# Patient Record
Sex: FEMALE | Race: BLACK OR AFRICAN AMERICAN | NOT HISPANIC OR LATINO | Employment: OTHER | URBAN - METROPOLITAN AREA
[De-identification: names, ages, dates, MRNs, and addresses within clinical notes are randomized per-mention and may not be internally consistent; named-entity substitution may affect disease eponyms.]

---

## 2024-07-21 ENCOUNTER — APPOINTMENT (OUTPATIENT)
Dept: RADIOLOGY | Facility: HOSPITAL | Age: 54
DRG: 208 | End: 2024-07-21
Payer: MEDICARE

## 2024-07-21 ENCOUNTER — HOSPITAL ENCOUNTER (INPATIENT)
Facility: HOSPITAL | Age: 54
DRG: 208 | End: 2024-07-21
Attending: EMERGENCY MEDICINE | Admitting: INTERNAL MEDICINE
Payer: MEDICARE

## 2024-07-21 ENCOUNTER — CLINICAL SUPPORT (OUTPATIENT)
Dept: EMERGENCY MEDICINE | Facility: HOSPITAL | Age: 54
DRG: 208 | End: 2024-07-21
Payer: MEDICARE

## 2024-07-21 DIAGNOSIS — I25.9 ISCHEMIC HEART DISEASE: ICD-10-CM

## 2024-07-21 DIAGNOSIS — J96.21 ACUTE ON CHRONIC RESPIRATORY FAILURE WITH HYPOXIA AND HYPERCAPNIA (MULTI): ICD-10-CM

## 2024-07-21 DIAGNOSIS — I49.01 CARDIAC ARREST WITH VENTRICULAR FIBRILLATION (MULTI): ICD-10-CM

## 2024-07-21 DIAGNOSIS — R41.82 ALTERED MENTAL STATUS, UNSPECIFIED ALTERED MENTAL STATUS TYPE: ICD-10-CM

## 2024-07-21 DIAGNOSIS — J96.22 ACUTE ON CHRONIC RESPIRATORY FAILURE WITH HYPERCAPNIA (MULTI): ICD-10-CM

## 2024-07-21 DIAGNOSIS — I82.4Y9 DEEP VEIN THROMBOSIS (DVT) OF PROXIMAL LOWER EXTREMITY, UNSPECIFIED CHRONICITY, UNSPECIFIED LATERALITY (MULTI): ICD-10-CM

## 2024-07-21 DIAGNOSIS — E11.65 TYPE 2 DIABETES MELLITUS WITH HYPERGLYCEMIA, UNSPECIFIED WHETHER LONG TERM INSULIN USE (MULTI): ICD-10-CM

## 2024-07-21 DIAGNOSIS — A41.9 SEPTIC SHOCK (MULTI): ICD-10-CM

## 2024-07-21 DIAGNOSIS — J96.22 ACUTE ON CHRONIC RESPIRATORY FAILURE WITH HYPOXIA AND HYPERCAPNIA (MULTI): ICD-10-CM

## 2024-07-21 DIAGNOSIS — E11.01: ICD-10-CM

## 2024-07-21 DIAGNOSIS — N17.9 AKI (ACUTE KIDNEY INJURY) (CMS-HCC): ICD-10-CM

## 2024-07-21 DIAGNOSIS — I46.9 CARDIAC ARREST WITH VENTRICULAR FIBRILLATION (MULTI): ICD-10-CM

## 2024-07-21 DIAGNOSIS — J18.9 PNEUMONIA OF LEFT LOWER LOBE DUE TO INFECTIOUS ORGANISM: ICD-10-CM

## 2024-07-21 DIAGNOSIS — R65.21 SEPTIC SHOCK (MULTI): ICD-10-CM

## 2024-07-21 DIAGNOSIS — R13.12 OROPHARYNGEAL DYSPHAGIA: ICD-10-CM

## 2024-07-21 DIAGNOSIS — R21 RASH AND NONSPECIFIC SKIN ERUPTION: Primary | ICD-10-CM

## 2024-07-21 DIAGNOSIS — R57.9 SHOCK, UNSPECIFIED (MULTI): ICD-10-CM

## 2024-07-21 DIAGNOSIS — K59.00 CONSTIPATION, UNSPECIFIED CONSTIPATION TYPE: ICD-10-CM

## 2024-07-21 LAB
ABO GROUP (TYPE) IN BLOOD: NORMAL
ALBUMIN SERPL BCP-MCNC: 3.5 G/DL (ref 3.4–5)
ALBUMIN SERPL BCP-MCNC: 3.5 G/DL (ref 3.4–5)
ALBUMIN SERPL BCP-MCNC: 3.9 G/DL (ref 3.4–5)
ALP SERPL-CCNC: 128 U/L (ref 33–110)
ALT SERPL W P-5'-P-CCNC: 9 U/L (ref 7–45)
AMPHETAMINES UR QL SCN: NORMAL
ANION GAP BLDV CALCULATED.4IONS-SCNC: 12 MMOL/L (ref 10–25)
ANION GAP BLDV CALCULATED.4IONS-SCNC: 13 MMOL/L (ref 10–25)
ANION GAP BLDV CALCULATED.4IONS-SCNC: 9 MMOL/L (ref 10–25)
ANION GAP SERPL CALC-SCNC: 17 MMOL/L (ref 10–20)
ANION GAP SERPL CALC-SCNC: 19 MMOL/L (ref 10–20)
ANION GAP SERPL CALC-SCNC: 21 MMOL/L (ref 10–20)
ANION GAP SERPL CALC-SCNC: 22 MMOL/L (ref 10–20)
ANTIBODY SCREEN: NORMAL
APPEARANCE UR: CLEAR
AST SERPL W P-5'-P-CCNC: 13 U/L (ref 9–39)
ATRIAL RATE: 95 BPM
B-OH-BUTYR SERPL-SCNC: 0.24 MMOL/L (ref 0.02–0.27)
BACTERIA #/AREA URNS AUTO: ABNORMAL /HPF
BARBITURATES UR QL SCN: NORMAL
BASE EXCESS BLDV CALC-SCNC: 10 MMOL/L (ref -2–3)
BASE EXCESS BLDV CALC-SCNC: 4.9 MMOL/L (ref -2–3)
BASE EXCESS BLDV CALC-SCNC: 8.7 MMOL/L (ref -2–3)
BASOPHILS # BLD AUTO: 0.03 X10*3/UL (ref 0–0.1)
BASOPHILS NFR BLD AUTO: 0.4 %
BENZODIAZ UR QL SCN: NORMAL
BILIRUB SERPL-MCNC: 0.7 MG/DL (ref 0–1.2)
BILIRUB UR STRIP.AUTO-MCNC: NEGATIVE MG/DL
BNP SERPL-MCNC: 6 PG/ML (ref 0–99)
BODY TEMPERATURE: 37 DEGREES CELSIUS
BUN SERPL-MCNC: 69 MG/DL (ref 6–23)
BUN SERPL-MCNC: 74 MG/DL (ref 6–23)
BUN SERPL-MCNC: 78 MG/DL (ref 6–23)
BUN SERPL-MCNC: 80 MG/DL (ref 6–23)
BZE UR QL SCN: NORMAL
CA-I BLDV-SCNC: 1.27 MMOL/L (ref 1.1–1.33)
CA-I BLDV-SCNC: 1.29 MMOL/L (ref 1.1–1.33)
CA-I BLDV-SCNC: 1.49 MMOL/L (ref 1.1–1.33)
CALCIUM SERPL-MCNC: 10.2 MG/DL (ref 8.6–10.6)
CALCIUM SERPL-MCNC: 10.7 MG/DL (ref 8.6–10.6)
CALCIUM SERPL-MCNC: 10.8 MG/DL (ref 8.6–10.6)
CALCIUM SERPL-MCNC: 10.8 MG/DL (ref 8.6–10.6)
CANNABINOIDS UR QL SCN: NORMAL
CARDIAC TROPONIN I PNL SERPL HS: 28 NG/L (ref 0–34)
CHLORIDE BLDV-SCNC: 105 MMOL/L (ref 98–107)
CHLORIDE BLDV-SCNC: 107 MMOL/L (ref 98–107)
CHLORIDE BLDV-SCNC: 110 MMOL/L (ref 98–107)
CHLORIDE SERPL-SCNC: 102 MMOL/L (ref 98–107)
CHLORIDE SERPL-SCNC: 103 MMOL/L (ref 98–107)
CHLORIDE SERPL-SCNC: 108 MMOL/L (ref 98–107)
CHLORIDE SERPL-SCNC: 112 MMOL/L (ref 98–107)
CHLORIDE UR-SCNC: 108 MMOL/L
CHLORIDE/CREATININE (MMOL/G) IN URINE: 452 MMOL/G CREAT (ref 38–318)
CO2 SERPL-SCNC: 29 MMOL/L (ref 21–32)
CO2 SERPL-SCNC: 29 MMOL/L (ref 21–32)
CO2 SERPL-SCNC: 35 MMOL/L (ref 21–32)
CO2 SERPL-SCNC: 35 MMOL/L (ref 21–32)
COLOR UR: ABNORMAL
CREAT SERPL-MCNC: 2.9 MG/DL (ref 0.5–1.05)
CREAT SERPL-MCNC: 3.24 MG/DL (ref 0.5–1.05)
CREAT SERPL-MCNC: 3.57 MG/DL (ref 0.5–1.05)
CREAT SERPL-MCNC: 3.57 MG/DL (ref 0.5–1.05)
CREAT UR-MCNC: 23.9 MG/DL (ref 20–320)
EGFRCR SERPLBLD CKD-EPI 2021: 14 ML/MIN/1.73M*2
EGFRCR SERPLBLD CKD-EPI 2021: 14 ML/MIN/1.73M*2
EGFRCR SERPLBLD CKD-EPI 2021: 16 ML/MIN/1.73M*2
EGFRCR SERPLBLD CKD-EPI 2021: 19 ML/MIN/1.73M*2
EOSINOPHIL # BLD AUTO: 0.05 X10*3/UL (ref 0–0.7)
EOSINOPHIL NFR BLD AUTO: 0.7 %
ERYTHROCYTE [DISTWIDTH] IN BLOOD BY AUTOMATED COUNT: 19.6 % (ref 11.5–14.5)
FENTANYL+NORFENTANYL UR QL SCN: NORMAL
GLUCOSE BLD MANUAL STRIP-MCNC: 241 MG/DL (ref 74–99)
GLUCOSE BLD MANUAL STRIP-MCNC: 357 MG/DL (ref 74–99)
GLUCOSE BLD MANUAL STRIP-MCNC: 366 MG/DL (ref 74–99)
GLUCOSE BLD MANUAL STRIP-MCNC: 485 MG/DL (ref 74–99)
GLUCOSE BLD MANUAL STRIP-MCNC: 524 MG/DL (ref 74–99)
GLUCOSE BLD MANUAL STRIP-MCNC: >600 MG/DL (ref 74–99)
GLUCOSE BLDV-MCNC: >685 MG/DL (ref 74–99)
GLUCOSE SERPL-MCNC: 1444 MG/DL (ref 74–99)
GLUCOSE SERPL-MCNC: 1498 MG/DL (ref 74–99)
GLUCOSE SERPL-MCNC: 362 MG/DL (ref 74–99)
GLUCOSE SERPL-MCNC: 732 MG/DL (ref 74–99)
GLUCOSE UR STRIP.AUTO-MCNC: ABNORMAL MG/DL
GRAN CASTS #/AREA UR COMP ASSIST: ABNORMAL /LPF
HCO3 BLDV-SCNC: 35.6 MMOL/L (ref 22–26)
HCO3 BLDV-SCNC: 37 MMOL/L (ref 22–26)
HCO3 BLDV-SCNC: 39.9 MMOL/L (ref 22–26)
HCT VFR BLD AUTO: 50.3 % (ref 36–46)
HCT VFR BLD EST: 38 % (ref 36–46)
HCT VFR BLD EST: 39 % (ref 36–46)
HCT VFR BLD EST: 42 % (ref 36–46)
HGB BLD-MCNC: 13.8 G/DL (ref 12–16)
HGB BLDV-MCNC: 12.5 G/DL (ref 12–16)
HGB BLDV-MCNC: 13 G/DL (ref 12–16)
HGB BLDV-MCNC: 13.9 G/DL (ref 12–16)
HYALINE CASTS #/AREA URNS AUTO: ABNORMAL /LPF
IMM GRANULOCYTES # BLD AUTO: 0.04 X10*3/UL (ref 0–0.7)
IMM GRANULOCYTES NFR BLD AUTO: 0.5 % (ref 0–0.9)
INHALED O2 CONCENTRATION: 21 %
INHALED O2 CONCENTRATION: 21 %
INR PPP: 1.7 (ref 0.9–1.1)
KETONES UR STRIP.AUTO-MCNC: NEGATIVE MG/DL
LACTATE BLDV-SCNC: 4.6 MMOL/L (ref 0.4–2)
LACTATE BLDV-SCNC: 5.2 MMOL/L (ref 0.4–2)
LACTATE SERPL-SCNC: 3.5 MMOL/L (ref 0.4–2)
LEUKOCYTE ESTERASE UR QL STRIP.AUTO: NEGATIVE
LYMPHOCYTES # BLD AUTO: 1.12 X10*3/UL (ref 1.2–4.8)
LYMPHOCYTES NFR BLD AUTO: 14.6 %
MAGNESIUM SERPL-MCNC: 2.21 MG/DL (ref 1.6–2.4)
MAGNESIUM SERPL-MCNC: 2.93 MG/DL (ref 1.6–2.4)
MCH RBC QN AUTO: 26.6 PG (ref 26–34)
MCHC RBC AUTO-ENTMCNC: 27.4 G/DL (ref 32–36)
MCV RBC AUTO: 97 FL (ref 80–100)
METHADONE UR QL SCN: NORMAL
MONOCYTES # BLD AUTO: 1.27 X10*3/UL (ref 0.1–1)
MONOCYTES NFR BLD AUTO: 16.6 %
MUCOUS THREADS #/AREA URNS AUTO: ABNORMAL /LPF
NEUTROPHILS # BLD AUTO: 5.14 X10*3/UL (ref 1.2–7.7)
NEUTROPHILS NFR BLD AUTO: 67.2 %
NITRITE UR QL STRIP.AUTO: NEGATIVE
NRBC BLD-RTO: 0 /100 WBCS (ref 0–0)
OPIATES UR QL SCN: NORMAL
OSMOLALITY SERPL: 416 MOSM/KG (ref 280–300)
OSMOLALITY UR: 455 MOSM/KG (ref 200–1200)
OXYCODONE+OXYMORPHONE UR QL SCN: NORMAL
OXYHGB MFR BLDV: 48.2 % (ref 45–75)
OXYHGB MFR BLDV: 54.6 % (ref 45–75)
OXYHGB MFR BLDV: 58.6 % (ref 45–75)
P AXIS: 55 DEGREES
P OFFSET: 194 MS
P ONSET: 139 MS
PCO2 BLDV: 67 MM HG (ref 41–51)
PCO2 BLDV: 83 MM HG (ref 41–51)
PCO2 BLDV: 89 MM HG (ref 41–51)
PCP UR QL SCN: NORMAL
PH BLDV: 7.21 PH (ref 7.33–7.43)
PH BLDV: 7.29 PH (ref 7.33–7.43)
PH BLDV: 7.35 PH (ref 7.33–7.43)
PH UR STRIP.AUTO: 5.5 [PH]
PHOSPHATE SERPL-MCNC: 1.2 MG/DL (ref 2.5–4.9)
PHOSPHATE SERPL-MCNC: 2.7 MG/DL (ref 2.5–4.9)
PLATELET # BLD AUTO: 165 X10*3/UL (ref 150–450)
PO2 BLDV: 35 MM HG (ref 35–45)
PO2 BLDV: 40 MM HG (ref 35–45)
PO2 BLDV: 40 MM HG (ref 35–45)
POTASSIUM BLDV-SCNC: 5.3 MMOL/L (ref 3.5–5.3)
POTASSIUM BLDV-SCNC: 5.6 MMOL/L (ref 3.5–5.3)
POTASSIUM BLDV-SCNC: 6.1 MMOL/L (ref 3.5–5.3)
POTASSIUM SERPL-SCNC: 3.4 MMOL/L (ref 3.5–5.3)
POTASSIUM SERPL-SCNC: 4.8 MMOL/L (ref 3.5–5.3)
POTASSIUM SERPL-SCNC: 5.6 MMOL/L (ref 3.5–5.3)
POTASSIUM SERPL-SCNC: 6.1 MMOL/L (ref 3.5–5.3)
POTASSIUM UR-SCNC: 37 MMOL/L
POTASSIUM/CREAT UR-RTO: 155 MMOL/G CREAT
PR INTERVAL: 156 MS
PROT SERPL-MCNC: 8 G/DL (ref 6.4–8.2)
PROT SERPL-MCNC: 8.7 G/DL (ref 6.4–8.2)
PROT UR STRIP.AUTO-MCNC: ABNORMAL MG/DL
PROT UR-ACNC: 31 MG/DL (ref 5–25)
PROTHROMBIN TIME: 19.2 SECONDS (ref 9.8–12.8)
Q ONSET: 217 MS
QRS COUNT: 16 BEATS
QRS DURATION: 96 MS
QT INTERVAL: 336 MS
QTC CALCULATION(BAZETT): 422 MS
QTC FREDERICIA: 391 MS
R AXIS: -10 DEGREES
RBC # BLD AUTO: 5.19 X10*6/UL (ref 4–5.2)
RBC # UR STRIP.AUTO: ABNORMAL /UL
RBC #/AREA URNS AUTO: ABNORMAL /HPF
RH FACTOR (ANTIGEN D): NORMAL
SAO2 % BLDV: 49 % (ref 45–75)
SAO2 % BLDV: 56 % (ref 45–75)
SAO2 % BLDV: 60 % (ref 45–75)
SARS-COV-2 RNA RESP QL NAA+PROBE: DETECTED
SODIUM BLDV-SCNC: 148 MMOL/L (ref 136–145)
SODIUM BLDV-SCNC: 150 MMOL/L (ref 136–145)
SODIUM BLDV-SCNC: 154 MMOL/L (ref 136–145)
SODIUM SERPL-SCNC: 149 MMOL/L (ref 136–145)
SODIUM SERPL-SCNC: 150 MMOL/L (ref 136–145)
SODIUM SERPL-SCNC: 154 MMOL/L (ref 136–145)
SODIUM SERPL-SCNC: 159 MMOL/L (ref 136–145)
SODIUM UR-SCNC: 91 MMOL/L
SODIUM/CREAT UR-RTO: 381 MMOL/G CREAT
SP GR UR STRIP.AUTO: 1.02
SQUAMOUS #/AREA URNS AUTO: ABNORMAL /HPF
T AXIS: 8 DEGREES
T OFFSET: 385 MS
TSH SERPL-ACNC: 3.43 MIU/L (ref 0.44–3.98)
UROBILINOGEN UR STRIP.AUTO-MCNC: NORMAL MG/DL
VENTRICULAR RATE: 95 BPM
WBC # BLD AUTO: 7.7 X10*3/UL (ref 4.4–11.3)
WBC #/AREA URNS AUTO: ABNORMAL /HPF

## 2024-07-21 PROCEDURE — 2500000005 HC RX 250 GENERAL PHARMACY W/O HCPCS: Mod: SE

## 2024-07-21 PROCEDURE — 96367 TX/PROPH/DG ADDL SEQ IV INF: CPT | Mod: 59

## 2024-07-21 PROCEDURE — 83036 HEMOGLOBIN GLYCOSYLATED A1C: CPT

## 2024-07-21 PROCEDURE — 86334 IMMUNOFIX E-PHORESIS SERUM: CPT

## 2024-07-21 PROCEDURE — 84166 PROTEIN E-PHORESIS/URINE/CSF: CPT

## 2024-07-21 PROCEDURE — 83880 ASSAY OF NATRIURETIC PEPTIDE: CPT | Performed by: NURSE PRACTITIONER

## 2024-07-21 PROCEDURE — 2500000004 HC RX 250 GENERAL PHARMACY W/ HCPCS (ALT 636 FOR OP/ED)

## 2024-07-21 PROCEDURE — 96366 THER/PROPH/DIAG IV INF ADDON: CPT | Mod: 59

## 2024-07-21 PROCEDURE — 31500 INSERT EMERGENCY AIRWAY: CPT | Performed by: NURSE PRACTITIONER

## 2024-07-21 PROCEDURE — 82010 KETONE BODYS QUAN: CPT

## 2024-07-21 PROCEDURE — 3E033XZ INTRODUCTION OF VASOPRESSOR INTO PERIPHERAL VEIN, PERCUTANEOUS APPROACH: ICD-10-PCS | Performed by: NURSE PRACTITIONER

## 2024-07-21 PROCEDURE — 99291 CRITICAL CARE FIRST HOUR: CPT

## 2024-07-21 PROCEDURE — 93010 ELECTROCARDIOGRAM REPORT: CPT | Performed by: NURSE PRACTITIONER

## 2024-07-21 PROCEDURE — 92950 HEART/LUNG RESUSCITATION CPR: CPT | Performed by: EMERGENCY MEDICINE

## 2024-07-21 PROCEDURE — 82947 ASSAY GLUCOSE BLOOD QUANT: CPT

## 2024-07-21 PROCEDURE — 84132 ASSAY OF SERUM POTASSIUM: CPT

## 2024-07-21 PROCEDURE — 2500000001 HC RX 250 WO HCPCS SELF ADMINISTERED DRUGS (ALT 637 FOR MEDICARE OP)

## 2024-07-21 PROCEDURE — 70450 CT HEAD/BRAIN W/O DYE: CPT

## 2024-07-21 PROCEDURE — 83605 ASSAY OF LACTIC ACID: CPT | Performed by: NURSE PRACTITIONER

## 2024-07-21 PROCEDURE — 84075 ASSAY ALKALINE PHOSPHATASE: CPT | Performed by: NURSE PRACTITIONER

## 2024-07-21 PROCEDURE — 99292 CRITICAL CARE ADDL 30 MIN: CPT | Performed by: NURSE PRACTITIONER

## 2024-07-21 PROCEDURE — 71045 X-RAY EXAM CHEST 1 VIEW: CPT | Performed by: RADIOLOGY

## 2024-07-21 PROCEDURE — 36415 COLL VENOUS BLD VENIPUNCTURE: CPT | Performed by: NURSE PRACTITIONER

## 2024-07-21 PROCEDURE — 84155 ASSAY OF PROTEIN SERUM: CPT

## 2024-07-21 PROCEDURE — 83935 ASSAY OF URINE OSMOLALITY: CPT

## 2024-07-21 PROCEDURE — 84156 ASSAY OF PROTEIN URINE: CPT

## 2024-07-21 PROCEDURE — 80307 DRUG TEST PRSMV CHEM ANLYZR: CPT

## 2024-07-21 PROCEDURE — 85025 COMPLETE CBC W/AUTO DIFF WBC: CPT | Performed by: NURSE PRACTITIONER

## 2024-07-21 PROCEDURE — 99285 EMERGENCY DEPT VISIT HI MDM: CPT | Mod: 25

## 2024-07-21 PROCEDURE — 5A1945Z RESPIRATORY VENTILATION, 24-96 CONSECUTIVE HOURS: ICD-10-PCS | Performed by: NURSE PRACTITIONER

## 2024-07-21 PROCEDURE — 84484 ASSAY OF TROPONIN QUANT: CPT | Performed by: NURSE PRACTITIONER

## 2024-07-21 PROCEDURE — 86325 OTHER IMMUNOELECTROPHORESIS: CPT

## 2024-07-21 PROCEDURE — 0BH17EZ INSERTION OF ENDOTRACHEAL AIRWAY INTO TRACHEA, VIA NATURAL OR ARTIFICIAL OPENING: ICD-10-PCS | Performed by: NURSE PRACTITIONER

## 2024-07-21 PROCEDURE — 85610 PROTHROMBIN TIME: CPT | Performed by: NURSE PRACTITIONER

## 2024-07-21 PROCEDURE — 83930 ASSAY OF BLOOD OSMOLALITY: CPT

## 2024-07-21 PROCEDURE — 93005 ELECTROCARDIOGRAM TRACING: CPT

## 2024-07-21 PROCEDURE — 2500000002 HC RX 250 W HCPCS SELF ADMINISTERED DRUGS (ALT 637 FOR MEDICARE OP, ALT 636 FOR OP/ED): Mod: SE | Performed by: NURSE PRACTITIONER

## 2024-07-21 PROCEDURE — 2500000005 HC RX 250 GENERAL PHARMACY W/O HCPCS

## 2024-07-21 PROCEDURE — 2020000001 HC ICU ROOM DAILY

## 2024-07-21 PROCEDURE — 71045 X-RAY EXAM CHEST 1 VIEW: CPT

## 2024-07-21 PROCEDURE — 71250 CT THORAX DX C-: CPT | Performed by: RADIOLOGY

## 2024-07-21 PROCEDURE — 86320 SERUM IMMUNOELECTROPHORESIS: CPT

## 2024-07-21 PROCEDURE — 84165 PROTEIN E-PHORESIS SERUM: CPT

## 2024-07-21 PROCEDURE — 82436 ASSAY OF URINE CHLORIDE: CPT

## 2024-07-21 PROCEDURE — 74176 CT ABD & PELVIS W/O CONTRAST: CPT

## 2024-07-21 PROCEDURE — 83735 ASSAY OF MAGNESIUM: CPT | Performed by: NURSE PRACTITIONER

## 2024-07-21 PROCEDURE — 94002 VENT MGMT INPAT INIT DAY: CPT | Mod: CCI

## 2024-07-21 PROCEDURE — 74018 RADEX ABDOMEN 1 VIEW: CPT

## 2024-07-21 PROCEDURE — 74176 CT ABD & PELVIS W/O CONTRAST: CPT | Performed by: RADIOLOGY

## 2024-07-21 PROCEDURE — 82565 ASSAY OF CREATININE: CPT | Performed by: NURSE PRACTITIONER

## 2024-07-21 PROCEDURE — 70450 CT HEAD/BRAIN W/O DYE: CPT | Performed by: RADIOLOGY

## 2024-07-21 PROCEDURE — 96365 THER/PROPH/DIAG IV INF INIT: CPT | Mod: 59

## 2024-07-21 PROCEDURE — 84443 ASSAY THYROID STIM HORMONE: CPT | Performed by: NURSE PRACTITIONER

## 2024-07-21 PROCEDURE — 86901 BLOOD TYPING SEROLOGIC RH(D): CPT

## 2024-07-21 PROCEDURE — 2500000004 HC RX 250 GENERAL PHARMACY W/ HCPCS (ALT 636 FOR OP/ED): Mod: SE | Performed by: NURSE PRACTITIONER

## 2024-07-21 PROCEDURE — 81001 URINALYSIS AUTO W/SCOPE: CPT | Performed by: NURSE PRACTITIONER

## 2024-07-21 PROCEDURE — 87040 BLOOD CULTURE FOR BACTERIA: CPT | Performed by: NURSE PRACTITIONER

## 2024-07-21 PROCEDURE — 84132 ASSAY OF SERUM POTASSIUM: CPT | Performed by: NURSE PRACTITIONER

## 2024-07-21 PROCEDURE — 2500000005 HC RX 250 GENERAL PHARMACY W/O HCPCS: Performed by: NURSE PRACTITIONER

## 2024-07-21 PROCEDURE — 99291 CRITICAL CARE FIRST HOUR: CPT | Performed by: NURSE PRACTITIONER

## 2024-07-21 PROCEDURE — 96375 TX/PRO/DX INJ NEW DRUG ADDON: CPT | Mod: 59

## 2024-07-21 PROCEDURE — 96361 HYDRATE IV INFUSION ADD-ON: CPT | Mod: 59

## 2024-07-21 PROCEDURE — 87635 SARS-COV-2 COVID-19 AMP PRB: CPT

## 2024-07-21 PROCEDURE — 83735 ASSAY OF MAGNESIUM: CPT

## 2024-07-21 PROCEDURE — 94640 AIRWAY INHALATION TREATMENT: CPT

## 2024-07-21 RX ORDER — IPRATROPIUM BROMIDE AND ALBUTEROL SULFATE 2.5; .5 MG/3ML; MG/3ML
3 SOLUTION RESPIRATORY (INHALATION) EVERY 20 MIN
Status: COMPLETED | OUTPATIENT
Start: 2024-07-21 | End: 2024-07-21

## 2024-07-21 RX ORDER — ROCURONIUM BROMIDE 10 MG/ML
100 INJECTION, SOLUTION INTRAVENOUS ONCE
Status: DISCONTINUED | OUTPATIENT
Start: 2024-07-21 | End: 2024-07-21

## 2024-07-21 RX ORDER — ETOMIDATE 2 MG/ML
INJECTION INTRAVENOUS
Status: COMPLETED
Start: 2024-07-21 | End: 2024-07-21

## 2024-07-21 RX ORDER — MEROPENEM 1 G/1
1 INJECTION, POWDER, FOR SOLUTION INTRAVENOUS EVERY 12 HOURS
Status: DISCONTINUED | OUTPATIENT
Start: 2024-07-21 | End: 2024-07-21

## 2024-07-21 RX ORDER — VANCOMYCIN HYDROCHLORIDE 1 G/20ML
INJECTION, POWDER, LYOPHILIZED, FOR SOLUTION INTRAVENOUS DAILY PRN
Status: DISCONTINUED | OUTPATIENT
Start: 2024-07-21 | End: 2024-07-24

## 2024-07-21 RX ORDER — DEXTROSE MONOHYDRATE AND SODIUM CHLORIDE 5; .9 G/100ML; G/100ML
150 INJECTION, SOLUTION INTRAVENOUS CONTINUOUS
Status: DISCONTINUED | OUTPATIENT
Start: 2024-07-21 | End: 2024-07-21

## 2024-07-21 RX ORDER — BISACODYL 10 MG/1
10 SUPPOSITORY RECTAL ONCE
Status: COMPLETED | OUTPATIENT
Start: 2024-07-21 | End: 2024-07-21

## 2024-07-21 RX ORDER — DEXTROSE MONOHYDRATE 100 MG/ML
150 INJECTION, SOLUTION INTRAVENOUS CONTINUOUS
Status: DISCONTINUED | OUTPATIENT
Start: 2024-07-21 | End: 2024-07-22

## 2024-07-21 RX ORDER — PROPOFOL 10 MG/ML
INJECTION, EMULSION INTRAVENOUS
Status: COMPLETED
Start: 2024-07-21 | End: 2024-07-21

## 2024-07-21 RX ORDER — PROPOFOL 10 MG/ML
5-50 INJECTION, EMULSION INTRAVENOUS CONTINUOUS
Status: DISCONTINUED | OUTPATIENT
Start: 2024-07-21 | End: 2024-07-23

## 2024-07-21 RX ORDER — VANCOMYCIN HYDROCHLORIDE 1 G/200ML
2 INJECTION, SOLUTION INTRAVENOUS ONCE
Status: COMPLETED | OUTPATIENT
Start: 2024-07-21 | End: 2024-07-21

## 2024-07-21 RX ORDER — METRONIDAZOLE 500 MG/100ML
500 INJECTION, SOLUTION INTRAVENOUS EVERY 8 HOURS
Status: DISCONTINUED | OUTPATIENT
Start: 2024-07-21 | End: 2024-07-24

## 2024-07-21 RX ORDER — HEPARIN SODIUM 10000 [USP'U]/100ML
0-4000 INJECTION, SOLUTION INTRAVENOUS CONTINUOUS
Status: DISCONTINUED | OUTPATIENT
Start: 2024-07-21 | End: 2024-07-22

## 2024-07-21 RX ORDER — DEXTROMETHORPHAN POLISTIREX 30 MG/5 ML
1 SUSPENSION, EXTENDED RELEASE 12 HR ORAL ONCE
Status: DISCONTINUED | OUTPATIENT
Start: 2024-07-21 | End: 2024-07-21

## 2024-07-21 RX ORDER — NOREPINEPHRINE BITARTRATE/D5W 8 MG/250ML
0-.2 PLASTIC BAG, INJECTION (ML) INTRAVENOUS CONTINUOUS
Status: DISCONTINUED | OUTPATIENT
Start: 2024-07-21 | End: 2024-07-21

## 2024-07-21 RX ORDER — CALCIUM GLUCONATE 20 MG/ML
2 INJECTION, SOLUTION INTRAVENOUS ONCE
Status: COMPLETED | OUTPATIENT
Start: 2024-07-21 | End: 2024-07-21

## 2024-07-21 RX ORDER — CEFEPIME 1 G/50ML
2 INJECTION, SOLUTION INTRAVENOUS ONCE
Status: COMPLETED | OUTPATIENT
Start: 2024-07-21 | End: 2024-07-21

## 2024-07-21 RX ORDER — NOREPINEPHRINE BITARTRATE/D5W 8 MG/250ML
PLASTIC BAG, INJECTION (ML) INTRAVENOUS
Status: COMPLETED
Start: 2024-07-21 | End: 2024-07-21

## 2024-07-21 RX ORDER — ROCURONIUM BROMIDE 10 MG/ML
INJECTION, SOLUTION INTRAVENOUS
Status: COMPLETED
Start: 2024-07-21 | End: 2024-07-21

## 2024-07-21 RX ORDER — HEPARIN SODIUM 5000 [USP'U]/ML
5000 INJECTION, SOLUTION INTRAVENOUS; SUBCUTANEOUS EVERY 8 HOURS
Status: DISCONTINUED | OUTPATIENT
Start: 2024-07-21 | End: 2024-07-21

## 2024-07-21 RX ORDER — ETOMIDATE 2 MG/ML
20 INJECTION INTRAVENOUS ONCE
Status: DISCONTINUED | OUTPATIENT
Start: 2024-07-21 | End: 2024-07-21

## 2024-07-21 RX ORDER — METRONIDAZOLE 500 MG/100ML
500 INJECTION, SOLUTION INTRAVENOUS ONCE
Status: DISCONTINUED | OUTPATIENT
Start: 2024-07-21 | End: 2024-07-21

## 2024-07-21 RX ORDER — DEXTROSE 50 % IN WATER (D50W) INTRAVENOUS SYRINGE
50 AS NEEDED
Status: DISCONTINUED | OUTPATIENT
Start: 2024-07-21 | End: 2024-08-06 | Stop reason: HOSPADM

## 2024-07-21 SDOH — SOCIAL STABILITY: SOCIAL INSECURITY: HAS ANYONE EVER THREATENED TO HURT YOUR FAMILY OR YOUR PETS?: UNABLE TO ASSESS

## 2024-07-21 SDOH — SOCIAL STABILITY: SOCIAL INSECURITY: ABUSE: ADULT

## 2024-07-21 SDOH — SOCIAL STABILITY: SOCIAL INSECURITY: ARE THERE ANY APPARENT SIGNS OF INJURIES/BEHAVIORS THAT COULD BE RELATED TO ABUSE/NEGLECT?: UNABLE TO ASSESS

## 2024-07-21 SDOH — SOCIAL STABILITY: SOCIAL INSECURITY: DO YOU FEEL UNSAFE GOING BACK TO THE PLACE WHERE YOU ARE LIVING?: UNABLE TO ASSESS

## 2024-07-21 SDOH — SOCIAL STABILITY: SOCIAL INSECURITY: HAVE YOU HAD THOUGHTS OF HARMING ANYONE ELSE?: UNABLE TO ASSESS

## 2024-07-21 SDOH — SOCIAL STABILITY: SOCIAL INSECURITY: DOES ANYONE TRY TO KEEP YOU FROM HAVING/CONTACTING OTHER FRIENDS OR DOING THINGS OUTSIDE YOUR HOME?: UNABLE TO ASSESS

## 2024-07-21 SDOH — SOCIAL STABILITY: SOCIAL INSECURITY: WERE YOU ABLE TO COMPLETE ALL THE BEHAVIORAL HEALTH SCREENINGS?: NO

## 2024-07-21 SDOH — SOCIAL STABILITY: SOCIAL INSECURITY: ARE YOU OR HAVE YOU BEEN THREATENED OR ABUSED PHYSICALLY, EMOTIONALLY, OR SEXUALLY BY ANYONE?: UNABLE TO ASSESS

## 2024-07-21 SDOH — SOCIAL STABILITY: SOCIAL INSECURITY: HAVE YOU HAD ANY THOUGHTS OF HARMING ANYONE ELSE?: UNABLE TO ASSESS

## 2024-07-21 SDOH — SOCIAL STABILITY: SOCIAL INSECURITY: DO YOU FEEL ANYONE HAS EXPLOITED OR TAKEN ADVANTAGE OF YOU FINANCIALLY OR OF YOUR PERSONAL PROPERTY?: UNABLE TO ASSESS

## 2024-07-21 ASSESSMENT — COGNITIVE AND FUNCTIONAL STATUS - GENERAL: PATIENT BASELINE BEDBOUND: UNABLE TO ASSESS AT THIS TIME

## 2024-07-21 ASSESSMENT — ACTIVITIES OF DAILY LIVING (ADL)
DRESSING YOURSELF: UNABLE TO ASSESS
PATIENT'S MEMORY ADEQUATE TO SAFELY COMPLETE DAILY ACTIVITIES?: UNABLE TO ASSESS
ADEQUATE_TO_COMPLETE_ADL: UNABLE TO ASSESS
WALKS IN HOME: UNABLE TO ASSESS
BATHING: UNABLE TO ASSESS
LACK_OF_TRANSPORTATION: PATIENT UNABLE TO ANSWER
HEARING - RIGHT EAR: UNABLE TO ASSESS
HEARING - LEFT EAR: UNABLE TO ASSESS
JUDGMENT_ADEQUATE_SAFELY_COMPLETE_DAILY_ACTIVITIES: UNABLE TO ASSESS
TOILETING: UNABLE TO ASSESS
GROOMING: UNABLE TO ASSESS
FEEDING YOURSELF: UNABLE TO ASSESS

## 2024-07-21 ASSESSMENT — PATIENT HEALTH QUESTIONNAIRE - PHQ9
SUM OF ALL RESPONSES TO PHQ9 QUESTIONS 1 & 2: 0
1. LITTLE INTEREST OR PLEASURE IN DOING THINGS: NOT AT ALL
2. FEELING DOWN, DEPRESSED OR HOPELESS: NOT AT ALL

## 2024-07-21 ASSESSMENT — PAIN - FUNCTIONAL ASSESSMENT: PAIN_FUNCTIONAL_ASSESSMENT: 0-10

## 2024-07-21 ASSESSMENT — LIFESTYLE VARIABLES
HOW OFTEN DO YOU HAVE 6 OR MORE DRINKS ON ONE OCCASION: PATIENT UNABLE TO ANSWER
HOW MANY STANDARD DRINKS CONTAINING ALCOHOL DO YOU HAVE ON A TYPICAL DAY: PATIENT UNABLE TO ANSWER
AUDIT-C TOTAL SCORE: -1
AUDIT-C TOTAL SCORE: -1
HOW OFTEN DO YOU HAVE A DRINK CONTAINING ALCOHOL: PATIENT UNABLE TO ANSWER
SKIP TO QUESTIONS 9-10: 0

## 2024-07-21 ASSESSMENT — COLUMBIA-SUICIDE SEVERITY RATING SCALE - C-SSRS
1. IN THE PAST MONTH, HAVE YOU WISHED YOU WERE DEAD OR WISHED YOU COULD GO TO SLEEP AND NOT WAKE UP?: NO
2. HAVE YOU ACTUALLY HAD ANY THOUGHTS OF KILLING YOURSELF?: NO
6. HAVE YOU EVER DONE ANYTHING, STARTED TO DO ANYTHING, OR PREPARED TO DO ANYTHING TO END YOUR LIFE?: NO

## 2024-07-21 ASSESSMENT — PAIN SCALES - GENERAL: PAINLEVEL_OUTOF10: 0 - NO PAIN

## 2024-07-21 NOTE — HOSPITAL COURSE
Ms. Tucker is a 54 y/o F w/ a PMH of CVA c/b left hemiparesis and dysphagia (2016) s/p PEG placement (2016, replaced 5 times, removed unknown date), HTN, PE/DVT on Eliquis who was sent to the ED by [facility] for evaluation of mental status. Nursing facility says patient is normally awake, alert, and able to interact with staff. This morning they found the patient hypoxic in the 80s on room air, tachycardic, hypotensive, hyperglycemic to the 400s, and unable to answer questions with drool pooling in her mouth, prompting them to call EMS.     ED labs were notable for ZULMA (BUN 80, Cr 3.57, hyperkalemia (K 6.1), hypernatremic (Na 150, corrected 171), and hyperglycemia (Glucose >1400), with concern for proctitis and large stool burden on imaging. In the ED she received 1L LR, 1 dose of Cefepime, Vancomycin, and Metronidazole, was started on an Insulin Drip, was started on Norepinephrine drip, given 2g of calcium gluconate, and intubated. She has no charted history of diabetes and no A1C in the chart. Patient was admitted to ICU with acute hypercapnic respiratory failure requiring intubation as well as encephalopathy, likely secondary to hyperglycemia and hypernatremia.    In the ICU on 7/22, patient had V.fib cardiac arrest. Required 3 shocks, epi x 3, amiodarone 300mg, calcium x 1 , HCO3 x 1 during the code. Patient achieved ROSC and was following commands. She was slowly weaned off sedation and was able to be extubated 7/23/2024. Norepinephrine was weaned and discontinued 7/24/2024. Insulin drip was transitioned to oral insulin regimen. Patient continued to have a heavy stool burden with no bowel movement concerning for a fecal impaction. 7/24- Had manual disimpaction with multiple bowel movement after.   Developed a fever on 7/25, necessitating infection workup and restarting antibiotics. She continued to do well and remained stable. Pf4 antibodies ordered due to concerns for HIT. Trending  platelets.    PF4 came out negative and platelets are improved. 7/28- [id number]-161. Midodrine weaned to 5mg tid. Abx discontinued given negative culture since 7/25. LHC tomorrow,7/29, keeping NPO midnight.

## 2024-07-21 NOTE — CARE PLAN
53 year old female with multiple comorbidities in nursing home admitted to MICU with HHS  Glucose 1400  Consult regarding Hypernatremia    Recommendations:  - Consult endocrinology  - N/S is acceptable  - Manage HHS as per ICU protocol  - Check Labs at least Q4-6 hours.  - Nephrology follow up   Called and spoke to Constanza(significant other) informed to skip coumadin 9- and resume same dose on 9- and recheck INR in 2 weeks  Mayelin Stringer

## 2024-07-21 NOTE — H&P
Medical Intensive Care - History and Physical   Subjective    Caryl Tucker is a 53 y.o. year old female patient admitted on 7/21/2024 with following ICU needs: acute hypercapnic respiratory failure requiring intubation as well as encephalopathy, likely secondary to hyperglycemia and hypernatremia.    HPI:  HPI: Ms. Tucker is a 54 y/o F w/ a PMH of CVA c/b left hemiparesis and dysphagia (2016) s/p PEG placement (2016, replaced 5 times, removed unknown date), HTN, PE/DVT on Eliquis who was sent to the ED by [facility] for evaluation of mental status. Nursing facility says patient is normally awake, alert, and able to interact with staff. This morning they found the patient hypoxic in the 80s on room air, tachycardic, hypotensive, hyperglycemic to the 400s, and unable to answer questions with drool pooling in her mouth, prompting them to call EMS.     ED labs were notable for ZULMA (BUN 80, Cr 3.57, hyperkalemia (K 6.1), hypernatremic (Na 150, corrected 171), and hyperglycemia (Glucose >1400), with concern for proctitis and large stool burden on imaging. In the ED she received 1L LR, 1 dose of Cefepime, Vancomycin, and Metronidazole, was started on an Insulin Drip, was started on Norepinephrine drip, given 2g of calcium gluconate, and intubated. She has no charted history of diabetes and no A1C in the chart. Patient was admitted to ICU with acute hypercapnic respiratory failure requiring intubation as well as encephalopathy, likely secondary to hyperglycemia and hypernatremia.    Per conversation with nursing home, patient's home medications are:  -S/p 7 day course Levofloxacin (completed)   -Eliquis 5mg BID   -Ferrous sulfate 325 mg   -HCTZ 12.5mg   -Lisinopril 20mg   -Senna   -Tylenol 325mg     Past Medical History:   Diagnosis Date    Anemia 2016    Chronic pulmonary edema (HHS-HCC) 2016    Dysarthria following cerebral infarction 2016    Dysphagia following cerebral infarction 2016    Dyspnea 2016     Encephalopathy 2016    Facial weakness following cerebral infarction 2016    Hemiplegia and hemiparesis following cerebral infarction affecting left non-dominant side (Multi) 2016    Hypertension 2016    Personal history of DVT (deep vein thrombosis) 2016    Personal history of pulmonary embolism 2016     Past Surgical History:   Procedure Laterality Date    GASTROSTOMY  08/31/2016    Originally placed 2016. Replaced 05/15/2018, 07/31/2018, 02/25/2021, 04/2021, 07/2021, 06/2022    MR HEAD ANGIO WO IV CONTRAST  08/19/2016    MR HEAD ANGIO WO IV CONTRAST 8/19/2016 [facility]ICAL [facility]    MR NECK ANGIO WO IV CONTRAST  08/19/2016    MR NECK ANGIO WO IV CONTRAST 8/19/2016 [facility] CLINICAL [facility]GAC[facility]    PEG TUBE REMOVAL      Unknown Date     No family history on file.  Social History     Tobacco Use    Smoking status: Never    Tobacco comments:     Reported by patient in 2016.   Substance Use Topics    Alcohol use: Never     Comment: Reported by patient in 2016.    Drug use: Never     Comment: Reported by patient in 2016.       Review of Systems:  Review of Systems   Unable to perform ROS: Intubated      Meds    Home medications:  No current outpatient medications     Inpatient medications:  Scheduled medications  aztreonam (Azactam) 1 g in dextrose 5% 100 mL IV, 1 g, intravenous, Once  [START ON 7/22/2024] aztreonam (Azactam) 500 mg in dextrose 5% 50 mL IV, 500 mg, intravenous, q8h  bisacodyl, 10 mg, rectal, Once  heparin (porcine), 5,000 Units, subcutaneous, q8h  metroNIDAZOLE, 500 mg, intravenous, q8h  oxygen, , inhalation, Continuous - Inhalation  sodium chloride, 1,000 mL, intravenous, Once    Continuous medications  dextrose 10 % in water (D10W), 150 mL/hr  dextrose 10 % in water (D10W), 150 mL/hr  dextrose 10 % in water (D10W), 150 mL/hr  insulin regular, 0-50 Units/hr, Last Rate: 16.25 Units/hr (07/21/24 1750)  propofol, 5-50 mcg/kg/min, Last Rate: 20 mcg/kg/min (07/21/24 1605)    PRN medications  PRN medications:  "dextrose, insulin regular, propofol, vancomycin     Objective    VITALS  Visit Vitals  BP (!) 166/95   Pulse 62   Temp 36.3 °C (97.3 °F) (Oral)   Resp 16   Ht 1.803 m (5' 11\")   Wt 101 kg (221 lb 9 oz)   SpO2 100%   BMI 30.90 kg/m²   Smoking Status Never   BSA 2.25 m²      Physical Exam  Vitals and nursing note reviewed.   Constitutional:       Appearance: She is overweight.      Interventions: She is sedated and intubated.   HENT:      Head: Normocephalic and atraumatic.      Nose: Nose normal.      Mouth/Throat:      Mouth: Mucous membranes are dry.   Cardiovascular:      Rate and Rhythm: Normal rate and regular rhythm.      Pulses: Normal pulses.      Heart sounds: Heart sounds are distant.   Pulmonary:      Effort: She is intubated.      Breath sounds: No stridor.   Abdominal:      General: A surgical scar is present.      Palpations: Abdomen is soft.      Tenderness: There is no abdominal tenderness.   Musculoskeletal:      Right lower le+ Pitting Edema present.      Left lower le+ Pitting Edema present.   Skin:     General: Skin is warm and dry.      Capillary Refill: Capillary refill takes less than 2 seconds.      Comments: Bilateral lower extremity skin dry and flaking with dark discoloration    Neurological:      Comments: Intubated and sedated         Intake/Output Summary (Last 24 hours) at 2024 1839  Last data filed at 2024 1819  Gross per 24 hour   Intake 100 ml   Output --   Net 100 ml     General Chemistry Labs  Results from last 72 hours   Lab Units 24  1343 24  1121   GLUCOSE mg/dL 1,444* 1,498*   SODIUM mmol/L 149* 150*   POTASSIUM mmol/L 5.6* 6.1*   CHLORIDE mmol/L 103 102   CO2 mmol/L 35* 35*   BUN mg/dL 78* 80*   CREATININE mg/dL 3.57* 3.57*   ANION GAP mmol/L 17 19   EGFR mL/min/1.73m*2 14* 14*   CALCIUM mg/dL 10.2 10.7*   MAGNESIUM mg/dL  --  2.93*   ALBUMIN g/dL  --  3.9   ALK PHOS U/L  --  128*   ALT U/L  --  9   AST U/L  --  13   BILIRUBIN TOTAL mg/dL  --  0.7 "   PROTEIN TOTAL g/dL  --  8.7*      CBC  Results from last 72 hours   Lab Units 07/21/24  1121   WBC AUTO x10*3/uL 7.7   HEMOGLOBIN g/dL 13.8   HEMATOCRIT % 50.3*   MCV fL 97   MCH pg 26.6   MCHC g/dL 27.4*   RDW % 19.6*   PLATELETS AUTO x10*3/uL 165   NEUTROS PCT AUTO % 67.2   LYMPHS PCT AUTO % 14.6   MONOS PCT AUTO % 16.6   EOS PCT AUTO % 0.7     Coagulation Labs  Results from last 72 hours   Lab Units 07/21/24  1121   INR  1.7*   PROTIME seconds 19.2*     Cardiac Labs  Results from last 72 hours   Lab Units 07/21/24  1121   TROPHSCMC ng/L 28   BNP pg/mL 6     MELD 3.0: 24 at 7/21/2024  1:43 PM  MELD-Na: 25 at 7/21/2024  1:43 PM  Calculated from:  Serum Creatinine: 3.57 mg/dL (Using max of 3 mg/dL) at 7/21/2024  1:43 PM  Serum Sodium: 149 mmol/L (Using max of 137 mmol/L) at 7/21/2024  1:43 PM  Total Bilirubin: 0.7 mg/dL (Using min of 1 mg/dL) at 7/21/2024 11:21 AM  Serum Albumin: 3.9 g/dL (Using max of 3.5 g/dL) at 7/21/2024 11:21 AM  INR(ratio): 1.7 at 7/21/2024 11:21 AM  Age at listing (hypothetical): 53 years  Sex: Female at 7/21/2024  1:43 PM    Micro/ID:   Lab Results   Component Value Date    BLOODCULT Loaded on Instrument - Culture in progress 07/21/2024    BLOODCULT Loaded on Instrument - Culture in progress 07/21/2024     Summary of key imaging results from the last 24 hours  XR CHEST 1 VIEW;  7/21/2024 1:19pm  1.  Left basilar dense airspace disease concerning for pneumonia versus atelectasis  2. Low lung volumes    CT HEAD WO IV CONTRAST; 7/21/2024 2:02 pm   Minimal cortical volume loss without hydrocephalus, hemorrhage or extra-axial collection.    CT CHEST ABDOMEN PELVIS WO CONTRAST; 7/21/2024 2:02 pm  Chest  1. No acute process in the chest.  2. Dilated main pulmonary artery. Correlate with pulmonary arterial hypertension.  Abdomen-Pelvis  1. Markedly distended rectum with mild thickening of the lower rectum and anorectal junction with a large stool ball and mesorectal fat stranding. Findings likely  represent fecal impaction with stercoral proctitis.  2. Otherwise no acute process in the abdomen and pelvis.  Assessment and Plan   Assessment:  Caryl Tucker is a 53 y.o. year old female patient w/ a PMH significant for PMH of CVA c/b left hemiparesis and dysphagia (2016) s/p PEG placement (2016, replaced 5 times, removed unknown date), HTN, PE/DVT on Eliquis, who was admitted to the MICU on 07/21/24 for acute hypercapnic respiratory failure requiring intubation, encephalopathy, hyperglycemia, and hypernatremia. Currently on ventilator and sedated, being managed with insulin and cautious fluids for electrolyte abnormalities.    Mechanical Ventilation: < 4 days  Sedation/Analgesia:  Propofol  Restraints: Restraints indicated as alternative therapies have been attempted and have been ineffective.  Restrain with soft wrist restraints and side rails up x4 until medical devices discontinued and/or patient able to participate with plan of care.     Plan:  NEUROLOGY/PSYCH:  #Encephalopathy, likely metabolic, secondary to hypernatremia vs hyperglycemia  #H/o CVA causing hemiplegia, hemiparesis, dysphagia, and dysarthria 2016  -Baseline AAO*3, on presentation altered  -Na 150, corrected 171, on presentation  -Glucose >1400 on presentation  -7/21 CT head: minimal cortical volume loss without hydrocephalus, hemorrhage, or extra-axial collection  Plan:  -Will monitor  -Treating metabolic derangements as per below, see Renal    CARDIOVASCULAR:  #Concern for shock on admission, less likely at present  -Patient presented with a BP of 91/ 62  -Given 8mg norepinephrine in ED  -Repeat /95  -Not on pressors at time of arrival to ICU. Pressures stable  Plan  -Will monitor for hypotension and symptoms of hypoperfusion  #H/o DVT and PE, 2016  #Chronic Eliquis Usage  -On admission 7/21: Troponin 28, BNP 6  Plan  -Holding Eliquis inpatient  -Heparin subcutaneous. Pending decision on if to do Heparin drip given  history  #Hypertension- POA  -BP 91/62, then repeated 166/95 on admission  -Reported home medication:HCTZ 12.5mg, Lisinopril 20mg   Plan:  -Holding home medications in setting of ZULMA    PULMONARY:  #Acute Hypoxic Hypercapnic Respiratory Failure, requiring intubation  #Primary Respiratory Acidosis with Secondary Metabolic Alkalosis  -On admission 7/21:  Bicarbonate 35  -7/21: Vent Mode: Volume control/assist control  FiO2 (%):  [50 %] 50 %  S RR:  [18] 18  S VT:  [450 mL] 450 mL  PEEP/CPAP (cm H2O):  [5 cm H20] 5 cm H20  MAP (cm H2O):  [9-10] 9   Plan:  -Continuing ventilator and sedation  -Q2H VBG    RENAL/GENITOURINARY:  #ZULMA  -2021: BUN 20s, Cr 0.85  -On admission: BUN 80, Cr 3.57, AG 19, Egfr 14  Plan:  -Nephrology consulted  -Avoiding nephrotoxic medications  -Pending nephrology recommendations regarding fluid management    GASTROENTEROLOGY:  #H/o Gastrostomy Tube  #Fecal Impaction on Imaging  -No PEG tube in place, abdominal scar noted  -Chart review notes h/o 5 PEG tube replacements, last in 2022 with no note on when removed  -7/21 CT CAP demonstrated markedly distended rectum with mild thickening of the lower rectum and anorectal junction with large stool ball and mesorectal fat stranding, likely representing fecal impaction with stercoral proctitis  -On Senna at Home  Plan:  -Pending suppositories   -Holding Senna in setting of NPO  -Nutrition Consult  -Currently NPO  -See infectious workup    ENDOCRINOLOGY:  # Hyperglycemia, likely HHS vs DKA  -Glucose 1,498 on admission  -Insulin Drip begun in ED  -Beta Hydroxybutyrate 0.24  -TSH 3.43  -Lactate 3.5  -Serum Osmolality: 416  -Ketones: Negative  Plan:  -Pending urine osmolality  -Pending urine electrolytes  -Pending HA1C  -Q4H RFP  -Fluids: 1L NS over 4 hours. Hesitant due to hypernatremia. Will adjust per nephrology recommendations  #Hyperkalemia  -On admission 7/21: K 6.1. Given 2g Calcium Gluconate in ED  Plan  -On fluids  -Q4H RFP  #Hypernatremia  -On  admission 7/21: Na 150, corrected Na 171  Plan  -Limiting hypernatremic fluids  -Q4H RFP  #Hypermagnesemia  -On admission 7/21: Mg 2.93  Plan  -On Fluids  -Q4H Mg labs  #Hypercalcemia  -On admission 7/21: Ca 10.7  -Protein Gap: Total Protein 8.7, Albumin 3.9. Concern for multiple myeloma low, but will workup.  Plan  -SPEP UPEP pending  -On Fluids  -Q4H RFP labs    HEMATOLOGY:  #Coagulopathy  -On admission 7/21: INR 1.7, PT 19.2  -H/o DVT/ PE, see cardiology  Plan:  -Monitor  -Heparin subcutaneous. Pending decision on if to do Heparin drip given history  #Reported H/o Anemia  -Reported home medication: Ferrous sulfate 325 mg   -On admission 7/21: Hemoglobin 13.8, Hematocrit elevated 50.3, MCV 97, MCHC 27.4 low, RDW 19.6 high  Plan  -Holding home medication in setting of possible constipation and infection    MUSCULOSKELETAL/ SKIN:  DALJIT    INFECTIOUS DISEASE:  #Suspected Pneumonia  -Given 2g Cefepime in ED 7/21  -Given 2g Vancomycin in ED 7/21  -7/21 CXR demonstrated bibasilar patchy airspace disease, worse at left lung base, with small left effusion  -7/21 Bcx * 2- pending  -Not septic, not on pressors   Plan:  -Vancomycin (7/21-present)  -MRSA nares  #Suspected Abdominal Infection  -Begun Meropenem in ED 7/21  -7/21 CT CAP demonstrated markedly distended rectum with mild thickening of the lower rectum and anorectal junction with large stool ball and mesorectal fat stranding, likely representing fecal impaction with stercoral proctitis  Plan:  -Metronidazole (7/21- present)  -Aztreonam (7/21-present)  ICU Check List   FEN  Fluids: 1L NS over 4 hours  Electrolytes: Will replete PRN, with goals of Mg >2, K>4  Nutrition: NPO  Prophylaxis:  DVT ppx: Heparin SC  GI ppx/Bowel care: None  Hardware:  Access: Endotracheal tube in place  Lines: PIV  Social:  Code: Full Code - confirmed with Radha, proxy, via phone 7/21/2024  HPOA: Radha Osullivan (POA) - [phone] - Aunt    Disposition: MICU    Shruti Oshea MD MPH    07/21/24 at 6:39 PM     Disclaimer: Documentation completed with the information available at the time of input. The times in the chart may not be reflective of actual patient care times, interventions, or procedures. Documentation occurs after the physical care of the patient.

## 2024-07-21 NOTE — CONSULTS
"Vancomycin Dosing by Pharmacy- Initial    Caryl Tucker is a 53 y.o. year old female who Pharmacy has been consulted for vancomycin dosing for intra-abdominal. Based on the patient's indication and renal status this patient is being dosed based on a goal AUC of 400-600.     Renal function can be described as Acute Kidney Injury    Renal function is decreased from baseline      Visit Vitals  BP (!) 166/95   Pulse 62   Temp 36.3 °C (97.3 °F) (Oral)   Resp 16        Lab Results   Component Value Date    CREATININE 3.57 (H) 07/21/2024    CREATININE 3.57 (H) 07/21/2024    CREATININE 0.72 07/09/2021    CREATININE 0.85 07/08/2021    CREATININE 0.85 07/07/2021    CREATININE 0.91 07/07/2021        Patient weight is No results found for: \"PTWEIGHT\"    No results found for: \"VANCORANDOM\", \"CULTURE\"     No intake/output data recorded.    Lab Results   Component Value Date    PATIENTTEMP 37.0 07/21/2024    PATIENTTEMP 37.0 07/21/2024          Assessment/Plan    Patient has already been given a loading dose of 2000 mg.  Since sCr is >2, will dose by level for now. Unsure if this is close to patient baseline (records are sparse).    Follow-up level will be ordered on 7/22 at 1400 unless clinically indicated sooner.  Will continue to monitor renal function daily while on vancomycin and order serum creatinine at least every 48 hours if not already ordered.  Follow for continued vancomycin needs, clinical response, and signs/symptoms of toxicity.     Thank you for allowing me to participate in the care of this patient.     Doris Altman, PharmD             "

## 2024-07-21 NOTE — ED PROCEDURE NOTE
Procedure  Intubation    Performed by: REYNA Jorge  Authorized by: Dwain Ross MD    Consent:     Consent obtained:  Emergent situation    Procedural risks discussed: not performed due to AMS.  Universal protocol:     Relevant documents present and verified: yes      Test results available: yes      Imaging studies available: yes      Required blood products, implants, devices, and special equipment available: yes      Immediately prior to procedure, a time out was called: yes      Patient identity confirmed:  Arm band  Pre-procedure details:     Indications: airway protection, altered consciousness, respiratory distress and respiratory failure      Patient status:  Altered mental status    Look externally: no concerns      Mouth opening - incisor distance:  2 finger widths    Hyoid-mental distance: 2 finger widths      Hyoid-thyroid distance: 2 or more finger widths      Mallampati score:  II    Obstruction: none      Neck mobility: normal      Pharmacologic strategy: RSI      Induction agents:  Etomidate    Paralytics:  Rocuronium  Procedure details:     Preoxygenation:  Nonrebreather mask    CPR in progress: no      Number of attempts:  1  Successful intubation attempt details:     Intubation method:  Oral    Intubation technique: video assisted      Laryngoscope blade:  Hypercurved    Bougie used: no      Grade view: II      Tube size (mm):  7.5    Tube type:  Cuffed    Tube visualized through cords: yes    Placement assessment:     ETT at teeth/gumline (cm):  23    Tube secured with:  ETT craft    Breath sounds:  Equal    Placement verification: chest rise, colorimetric ETCO2, CXR verification, direct visualization, equal breath sounds and waveform ETCO2      CXR findings:  Appropriate position  Post-procedure details:     Procedure completion:  Tolerated               REYNA Jorge  07/21/24 [id number]

## 2024-07-21 NOTE — ED TRIAGE NOTES
Patient reports to the ED via [facility] d/t altered mental status. Patient resides at [facility]. Nurse from facility say patient is more confused than usual and less responsive.

## 2024-07-21 NOTE — ED PROVIDER NOTES
HPI   Chief Complaint   Patient presents with    Altered Mental Status         History provided by:  EMS personnel  History limited by:  Acuity of condition and mental status change   used: No      53-year-old female presents via EMS from [facility] for evaluation of altered mental status.  Per EMS they state the patient has been altered since this morning and is normally interactive with staff.  They state that they found the patient hypoxic in the 80s on room air, tachycardic, hypotensive and hyperglycemic to the 400s.  Nursing staff provided no additional information.  History and ROS are otherwise limited due to the patient's altered mental status and condition.    GCS 10    Paper that was sent with patient shows that patient is a full code.  Does not confirm any of her medications and states that the chief complaint/reason for transfer is for gastrotomy tube blockage or displacement.    There were no family members or POA on this paper in order to contact.    I did attempt to speak with the nursing home 3 times to obtain further history and information and they hung up on me all 3 times.    Patient History   Past Medical History:   Diagnosis Date    Chronic pulmonary edema (HHS-HCC)     Chronic pulmonary edema    Dysarthria following cerebral infarction     Dysarthria following cerebral infarction    Dysphagia following cerebral infarction     Dysphagia following cerebrovascular accident (CVA)    Dysphagia, oropharyngeal phase     Oropharyngeal dysphagia    Facial weakness following cerebral infarction     Facial weakness following cerebral infarction    Hemiplegia and hemiparesis following cerebral infarction affecting left non-dominant side (Multi)     Hemiparesis of left nondominant side as late effect of cerebral infarction    Personal history of diseases of the blood and blood-forming organs and certain disorders involving the immune mechanism     History of anemia     Personal history of other diseases of the circulatory system     History of hypertension    Personal history of other diseases of the nervous system and sense organs     History of encephalopathy    Personal history of other specified conditions     History of dyspnea    Personal history of pulmonary embolism     History of pulmonary embolism     Past Surgical History:   Procedure Laterality Date    MR HEAD ANGIO WO IV CONTRAST  8/19/2016    MR HEAD ANGIO WO IV CONTRAST 8/19/2016 [facility] CLINICAL LEGACY    MR NECK ANGIO WO IV CONTRAST  8/19/2016    MR NECK ANGIO WO IV CONTRAST 8/19/2016 [facility] CLINICAL LEGACY    OTHER SURGICAL HISTORY  05/04/2018    Gastrostomy     No family history on file.  Social History     Tobacco Use    Smoking status: Not on file    Smokeless tobacco: Not on file   Substance Use Topics    Alcohol use: Not on file    Drug use: Not on file       Physical Exam   ED Triage Vitals [07/21/24 1046]   Temperature Heart Rate Respirations BP   36.3 °C (97.3 °F) 90 16 91/62      Pulse Ox Temp Source Heart Rate Source Patient Position   98 % Oral -- Lying      BP Location FiO2 (%)     Right arm --       Physical Exam  VS: As documented in the triage note and EMR flowsheet from this visit were reviewed.    GEN: Obese, ill-appearing, altered, mild distress noted  EYES:  EOMs grossly intact, anicteric sclera, no nystagmus noted, clear and equal bilaterally, no foreign body noted  HEENT: Airway patent, ears with clear tympanic membranes bilaterally. Nasal mucosa clear. Mouth with normal mucosa.  No area of abscess or fluctuance noted.  No drainage noted. Throat has no vesicles, no oropharyngeal exudates and uvula is midline. Face with no lymph node enlargement. No trismus or drooling noted.  Handling secretions.  Speech muffled at baseline per stroke medical history.  CARD: RRR, nontender chest, no crepitus deformities, no JVD, no murmurs rubs or gallops ; +1 bilateral lower extremity pitting edema noted.   Positive pulses bilaterally throughout.  Capillary refill less than 3 seconds.  No abnormal redness, warmth, tenderness noted to bilateral lower extremities.  PULMONARY: Rhonchorous all lung fields. Moving air well, labored, no accessory muscle use, tachypnea noted  ABDOMEN: Abdomen soft, non-distended, no rebound, no guarding. Bowel sounds normal in all 4 quadrants. No grimace to palpation.  No CVA tenderness.  No masses or organomegaly noted.  No evidence of peritonitis.  No G-tube noted.  : deferred  MUSK: Spine appears normal, range of motion is limited at medical baseline, no muscle or joint tenderness.  Generalized weakness noted, worse on the left side for medical baseline.  No step-offs, deformities or additional signs of trauma noted.  No spinal/midline grimace to palpation  SKIN: Skin normal color for race, warm, dry and intact. No evidence of trauma. No rash noted.  Prior abdominal G-tube site scarred over.  NEURO: Altered, speech is mumbled, no obvious deficits noted. No facial droop noted.    PSYCH: altered  LYMPH: No adenopathy or splenomegaly. No cervical, supraclavicular or inguinal lymphadenopathy.    ED Course & MDM   Diagnoses as of 07/21/24 1958   Rash and nonspecific skin eruption   Altered mental status, unspecified altered mental status type   Pneumonia of left lower lobe due to infectious organism   Acute on chronic respiratory failure with hypoxia and hypercapnia (Multi)   Hyperglycemic hyperosmolar nonketotic coma (Multi)   Septic shock (Multi)   ZULMA (acute kidney injury) (CMS-[facility])                                             Medical Decision Making    Upon assessment patient is in ill-appearing female in no apparent distress.  She is resting comfortably in the ED cart without difficulty or dyspnea.  She is placed on continuous cardiac monitor and pulse oximetry.  She is altered with mumbled speech but airway is currently intact at this time.  I am unsure what her baseline was as I was  unable to obtain collateral information from the nursing home.  She is noted to be hypoxic on room air at 90% and placed on 2 L supplemental oxygenation with improvement. additionally she is noted to be hypotensive to the 80s systolic and IV access is established.  Despite nursing home paperwork stating that she was sent in for G-tube displacement, there is no G-tube in place and there is no evidence that she currently has 1 as her prior G-tube site is scarred over.  I did attempt to call multiple times without success to obtain collateral information.  Additionally there is no record of her POA or family member in the chart in order to call.  She has no focal neurodeficits and has noted left sided extremity which is baseline per her past medical history.  She is responsive to verbal stimuli.  There are no signs of trauma noted.  She does not grimace with abdominal tenderness.  Plan is to obtain imaging and laboratory studies.  She is initiated on sepsis protocol including IV fluids and antibiotics but did not receive 30 cc/kg fluid bolus due to history of heart failure and I did not want to fluid overload the patient or throw her into flash pulmonary edema.    I performed a sepsis reperfusion exam on Monique Mays on 7/21/2024 1420 and despite this she did not improve and remained with blood pressures in the 70s/80s systolic.  She was initiated on a Levophed drip for further management with improvement/normalization of her blood pressures and perfusion.    Workup was reviewed.  Initial blood glucose level was 1498 and patient was initiated on an insulin drip per protocol with repeat laboratory studies per protocol.  Her potassium was 6.1 without any hyperkalemia EKG changes and as she is on insulin drip she this should correct but I initially gave the patient calcium.  Initial VBG was concerning for respiratory acidosis and she given back-to-back DuoNeb treatments in order to help improve this which would also help  out her hyperkalemia.  She is not a BiPAP candidate due to her altered mental status.  Lactate was noted to be elevating during her ED course of care and she additionally had a new ZULMA.  She additionally had hypernatremia which could be the cause of her altered mental status.  She was noted to come back COVID-positive.  Additionally patient had what appeared to be a focal seizure on the right side with an episode of unresponsiveness and desaturation and was placed on a nonrebreather.  Lasted approximately 30 seconds and this resolved on its own.  This could in addition be due to the patient's noted    Initial chest x-ray showed a left lower lobe pneumonia.  Unfortunately I could not get imaging studies with contrast dye due to her kidney function and these were changed to without contrast and reviewed.       Despite treatments patient's respiratory acidosis and mental status continue to decrease and I cannot place her on the BiPAP due to her altered mental status.  She was therefore intubated for airway protection and acute respiratory failure on first attempt by myself, see procedure note for details, postprocedure x-rays were reviewed with successful placement of ET tube.  Of note patient did have to be redosed on etomidate and rocuronium as her initial IV was noted to blow during etomidate administration and she was not adequately is sedated and paralyzed on first attempt.  Once a new IV was established RSI medications were administered with success.  She was started on postintubation propofol for sedation and placed in nonviolent wrist restraints.  Additionally a Vergara catheter and OG tube were placed for critical monitoring.    She remained otherwise hemodynamically stable on Levophed drip, IV fluids, postintubation sedation and on ventilator support.  She was ultimately admitted to the medical ICU for further inpatient management in critical condition.      EKG Interpretation: Normal sinus rhythm at a rate of  95, , QRS 96, QTc 422 without evidence of STEMI or ischemia    Differential Diagnoses Considered: Encephalopathy, acute intracranial process, PE, acute intra-abdominal process, sepsis, pneumonia, aspiration, electrolyte abnormality, dehydration, COVID, UTI, stroke, ACS    Chronic Medical Conditions Significantly Affecting Care: Prior stroke    External Records Reviewed: I reviewed recent and relevant outside records including: PCP notes, prior discharge summary, previous radiologic studies, HIE, EMS runsheet    Independent Interpretation of Studies:  I independently interpreted: Initial chest x-ray which was concerning for left lower lobe infiltrate, postintubation chest x-ray which revealed proper placement of ET tube    Escalation of Care:  Admission to medical ICU, Dr. Smith accepting attending    Social Determinants of Health Significantly Affecting Care:  Lacking transportation    Prescription Drug Consideration: N/A    Diagnostic testing considered: No additional indicated at this time    Discussion of Management with Other Providers:   I discussed the patient/results with: admitting team      This patient was staffed with ED Attending Dr. Ross to review the plan of care during ED course.    *Please note that portions of this note may have been completed with a voice recognition program.  Efforts were made to edit the dictations but occasionally, words are mis-transcribed.    Procedure  Procedures     MAK Jorge-CATERINA  07/21/24 2018       MAK Jorge-CATERINA  07/21/24 2048       MAK Jorge-CATERINA  07/22/24 1136

## 2024-07-21 NOTE — ED PROCEDURE NOTE
Procedure  Critical Care    Performed by: REYNA Jorge  Authorized by: Dwain Ross MD    Critical care provider statement:     Critical care time (minutes):  90    Critical care time was exclusive of:  Separately billable procedures and treating other patients    Critical care was necessary to treat or prevent imminent or life-threatening deterioration of the following conditions:  Renal failure, circulatory failure, respiratory failure, sepsis and metabolic crisis (AMS)    Critical care was time spent personally by me on the following activities:  Blood draw for specimens, ordering and performing treatments and interventions, development of treatment plan with patient or surrogate, ordering and review of laboratory studies, discussions with consultants, ordering and review of radiographic studies, pulse oximetry, evaluation of patient's response to treatment, re-evaluation of patient's condition, examination of patient, review of old charts and ventilator management    Care discussed with: admitting provider                 REYNA Jorge  07/21/24 1640

## 2024-07-22 ENCOUNTER — APPOINTMENT (OUTPATIENT)
Dept: CARDIOLOGY | Facility: HOSPITAL | Age: 54
DRG: 208 | End: 2024-07-22
Payer: MEDICARE

## 2024-07-22 ENCOUNTER — APPOINTMENT (OUTPATIENT)
Dept: CARDIOLOGY | Facility: HOSPITAL | Age: 54
End: 2024-07-22
Payer: MEDICARE

## 2024-07-22 ENCOUNTER — APPOINTMENT (OUTPATIENT)
Dept: RADIOLOGY | Facility: HOSPITAL | Age: 54
DRG: 208 | End: 2024-07-22
Payer: MEDICARE

## 2024-07-22 LAB
ALBUMIN SERPL BCP-MCNC: 3 G/DL (ref 3.4–5)
ALBUMIN SERPL BCP-MCNC: 3.1 G/DL (ref 3.4–5)
ALBUMIN SERPL BCP-MCNC: 3.4 G/DL (ref 3.4–5)
ALP SERPL-CCNC: 91 U/L (ref 33–110)
ALT SERPL W P-5'-P-CCNC: 18 U/L (ref 7–45)
ANION GAP BLDMV CALCULATED.4IONS-SCNC: 15 MMO/L (ref 10–25)
ANION GAP BLDV CALCULATED.4IONS-SCNC: 10 MMOL/L (ref 10–25)
ANION GAP BLDV CALCULATED.4IONS-SCNC: 13 MMOL/L (ref 10–25)
ANION GAP BLDV CALCULATED.4IONS-SCNC: 13 MMOL/L (ref 10–25)
ANION GAP BLDV CALCULATED.4IONS-SCNC: 18 MMOL/L (ref 10–25)
ANION GAP BLDV CALCULATED.4IONS-SCNC: 9 MMOL/L (ref 10–25)
ANION GAP BLDV CALCULATED.4IONS-SCNC: ABNORMAL MMOL/L
ANION GAP SERPL CALC-SCNC: 15 MMOL/L (ref 10–20)
ANION GAP SERPL CALC-SCNC: 16 MMOL/L (ref 10–20)
ANION GAP SERPL CALC-SCNC: 16 MMOL/L (ref 10–20)
ANION GAP SERPL CALC-SCNC: 18 MMOL/L (ref 10–20)
ANION GAP SERPL CALC-SCNC: 21 MMOL/L (ref 10–20)
AORTIC VALVE PEAK VELOCITY: 1.64 M/S
AST SERPL W P-5'-P-CCNC: 31 U/L (ref 9–39)
AV PEAK GRADIENT: 10.8 MMHG
AVA (PEAK VEL): 2.14 CM2
BASE EXCESS BLDMV CALC-SCNC: 8.6 MMOL/L (ref -2–3)
BASE EXCESS BLDV CALC-SCNC: 12.4 MMOL/L (ref -2–3)
BASE EXCESS BLDV CALC-SCNC: 4.6 MMOL/L (ref -2–3)
BASE EXCESS BLDV CALC-SCNC: 6.3 MMOL/L (ref -2–3)
BASE EXCESS BLDV CALC-SCNC: 6.6 MMOL/L (ref -2–3)
BASE EXCESS BLDV CALC-SCNC: 7.6 MMOL/L (ref -2–3)
BASE EXCESS BLDV CALC-SCNC: 8.9 MMOL/L (ref -2–3)
BILIRUB SERPL-MCNC: 0.9 MG/DL (ref 0–1.2)
BODY TEMPERATURE: 37 DEGREES CELSIUS
BUN SERPL-MCNC: 45 MG/DL (ref 6–23)
BUN SERPL-MCNC: 50 MG/DL (ref 6–23)
BUN SERPL-MCNC: 54 MG/DL (ref 6–23)
BUN SERPL-MCNC: 57 MG/DL (ref 6–23)
BUN SERPL-MCNC: 63 MG/DL (ref 6–23)
BUN SERPL-MCNC: 63 MG/DL (ref 6–23)
BUN SERPL-MCNC: 64 MG/DL (ref 6–23)
CA-I BLDMV-SCNC: 1.4 MMOL/L (ref 1.1–1.33)
CA-I BLDV-SCNC: 1.31 MMOL/L (ref 1.1–1.33)
CA-I BLDV-SCNC: 1.33 MMOL/L (ref 1.1–1.33)
CA-I BLDV-SCNC: 1.34 MMOL/L (ref 1.1–1.33)
CA-I BLDV-SCNC: 1.34 MMOL/L (ref 1.1–1.33)
CA-I BLDV-SCNC: 1.39 MMOL/L (ref 1.1–1.33)
CA-I BLDV-SCNC: ABNORMAL MMOL/L
CALCIUM SERPL-MCNC: 10.1 MG/DL (ref 8.6–10.6)
CALCIUM SERPL-MCNC: 10.2 MG/DL (ref 8.6–10.6)
CALCIUM SERPL-MCNC: 10.5 MG/DL (ref 8.6–10.6)
CALCIUM SERPL-MCNC: 11.5 MG/DL (ref 8.6–10.6)
CALCIUM SERPL-MCNC: 9.7 MG/DL (ref 8.6–10.6)
CALCIUM SERPL-MCNC: 9.9 MG/DL (ref 8.6–10.6)
CALCIUM SERPL-MCNC: 9.9 MG/DL (ref 8.6–10.6)
CARDIAC TROPONIN I PNL SERPL HS: 304 NG/L (ref 0–34)
CARDIAC TROPONIN I PNL SERPL HS: 955 NG/L (ref 0–34)
CHLORIDE BLD-SCNC: 114 MMOL/L (ref 98–107)
CHLORIDE BLDV-SCNC: 107 MMOL/L (ref 98–107)
CHLORIDE BLDV-SCNC: 112 MMOL/L (ref 98–107)
CHLORIDE BLDV-SCNC: 113 MMOL/L (ref 98–107)
CHLORIDE BLDV-SCNC: 115 MMOL/L (ref 98–107)
CHLORIDE BLDV-SCNC: 116 MMOL/L (ref 98–107)
CHLORIDE BLDV-SCNC: 116 MMOL/L (ref 98–107)
CHLORIDE SERPL-SCNC: 112 MMOL/L (ref 98–107)
CHLORIDE SERPL-SCNC: 113 MMOL/L (ref 98–107)
CHLORIDE SERPL-SCNC: 115 MMOL/L (ref 98–107)
CHLORIDE SERPL-SCNC: 115 MMOL/L (ref 98–107)
CHLORIDE SERPL-SCNC: 116 MMOL/L (ref 98–107)
CO2 SERPL-SCNC: 28 MMOL/L (ref 21–32)
CO2 SERPL-SCNC: 28 MMOL/L (ref 21–32)
CO2 SERPL-SCNC: 29 MMOL/L (ref 21–32)
CO2 SERPL-SCNC: 29 MMOL/L (ref 21–32)
CO2 SERPL-SCNC: 30 MMOL/L (ref 21–32)
CO2 SERPL-SCNC: 31 MMOL/L (ref 21–32)
CO2 SERPL-SCNC: 31 MMOL/L (ref 21–32)
CREAT SERPL-MCNC: 2.23 MG/DL (ref 0.5–1.05)
CREAT SERPL-MCNC: 2.36 MG/DL (ref 0.5–1.05)
CREAT SERPL-MCNC: 2.53 MG/DL (ref 0.5–1.05)
CREAT SERPL-MCNC: 2.59 MG/DL (ref 0.5–1.05)
CREAT SERPL-MCNC: 2.73 MG/DL (ref 0.5–1.05)
CREAT SERPL-MCNC: 2.73 MG/DL (ref 0.5–1.05)
CREAT SERPL-MCNC: 2.85 MG/DL (ref 0.5–1.05)
EGFRCR SERPLBLD CKD-EPI 2021: 19 ML/MIN/1.73M*2
EGFRCR SERPLBLD CKD-EPI 2021: 20 ML/MIN/1.73M*2
EGFRCR SERPLBLD CKD-EPI 2021: 20 ML/MIN/1.73M*2
EGFRCR SERPLBLD CKD-EPI 2021: 21 ML/MIN/1.73M*2
EGFRCR SERPLBLD CKD-EPI 2021: 22 ML/MIN/1.73M*2
EGFRCR SERPLBLD CKD-EPI 2021: 24 ML/MIN/1.73M*2
EGFRCR SERPLBLD CKD-EPI 2021: 25 ML/MIN/1.73M*2
EJECTION FRACTION APICAL 4 CHAMBER: 55.7
EJECTION FRACTION: 68 %
EJECTION FRACTION: 73 %
ERYTHROCYTE [DISTWIDTH] IN BLOOD BY AUTOMATED COUNT: 17.4 % (ref 11.5–14.5)
EST. AVERAGE GLUCOSE BLD GHB EST-MCNC: 301 MG/DL
GLUCOSE BLD MANUAL STRIP-MCNC: 187 MG/DL (ref 74–99)
GLUCOSE BLD MANUAL STRIP-MCNC: 188 MG/DL (ref 74–99)
GLUCOSE BLD MANUAL STRIP-MCNC: 203 MG/DL (ref 74–99)
GLUCOSE BLD MANUAL STRIP-MCNC: 204 MG/DL (ref 74–99)
GLUCOSE BLD MANUAL STRIP-MCNC: 206 MG/DL (ref 74–99)
GLUCOSE BLD MANUAL STRIP-MCNC: 228 MG/DL (ref 74–99)
GLUCOSE BLD MANUAL STRIP-MCNC: 231 MG/DL (ref 74–99)
GLUCOSE BLD MANUAL STRIP-MCNC: 234 MG/DL (ref 74–99)
GLUCOSE BLD MANUAL STRIP-MCNC: 247 MG/DL (ref 74–99)
GLUCOSE BLD MANUAL STRIP-MCNC: 273 MG/DL (ref 74–99)
GLUCOSE BLD MANUAL STRIP-MCNC: 278 MG/DL (ref 74–99)
GLUCOSE BLD MANUAL STRIP-MCNC: 303 MG/DL (ref 74–99)
GLUCOSE BLD MANUAL STRIP-MCNC: 307 MG/DL (ref 74–99)
GLUCOSE BLD MANUAL STRIP-MCNC: 314 MG/DL (ref 74–99)
GLUCOSE BLD MANUAL STRIP-MCNC: 323 MG/DL (ref 74–99)
GLUCOSE BLD MANUAL STRIP-MCNC: 340 MG/DL (ref 74–99)
GLUCOSE BLD MANUAL STRIP-MCNC: 394 MG/DL (ref 74–99)
GLUCOSE BLD-MCNC: 425 MG/DL (ref 74–99)
GLUCOSE BLDV-MCNC: 304 MG/DL (ref 74–99)
GLUCOSE BLDV-MCNC: 317 MG/DL (ref 74–99)
GLUCOSE BLDV-MCNC: 328 MG/DL (ref 74–99)
GLUCOSE BLDV-MCNC: 411 MG/DL (ref 74–99)
GLUCOSE BLDV-MCNC: 518 MG/DL (ref 74–99)
GLUCOSE BLDV-MCNC: >685 MG/DL (ref 74–99)
GLUCOSE SERPL-MCNC: 253 MG/DL (ref 74–99)
GLUCOSE SERPL-MCNC: 253 MG/DL (ref 74–99)
GLUCOSE SERPL-MCNC: 273 MG/DL (ref 74–99)
GLUCOSE SERPL-MCNC: 296 MG/DL (ref 74–99)
GLUCOSE SERPL-MCNC: 345 MG/DL (ref 74–99)
GLUCOSE SERPL-MCNC: 373 MG/DL (ref 74–99)
GLUCOSE SERPL-MCNC: 431 MG/DL (ref 74–99)
HBA1C MFR BLD: 12.1 %
HCO3 BLDMV-SCNC: 31.6 MMOL/L (ref 22–26)
HCO3 BLDV-SCNC: 30.5 MMOL/L (ref 22–26)
HCO3 BLDV-SCNC: 30.7 MMOL/L (ref 22–26)
HCO3 BLDV-SCNC: 31.8 MMOL/L (ref 22–26)
HCO3 BLDV-SCNC: 32 MMOL/L (ref 22–26)
HCO3 BLDV-SCNC: 32.3 MMOL/L (ref 22–26)
HCO3 BLDV-SCNC: 42.2 MMOL/L (ref 22–26)
HCT VFR BLD AUTO: 40.1 % (ref 36–46)
HCT VFR BLD EST: 36 % (ref 36–46)
HCT VFR BLD EST: 38 % (ref 36–46)
HCT VFR BLD EST: 38 % (ref 36–46)
HCT VFR BLD EST: 39 % (ref 36–46)
HCT VFR BLD EST: 39 % (ref 36–46)
HCT VFR BLD EST: 40 % (ref 36–46)
HCT VFR BLD EST: 42 % (ref 36–46)
HGB BLD-MCNC: 11.9 G/DL (ref 12–16)
HGB BLDMV-MCNC: 12.5 G/DL (ref 12–16)
HGB BLDV-MCNC: 12 G/DL (ref 12–16)
HGB BLDV-MCNC: 12.8 G/DL (ref 12–16)
HGB BLDV-MCNC: 12.9 G/DL (ref 12–16)
HGB BLDV-MCNC: 13 G/DL (ref 12–16)
HGB BLDV-MCNC: 13.4 G/DL (ref 12–16)
HGB BLDV-MCNC: 14 G/DL (ref 12–16)
INHALED O2 CONCENTRATION: 21 %
INHALED O2 CONCENTRATION: 40 %
INHALED O2 CONCENTRATION: 80 %
INHALED O2 CONCENTRATION: 80 %
LACTATE BLDMV-SCNC: 5.5 MMOL/L (ref 0.4–2)
LACTATE BLDV-SCNC: 2.3 MMOL/L (ref 0.4–2)
LACTATE BLDV-SCNC: 3.2 MMOL/L (ref 0.4–2)
LACTATE BLDV-SCNC: 3.3 MMOL/L (ref 0.4–2)
LACTATE BLDV-SCNC: 3.6 MMOL/L (ref 0.4–2)
LACTATE BLDV-SCNC: 3.7 MMOL/L (ref 0.4–2)
LACTATE BLDV-SCNC: 6.5 MMOL/L (ref 0.4–2)
LACTATE SERPL-SCNC: 8.3 MMOL/L (ref 0.4–2)
LEFT ATRIUM VOLUME AREA LENGTH INDEX BSA: 17.6 ML/M2
LEFT VENTRICLE INTERNAL DIMENSION DIASTOLE: 4.5 CM (ref 3.5–6)
LEFT VENTRICULAR OUTFLOW TRACT DIAMETER: 1.8 CM
MAGNESIUM SERPL-MCNC: 1.8 MG/DL (ref 1.6–2.4)
MAGNESIUM SERPL-MCNC: 1.9 MG/DL (ref 1.6–2.4)
MAGNESIUM SERPL-MCNC: 1.96 MG/DL (ref 1.6–2.4)
MAGNESIUM SERPL-MCNC: 2.05 MG/DL (ref 1.6–2.4)
MAGNESIUM SERPL-MCNC: 2.07 MG/DL (ref 1.6–2.4)
MAGNESIUM SERPL-MCNC: 2.81 MG/DL (ref 1.6–2.4)
MCH RBC QN AUTO: 27 PG (ref 26–34)
MCHC RBC AUTO-ENTMCNC: 29.7 G/DL (ref 32–36)
MCV RBC AUTO: 91 FL (ref 80–100)
MITRAL VALVE E/A RATIO: 0.75
NRBC BLD-RTO: 0.3 /100 WBCS (ref 0–0)
OSMOLALITY SERPL: 349 MOSM/KG (ref 280–300)
OSMOLALITY SERPL: 355 MOSM/KG (ref 280–300)
OSMOLALITY SERPL: 361 MOSM/KG (ref 280–300)
OSMOLALITY SERPL: 363 MOSM/KG (ref 280–300)
OSMOLALITY SERPL: 364 MOSM/KG (ref 280–300)
OSMOLALITY SERPL: 369 MOSM/KG (ref 280–300)
OSMOLALITY SERPL: 378 MOSM/KG (ref 280–300)
OXYHGB MFR BLDMV: 82.6 % (ref 45–75)
OXYHGB MFR BLDV: 49.3 % (ref 45–75)
OXYHGB MFR BLDV: 53.6 % (ref 45–75)
OXYHGB MFR BLDV: 70.3 % (ref 45–75)
OXYHGB MFR BLDV: 74.6 % (ref 45–75)
OXYHGB MFR BLDV: 79.2 % (ref 45–75)
OXYHGB MFR BLDV: 81.6 % (ref 45–75)
PCO2 BLDMV: 37 MM HG (ref 41–51)
PCO2 BLDV: 32 MM HG (ref 41–51)
PCO2 BLDV: 40 MM HG (ref 41–51)
PCO2 BLDV: 43 MM HG (ref 41–51)
PCO2 BLDV: 51 MM HG (ref 41–51)
PCO2 BLDV: 59 MM HG (ref 41–51)
PCO2 BLDV: 80 MM HG (ref 41–51)
PH BLDMV: 7.54 PH (ref 7.33–7.43)
PH BLDV: 7.33 PH (ref 7.33–7.43)
PH BLDV: 7.34 PH (ref 7.33–7.43)
PH BLDV: 7.41 PH (ref 7.33–7.43)
PH BLDV: 7.48 PH (ref 7.33–7.43)
PH BLDV: 7.49 PH (ref 7.33–7.43)
PH BLDV: 7.59 PH (ref 7.33–7.43)
PHOSPHATE SERPL-MCNC: 1.2 MG/DL (ref 2.5–4.9)
PHOSPHATE SERPL-MCNC: 1.3 MG/DL (ref 2.5–4.9)
PHOSPHATE SERPL-MCNC: 1.6 MG/DL (ref 2.5–4.9)
PHOSPHATE SERPL-MCNC: 2.2 MG/DL (ref 2.5–4.9)
PHOSPHATE SERPL-MCNC: 2.3 MG/DL (ref 2.5–4.9)
PHOSPHATE SERPL-MCNC: 3 MG/DL (ref 2.5–4.9)
PLATELET # BLD AUTO: 145 X10*3/UL (ref 150–450)
PO2 BLDMV: 49 MM HG (ref 35–45)
PO2 BLDV: 30 MM HG (ref 35–45)
PO2 BLDV: 38 MM HG (ref 35–45)
PO2 BLDV: 42 MM HG (ref 35–45)
PO2 BLDV: 43 MM HG (ref 35–45)
PO2 BLDV: 44 MM HG (ref 35–45)
PO2 BLDV: 57 MM HG (ref 35–45)
POTASSIUM BLDMV-SCNC: 3.6 MMOL/L (ref 3.5–5.3)
POTASSIUM BLDV-SCNC: 3.7 MMOL/L (ref 3.5–5.3)
POTASSIUM BLDV-SCNC: 3.8 MMOL/L (ref 3.5–5.3)
POTASSIUM BLDV-SCNC: 4.1 MMOL/L (ref 3.5–5.3)
POTASSIUM BLDV-SCNC: 4.1 MMOL/L (ref 3.5–5.3)
POTASSIUM BLDV-SCNC: 4.7 MMOL/L (ref 3.5–5.3)
POTASSIUM BLDV-SCNC: 6.6 MMOL/L (ref 3.5–5.3)
POTASSIUM SERPL-SCNC: 3.1 MMOL/L (ref 3.5–5.3)
POTASSIUM SERPL-SCNC: 3.2 MMOL/L (ref 3.5–5.3)
POTASSIUM SERPL-SCNC: 3.2 MMOL/L (ref 3.5–5.3)
POTASSIUM SERPL-SCNC: 3.7 MMOL/L (ref 3.5–5.3)
POTASSIUM SERPL-SCNC: 4 MMOL/L (ref 3.5–5.3)
POTASSIUM SERPL-SCNC: 4.2 MMOL/L (ref 3.5–5.3)
POTASSIUM SERPL-SCNC: 4.5 MMOL/L (ref 3.5–5.3)
PROT SERPL-MCNC: 6.9 G/DL (ref 6.4–8.2)
RBC # BLD AUTO: 4.41 X10*6/UL (ref 4–5.2)
RIGHT VENTRICLE FREE WALL PEAK S': 10 CM/S
RIGHT VENTRICLE PEAK SYSTOLIC PRESSURE: 21 MMHG
SAO2 % BLDMV: 85 % (ref 45–75)
SAO2 % BLDV: 51 % (ref 45–75)
SAO2 % BLDV: 55 % (ref 45–75)
SAO2 % BLDV: 72 % (ref 45–75)
SAO2 % BLDV: 76 % (ref 45–75)
SAO2 % BLDV: 81 % (ref 45–75)
SAO2 % BLDV: 82 % (ref 45–75)
SODIUM BLDMV-SCNC: 157 MMOL/L (ref 136–145)
SODIUM BLDV-SCNC: 152 MMOL/L (ref 136–145)
SODIUM BLDV-SCNC: 152 MMOL/L (ref 136–145)
SODIUM BLDV-SCNC: 153 MMOL/L (ref 136–145)
SODIUM BLDV-SCNC: 156 MMOL/L (ref 136–145)
SODIUM BLDV-SCNC: 158 MMOL/L (ref 136–145)
SODIUM BLDV-SCNC: ABNORMAL MMOL/L
SODIUM SERPL-SCNC: 153 MMOL/L (ref 136–145)
SODIUM SERPL-SCNC: 157 MMOL/L (ref 136–145)
SODIUM SERPL-SCNC: 157 MMOL/L (ref 136–145)
SODIUM SERPL-SCNC: 158 MMOL/L (ref 136–145)
SODIUM SERPL-SCNC: 160 MMOL/L (ref 136–145)
TRICUSPID ANNULAR PLANE SYSTOLIC EXCURSION: 1.7 CM
TRICUSPID ANNULAR PLANE SYSTOLIC EXCURSION: 2.2 CM
VANCOMYCIN SERPL-MCNC: 15.2 UG/ML (ref 5–20)
WBC # BLD AUTO: 19.1 X10*3/UL (ref 4.4–11.3)

## 2024-07-22 PROCEDURE — 82947 ASSAY GLUCOSE BLOOD QUANT: CPT

## 2024-07-22 PROCEDURE — 70450 CT HEAD/BRAIN W/O DYE: CPT

## 2024-07-22 PROCEDURE — 83735 ASSAY OF MAGNESIUM: CPT

## 2024-07-22 PROCEDURE — 84484 ASSAY OF TROPONIN QUANT: CPT

## 2024-07-22 PROCEDURE — 2500000004 HC RX 250 GENERAL PHARMACY W/ HCPCS (ALT 636 FOR OP/ED)

## 2024-07-22 PROCEDURE — 2500000005 HC RX 250 GENERAL PHARMACY W/O HCPCS

## 2024-07-22 PROCEDURE — 93308 TTE F-UP OR LMTD: CPT | Performed by: INTERNAL MEDICINE

## 2024-07-22 PROCEDURE — 93325 DOPPLER ECHO COLOR FLOW MAPG: CPT

## 2024-07-22 PROCEDURE — 84132 ASSAY OF SERUM POTASSIUM: CPT | Performed by: INTERNAL MEDICINE

## 2024-07-22 PROCEDURE — 93325 DOPPLER ECHO COLOR FLOW MAPG: CPT | Performed by: INTERNAL MEDICINE

## 2024-07-22 PROCEDURE — 5A2204Z RESTORATION OF CARDIAC RHYTHM, SINGLE: ICD-10-PCS | Performed by: INTERNAL MEDICINE

## 2024-07-22 PROCEDURE — 84132 ASSAY OF SERUM POTASSIUM: CPT

## 2024-07-22 PROCEDURE — 93005 ELECTROCARDIOGRAM TRACING: CPT

## 2024-07-22 PROCEDURE — 99222 1ST HOSP IP/OBS MODERATE 55: CPT | Performed by: INTERNAL MEDICINE

## 2024-07-22 PROCEDURE — 70450 CT HEAD/BRAIN W/O DYE: CPT | Performed by: RADIOLOGY

## 2024-07-22 PROCEDURE — 36415 COLL VENOUS BLD VENIPUNCTURE: CPT

## 2024-07-22 PROCEDURE — 93010 ELECTROCARDIOGRAM REPORT: CPT | Performed by: INTERNAL MEDICINE

## 2024-07-22 PROCEDURE — 71045 X-RAY EXAM CHEST 1 VIEW: CPT

## 2024-07-22 PROCEDURE — 71045 X-RAY EXAM CHEST 1 VIEW: CPT | Performed by: RADIOLOGY

## 2024-07-22 PROCEDURE — 83605 ASSAY OF LACTIC ACID: CPT | Performed by: INTERNAL MEDICINE

## 2024-07-22 PROCEDURE — 93306 TTE W/DOPPLER COMPLETE: CPT | Performed by: INTERNAL MEDICINE

## 2024-07-22 PROCEDURE — 99233 SBSQ HOSP IP/OBS HIGH 50: CPT

## 2024-07-22 PROCEDURE — 94003 VENT MGMT INPAT SUBQ DAY: CPT

## 2024-07-22 PROCEDURE — 92950 HEART/LUNG RESUSCITATION CPR: CPT

## 2024-07-22 PROCEDURE — 99291 CRITICAL CARE FIRST HOUR: CPT

## 2024-07-22 PROCEDURE — 2500000005 HC RX 250 GENERAL PHARMACY W/O HCPCS: Performed by: INTERNAL MEDICINE

## 2024-07-22 PROCEDURE — 83930 ASSAY OF BLOOD OSMOLALITY: CPT

## 2024-07-22 PROCEDURE — 37799 UNLISTED PX VASCULAR SURGERY: CPT | Performed by: INTERNAL MEDICINE

## 2024-07-22 PROCEDURE — 2020000001 HC ICU ROOM DAILY

## 2024-07-22 PROCEDURE — 93321 DOPPLER ECHO F-UP/LMTD STD: CPT | Performed by: INTERNAL MEDICINE

## 2024-07-22 PROCEDURE — 36415 COLL VENOUS BLD VENIPUNCTURE: CPT | Performed by: INTERNAL MEDICINE

## 2024-07-22 PROCEDURE — 93306 TTE W/DOPPLER COMPLETE: CPT

## 2024-07-22 PROCEDURE — 2500000004 HC RX 250 GENERAL PHARMACY W/ HCPCS (ALT 636 FOR OP/ED): Performed by: INTERNAL MEDICINE

## 2024-07-22 PROCEDURE — 80202 ASSAY OF VANCOMYCIN: CPT | Performed by: PHARMACIST

## 2024-07-22 PROCEDURE — 85027 COMPLETE CBC AUTOMATED: CPT

## 2024-07-22 PROCEDURE — 2500000001 HC RX 250 WO HCPCS SELF ADMINISTERED DRUGS (ALT 637 FOR MEDICARE OP)

## 2024-07-22 PROCEDURE — 37799 UNLISTED PX VASCULAR SURGERY: CPT

## 2024-07-22 RX ORDER — FENTANYL CITRATE 50 UG/ML
INJECTION, SOLUTION INTRAMUSCULAR; INTRAVENOUS
Status: COMPLETED
Start: 2024-07-22 | End: 2024-07-22

## 2024-07-22 RX ORDER — POTASSIUM CHLORIDE 1.5 G/1.58G
40 POWDER, FOR SOLUTION ORAL ONCE
Status: COMPLETED | OUTPATIENT
Start: 2024-07-22 | End: 2024-07-22

## 2024-07-22 RX ORDER — NOREPINEPHRINE BITARTRATE/D5W 8 MG/250ML
PLASTIC BAG, INJECTION (ML) INTRAVENOUS
Status: COMPLETED | OUTPATIENT
Start: 2024-07-22 | End: 2024-07-22

## 2024-07-22 RX ORDER — VANCOMYCIN HYDROCHLORIDE 1 G/200ML
10 INJECTION, SOLUTION INTRAVENOUS ONCE
Status: COMPLETED | OUTPATIENT
Start: 2024-07-22 | End: 2024-07-22

## 2024-07-22 RX ORDER — POTASSIUM CHLORIDE 29.8 MG/ML
40 INJECTION INTRAVENOUS ONCE
Status: COMPLETED | OUTPATIENT
Start: 2024-07-22 | End: 2024-07-22

## 2024-07-22 RX ORDER — POTASSIUM CHLORIDE 1.5 G/1.58G
40 POWDER, FOR SOLUTION ORAL EVERY 4 HOURS
Status: DISCONTINUED | OUTPATIENT
Start: 2024-07-22 | End: 2024-07-22

## 2024-07-22 RX ORDER — MAGNESIUM SULFATE HEPTAHYDRATE 40 MG/ML
2 INJECTION, SOLUTION INTRAVENOUS ONCE
Status: COMPLETED | OUTPATIENT
Start: 2024-07-22 | End: 2024-07-22

## 2024-07-22 RX ORDER — CHOLECALCIFEROL (VITAMIN D3) 125 MCG
125 CAPSULE ORAL DAILY
COMMUNITY
End: 2024-08-06 | Stop reason: HOSPADM

## 2024-07-22 RX ORDER — CALCIUM CHLORIDE INJECTION 100 MG/ML
INJECTION, SOLUTION INTRAVENOUS CODE/TRAUMA/SEDATION MEDICATION
Status: COMPLETED | OUTPATIENT
Start: 2024-07-22 | End: 2024-07-22

## 2024-07-22 RX ORDER — ACETAMINOPHEN 325 MG/1
650 TABLET ORAL EVERY 6 HOURS PRN
COMMUNITY
End: 2024-08-06 | Stop reason: HOSPADM

## 2024-07-22 RX ORDER — FENTANYL CITRATE-0.9 % NACL/PF 10 MCG/ML
25-200 PLASTIC BAG, INJECTION (ML) INTRAVENOUS CONTINUOUS
Status: DISCONTINUED | OUTPATIENT
Start: 2024-07-22 | End: 2024-07-23

## 2024-07-22 RX ORDER — SENNOSIDES 8.8 MG/5ML
5 LIQUID ORAL DAILY
COMMUNITY
End: 2024-08-06 | Stop reason: HOSPADM

## 2024-07-22 RX ORDER — DOCUSATE SODIUM 100 MG/1
100 CAPSULE, LIQUID FILLED ORAL 2 TIMES DAILY
COMMUNITY

## 2024-07-22 RX ORDER — POLYETHYLENE GLYCOL 3350 17 G/17G
17 POWDER, FOR SOLUTION ORAL DAILY PRN
COMMUNITY
End: 2024-08-06 | Stop reason: HOSPADM

## 2024-07-22 RX ORDER — FUROSEMIDE 20 MG/1
20 TABLET ORAL DAILY
Status: ON HOLD | COMMUNITY
End: 2024-08-06

## 2024-07-22 RX ORDER — POTASSIUM CHLORIDE 1.5 G/1.58G
20 POWDER, FOR SOLUTION ORAL ONCE
Status: COMPLETED | OUTPATIENT
Start: 2024-07-22 | End: 2024-07-22

## 2024-07-22 RX ORDER — PHENYLEPHRINE HCL IN 0.9% NACL 0.4MG/10ML
SYRINGE (ML) INTRAVENOUS
Status: DISPENSED
Start: 2024-07-22 | End: 2024-07-22

## 2024-07-22 RX ORDER — PHENYLEPHRINE HYDROCHLORIDE 10 MG/ML
INJECTION INTRAVENOUS
Status: DISPENSED
Start: 2024-07-22 | End: 2024-07-22

## 2024-07-22 RX ORDER — SODIUM CHLORIDE 9 MG/ML
250 INJECTION, SOLUTION INTRAVENOUS CONTINUOUS
Status: DISCONTINUED | OUTPATIENT
Start: 2024-07-22 | End: 2024-07-22

## 2024-07-22 RX ORDER — POTASSIUM CHLORIDE 29.8 MG/ML
40 INJECTION INTRAVENOUS ONCE
Status: COMPLETED | OUTPATIENT
Start: 2024-07-22 | End: 2024-07-23

## 2024-07-22 RX ORDER — DEXTROSE MONOHYDRATE AND SODIUM CHLORIDE 5; .9 G/100ML; G/100ML
100 INJECTION, SOLUTION INTRAVENOUS CONTINUOUS
Status: DISCONTINUED | OUTPATIENT
Start: 2024-07-23 | End: 2024-07-23

## 2024-07-22 RX ORDER — AMIODARONE HYDROCHLORIDE 150 MG/3ML
INJECTION, SOLUTION INTRAVENOUS CODE/TRAUMA/SEDATION MEDICATION
Status: COMPLETED | OUTPATIENT
Start: 2024-07-22 | End: 2024-07-22

## 2024-07-22 RX ORDER — DEXTROSE MONOHYDRATE AND SODIUM CHLORIDE 5; .45 G/100ML; G/100ML
75 INJECTION, SOLUTION INTRAVENOUS CONTINUOUS
Status: DISCONTINUED | OUTPATIENT
Start: 2024-07-22 | End: 2024-07-22

## 2024-07-22 RX ORDER — SODIUM CHLORIDE 450 MG/100ML
100 INJECTION, SOLUTION INTRAVENOUS CONTINUOUS
Status: DISCONTINUED | OUTPATIENT
Start: 2024-07-22 | End: 2024-07-24

## 2024-07-22 RX ORDER — POTASSIUM CHLORIDE 20 MEQ/1
20 TABLET, EXTENDED RELEASE ORAL DAILY
COMMUNITY
End: 2024-08-06 | Stop reason: HOSPADM

## 2024-07-22 RX ORDER — EPINEPHRINE 0.1 MG/ML
INJECTION INTRACARDIAC; INTRAVENOUS CODE/TRAUMA/SEDATION MEDICATION
Status: COMPLETED | OUTPATIENT
Start: 2024-07-22 | End: 2024-07-22

## 2024-07-22 RX ORDER — NOREPINEPHRINE BITARTRATE/D5W 8 MG/250ML
0-.5 PLASTIC BAG, INJECTION (ML) INTRAVENOUS CONTINUOUS
Status: DISCONTINUED | OUTPATIENT
Start: 2024-07-22 | End: 2024-07-25

## 2024-07-22 RX ORDER — ERGOCALCIFEROL 1.25 MG/1
1.25 CAPSULE ORAL
COMMUNITY

## 2024-07-22 RX ORDER — HYDROCHLOROTHIAZIDE 50 MG/1
50 TABLET ORAL DAILY
COMMUNITY
End: 2024-08-06 | Stop reason: HOSPADM

## 2024-07-22 RX ORDER — LISINOPRIL 40 MG/1
40 TABLET ORAL DAILY
COMMUNITY
End: 2024-08-06 | Stop reason: HOSPADM

## 2024-07-22 RX ORDER — POTASSIUM CHLORIDE 29.8 MG/ML
INJECTION INTRAVENOUS
Status: COMPLETED
Start: 2024-07-22 | End: 2024-07-22

## 2024-07-22 RX ORDER — NOREPINEPHRINE BITARTRATE/D5W 8 MG/250ML
PLASTIC BAG, INJECTION (ML) INTRAVENOUS
Status: DISPENSED
Start: 2024-07-22 | End: 2024-07-22

## 2024-07-22 RX ORDER — FENTANYL CITRATE-0.9 % NACL/PF 10 MCG/ML
PLASTIC BAG, INJECTION (ML) INTRAVENOUS
Status: COMPLETED
Start: 2024-07-22 | End: 2024-07-22

## 2024-07-22 RX ORDER — HEPARIN SODIUM 5000 [USP'U]/ML
5000 INJECTION, SOLUTION INTRAVENOUS; SUBCUTANEOUS EVERY 8 HOURS
Status: DISCONTINUED | OUTPATIENT
Start: 2024-07-22 | End: 2024-07-23

## 2024-07-22 RX ORDER — POTASSIUM CHLORIDE 29.8 MG/ML
40 INJECTION INTRAVENOUS ONCE
Status: DISCONTINUED | OUTPATIENT
Start: 2024-07-22 | End: 2024-07-22

## 2024-07-22 RX ORDER — INDOMETHACIN 25 MG/1
CAPSULE ORAL CODE/TRAUMA/SEDATION MEDICATION
Status: COMPLETED | OUTPATIENT
Start: 2024-07-22 | End: 2024-07-22

## 2024-07-22 RX ORDER — EPINEPHRINE 0.1 MG/ML
INJECTION INTRACARDIAC; INTRAVENOUS
Status: COMPLETED
Start: 2024-07-22 | End: 2024-07-22

## 2024-07-22 RX ORDER — ALBUTEROL SULFATE 0.83 MG/ML
2.5 SOLUTION RESPIRATORY (INHALATION) EVERY 8 HOURS PRN
COMMUNITY
End: 2024-08-06 | Stop reason: HOSPADM

## 2024-07-22 RX ORDER — EPINEPHRINE HCL IN DEXTROSE 5% 4MG/250ML
0-.2 PLASTIC BAG, INJECTION (ML) INTRAVENOUS CONTINUOUS
Status: DISCONTINUED | OUTPATIENT
Start: 2024-07-22 | End: 2024-07-22

## 2024-07-22 ASSESSMENT — PAIN - FUNCTIONAL ASSESSMENT
PAIN_FUNCTIONAL_ASSESSMENT: CPOT (CRITICAL CARE PAIN OBSERVATION TOOL)

## 2024-07-22 NOTE — CODE DOCUMENTATION
Code blue documentation:    Code duration: Total 36 minutes.  Patient intermittently has ROSC in between episodes. Longest code duration for 3 cycles.       Date/Time Event User Comments    07/21/24 0596 Code Start MAURY YATES team called to room    07/22/24 0028 Code End MAURY YATES --           Patient has V. fib cardiac arrest. S/p 3 shocks. Patient received epi x 3, amiodarone 300mg, calcium x 1 , HCO3 x 1 during the code. Patient achieved ROSC and was following commands post code. EKG with sinus rhythm. Patient was on norepi, epi and amiodarone drip post code.

## 2024-07-22 NOTE — H&P
"NEPHROLOGY NEW CONSULT NOTE   Caryl Tucker   53 y.o.    @WT@  MRN/Room: [id number]    Reason for consult: Hypernatremia    HPI:  Caryl Tucker is a 53 y.o. female with a past medical hx of CVA c/b left hemiparesis and dysphagia (2016) s/p PEG placement (removed unknown date), HTN, PE/DVT on Eliquis. Likely chronic respiratory acidosis. She was admitted to the MICU with HHS and Type II respiratory failure. Nephrology consulted for hypernatremia.      In The ER: BP 91/77   Pulse 82   Temp 35.9 °C (96.6 °F) (Temporal)   Resp 18   Ht 1.803 m (5' 11\")   Wt 101 kg (221 lb 9 oz)   SpO2 100%   BMI 30.90 kg/m²      Past Medical History:   Diagnosis Date    Anemia 2016    Chronic pulmonary edema (HHS-HCC) 2016    Dysarthria following cerebral infarction 2016    Dysphagia following cerebral infarction 2016    Dyspnea 2016    Encephalopathy 2016    Facial weakness following cerebral infarction 2016    Hemiplegia and hemiparesis following cerebral infarction affecting left non-dominant side (Multi) 2016    Hypertension 2016    Personal history of DVT (deep vein thrombosis) 2016    Personal history of pulmonary embolism 2016      Past Surgical History:   Procedure Laterality Date    GASTROSTOMY  08/31/2016    Originally placed 2016. Replaced 05/15/2018, 07/31/2018, 02/25/2021, 04/2021, 07/2021, 06/2022    MR HEAD ANGIO WO IV CONTRAST  08/19/2016    MR HEAD ANGIO WO IV CONTRAST 8/19/2016 [facility] CLINICAL LEGACY    MR NECK ANGIO WO IV CONTRAST  08/19/2016    MR NECK ANGIO WO IV CONTRAST 8/19/2016 [facility] CLINICAL LEGACY    PEG TUBE REMOVAL      Unknown Date      No family history on file.  Social History     Socioeconomic History    Marital status: Single     Spouse name: Not on file    Number of children: Not on file    Years of education: Not on file    Highest education level: Not on file   Occupational History    Not on file   Tobacco Use    Smoking status: Never    Smokeless tobacco: Not on file    Tobacco comments:     " Reported by patient in 2016.   Substance and Sexual Activity    Alcohol use: Never     Comment: Reported by patient in 2016.    Drug use: Never     Comment: Reported by patient in 2016.    Sexual activity: Not on file   Other Topics Concern    Not on file   Social History Narrative    Not on file     Social Determinants of Health     Financial Resource Strain: Patient Unable To Answer (7/21/2024)    Overall Financial Resource Strain (CARDIA)     Difficulty of Paying Living Expenses: Patient unable to answer   Food Insecurity: Not on file   Transportation Needs: Patient Unable To Answer (7/21/2024)    PRAPARE - Transportation     Lack of Transportation (Medical): Patient unable to answer     Lack of Transportation (Non-Medical): Patient unable to answer   Physical Activity: Not on file   Stress: Not on file   Social Connections: Not on file   Intimate Partner Violence: Not on file   Housing Stability: Patient Unable To Answer (7/21/2024)    Housing Stability Vital Sign     Unable to Pay for Housing in the Last Year: Patient unable to answer     Number of Times Moved in the Last Year: 1     Homeless in the Last Year: Patient unable to answer       Allergies   Allergen Reactions    Penicillins Anaphylaxis        No medications prior to admission.        Meds:   aztreonam, 1 g, Once  [START ON 7/22/2024] aztreonam (Azactam) 500 mg in dextrose 5% 50 mL IV, 500 mg, q8h  metroNIDAZOLE, 500 mg, q8h  oxygen, , Continuous - Inhalation      dextrose 10 % in water (D10W)  dextrose 10 % in water (D10W)  dextrose 10 % in water (D10W)  heparin, Last Rate: 1,000 Units/hr (07/21/24 2036)  insulin regular, Last Rate: 20 Units/hr (07/21/24 2000)  propofol, Last Rate: 15 mcg/kg/min (07/21/24 2100)      dextrose, 50 mL, PRN  heparin, 2,000-4,000 Units, q4h PRN  insulin regular, 0.1 Units/kg, PRN  propofol, 20 mg, q10 min PRN  vancomycin, , Daily PRN        Vitals:    07/21/24 2018   BP:    Pulse:    Resp:    Temp: 35.9 °C (96.6 °F)    SpO2:         07/20 0700 - 07/21 1859  In: 184.5 [I.V.:84.5]  Out: 500 [Urine:500]   Weight change:      General appearance: intubated and unresponsive  Eyes: non-icteric with dry mucus membranes  Lungs: bilateral rhonchi without crackles  Extremities: no edema bilat  : has Vergara      ASSESSMENT:  Caryl Tucker is a  53 y.o.  Year old , with a past medical hx of CVA c/b left hemiparesis and dysphagia (2016) s/p PEG placement (removed unknown date), HTN, PE/DVT on Eliquis. Likely chronic respiratory acidosis. She was admitted to the MICU with HHS and Type II respiratory failure. Nephrology consulted for hypernatremia.    == ZULMA, Non-oliguric  - in May Cr was <1 as obtained from another hospital.  - admission Cr 3.2,  - has urine output >100 ml per hour  - U/A +1 Protein and blood otherwise clear.  - No imaging to exclude obstruction.  - no indication for dialysis.    == Hypernatremia  - Her Glucose was 1400, Na 149  - Hypovolemic on exam and likely due to HHS.  - can't assess if hypernatremia is symptomatic.      Recommendations:  - N/S is an acceptable resuscitation solution.  - Check labs at least Q4-6 hours especially Na  - Consult Endocrinology  - Manage HHS as per ICU protocol.  - Ensure adequate intravascular volume while avoiding overload.  - Avoid nephrotoxins, contrast if possible  - strict Is/Os  - Renal dosing for medications for latest eGFR, follow medication trough as appropriate    Parviz aBins MD  Nephrology Fellow  24 hour Renal Pager - [phone]

## 2024-07-22 NOTE — PROGRESS NOTES
Social Work Transitional Care Note   - ICU TREATMENT PLAN: Patient was admitted to the MICU for acute hypercapnic respiratory failure requiring intubation as well as encephalopathy.   - Payer: [employer], [facility] Secondary Only.  -Support System: ANUP Garcia is listed as NOK.   - Planned Disposition: Pending medical outcome and rehab recommendations.  - Additional Information: None at this time.   - Barriers to discharge: None at this time. SW will continue to follow.  HOLGER MARIE

## 2024-07-22 NOTE — CARE PLAN
The patient's goals for the shift include      The clinical goals for the shift include Pt will remain safe during shift      Problem: Skin  Goal: Participates in plan/prevention/treatment measures  Outcome: Progressing  Flowsheets (Taken 7/22/2024 1101)  Participates in plan/prevention/treatment measures: Elevate heels  Goal: Prevent/manage excess moisture  Outcome: Progressing  Flowsheets (Taken 7/22/2024 1101)  Prevent/manage excess moisture: Monitor for/manage infection if present  Goal: Prevent/minimize sheer/friction injuries  Outcome: Progressing  Flowsheets (Taken 7/22/2024 1101)  Prevent/minimize sheer/friction injuries:   Complete micro-shifts as needed if patient unable. Adjust patient position to relieve pressure points, not a full turn   HOB 30 degrees or less   Turn/reposition every 2 hours/use positioning/transfer devices  Goal: Promote/optimize nutrition  Outcome: Progressing  Flowsheets (Taken 7/22/2024 1101)  Promote/optimize nutrition: Discuss with provider if NPO > 2 days  Goal: Promote skin healing  Outcome: Progressing  Flowsheets (Taken 7/22/2024 1101)  Promote skin healing:   Assess skin/pad under line(s)/device(s)   Protective dressings over bony prominences   Turn/reposition every 2 hours/use positioning/transfer devices

## 2024-07-22 NOTE — CONSULTS
"Nutrition Initial Assessment:   Nutrition Assessment    Reason for Assessment: Admission nursing screening (on vent)    Patient is a 53 y.o. female presenting to the MICU on 07/21/24 for acute hypercapnic respiratory failure requiring intubation, encephalopathy, hyperglycemia/HHS (glucose >1400, and hypernatremia. Vfib arrest overnight. In droplet precautions for +COVID.     PMH of CVA c/b left hemiparesis and dysphagia (2016) s/p PEG placement (2016, replaced 5 times, removed unknown date), HTN, PE/DVT on Eliquis     Nutrition History:  Food and Nutrient History: Currently NPO due to intubation. SBT in progress at time of visit, goal to extubate per RN. Pt with h/o PEG tube however has been removed. Fecal impaction seen on imaging.  Food Allergies/Intolerances:  None  GI Symptoms: Constipation > stool impaction  Oral Problems: None       Anthropometrics:  Height: 180.3 cm (5' 11\")   IBW/kg (Dietitian Calculated): 78.2 kg       Date/Time Weight   07/21/24 1708 101 kg (221 lb 9 oz)   07/21/24 1046 93 kg (205 lb) > BMI = 28.6         Weight History:   Wt Readings from Last 10 Encounters:   07/21/24 101 kg (221 lb 9 oz)   07/31/18 80.2 kg (176 lb 12.9 oz)   05/14/18 79.8 kg (175 lb 14.8 oz)       Weight Change %:  Weight History / % Weight Change: 93 kgs used for calculations    Nutrition Focused Physical Exam Findings:  Subcutaneous Fat Loss:   Orbital Fat Pads: Defer (droplet precautions)  Muscle Wasting:     Edema:  Edema: +2 mild, +3 moderate  Edema Location: +1 left upper extremity, +2 generalized and +3 BLE  Physical Findings:  Skin: Negative (no breakdown per flow sheets)    Nutrition Significant Labs:  CBC Trend:   Results from last 7 days   Lab Units 07/22/24  0010 07/21/24  1121   WBC AUTO x10*3/uL 19.1* 7.7   RBC AUTO x10*6/uL 4.41 5.19   HEMOGLOBIN g/dL 11.9* 13.8   HEMATOCRIT % 40.1 50.3*   MCV fL 91 97   PLATELETS AUTO x10*3/uL 145* 165    , A1C:No results found for: \"HGBA1C\", BG POCT trend:   Results " from last 7 days   Lab Units 07/22/24  1126 07/22/24  0945 07/22/24  0759 07/22/24  0516 07/22/24  0419   POCT GLUCOSE mg/dL 323* 394* 340* 323* 307*    , Liver Function Trend:   Results from last 7 days   Lab Units 07/22/24  0010 07/21/24  1121   ALK PHOS U/L 91 128*   AST U/L 31 13   ALT U/L 18 9   BILIRUBIN TOTAL mg/dL 0.9 0.7    , Renal Lab Trend:   Results from last 7 days   Lab Units 07/22/24  0854 07/22/24  0326 07/22/24  0010 07/21/24  2203   POTASSIUM mmol/L 4.2 3.1* 3.2*  3.2* 3.4*   PHOSPHORUS mg/dL 1.3* 1.2* 1.6* 1.2*   SODIUM mmol/L 157* 160* 160*  160* 159*   MAGNESIUM mg/dL 2.07 1.80 1.96 2.21   EGFR mL/min/1.73m*2 21* 19* 20*  20* 19*   BUN mg/dL 57* 64* 63*  63* 69*   CREATININE mg/dL 2.59* 2.85* 2.73*  2.73* 2.90*       Nutrition Specific Medications:  aztreonam (Azactam) 500 mg in dextrose 5% 50 mL IV, 500 mg, intravenous, q8h  metroNIDAZOLE, 500 mg, intravenous, q8h    Continuous medications  dextrose 10 % in water (D10W), 150 mL/hr, Last Rate: 150 mL/hr (07/21/24 2327)  dextrose 10 % in water (D10W), 150 mL/hr, Last Rate: Stopped (07/22/24 0030)  fentaNYL,  mcg/hr, Last Rate: Stopped (07/22/24 1028)  insulin regular, 0-50 Units/hr, Last Rate: 5 Units/hr (07/22/24 1200)  norepinephrine, 0-0.5 mcg/kg/min, Last Rate: 0.0829 mcg/kg/min (07/22/24 1200)  propofol, 5-50 mcg/kg/min, Last Rate: Stopped (07/22/24 1028)  sodium chloride, 250 mL/hr      I/O:   Last BM Date: 07/22/24; Stool Appearance: Soft (07/22/24 0300)    Dietary Orders (From admission, onward)       Start     Ordered    07/21/24 1701  NPO Diet; Effective now  Diet effective now         07/21/24 1700                     Estimated Needs:   Total Energy Estimated Needs (kCal): 1700 kCal  Method for Estimating Needs: Aquiles state equation using 93 kgs  Total Protein Estimated Needs (g): 100 g  Method for Estimating Needs: ~1.3 gm/kg ideal BW  Total Fluid Estimated Needs (mL):  (per team)           Nutrition Diagnosis   Malnutrition  Diagnosis  Patient has Malnutrition Diagnosis:  (unable to determine)    Nutrition Diagnosis  Patient has Nutrition Diagnosis: Yes  Diagnosis Status (1): New  Nutrition Diagnosis 1: Increased nutrient needs  Related to (1): altered metabolism  As Evidenced by (1): critilcal illness, covid infection       Nutrition Interventions/Recommendations         Nutrition Prescription:  Individualized Nutrition Prescription Provided for : tube feeds on vent if able to promote BM, glycemic control and electrolyte abnormalities improve        Nutrition Interventions: Interventions: Enteral intake > Goal:     Glucerna 1.5 @ 45 mls/hr continuous  = 1620 kcals and 89 gm protein  Begin @ 10mls/hr, increase by 10mls every 6-8 hours as tolerated  Reduce TF goal rate @ 40 mls/hr if propofol resumed         Nutrition Monitoring and Evaluation   Food/Nutrient Related History Monitoring  Monitoring and Evaluation Plan: Enteral and parenteral nutrition intake  Enteral and Parenteral Nutrition Intake: Enteral nutrition intake, Enteral nutrition formula/solution  Criteria: if unable to extubate, EN support to meet >75% est needs         Biochemical Data, Medical Tests and Procedures  Monitoring and Evaluation Plan: Electrolyte/renal panel, Glucose/endocrine profile  Criteria: WNL  Criteria: ICU BG control 140-180 mg/dL              Time Spent (min): 60 minutes

## 2024-07-22 NOTE — PROGRESS NOTES
Medical Intensive Care - Daily Progress Note   Michele Tucker is a 53 y.o. year old female patient admitted on 7/21/2024 with following ICU needs: acute hypercapnic respiratory failure requiring intubation as well as encephalopathy, likely secondary to hyperglycemia and hypernatremia.     Interval History:  Overnight the patient experienced an episode of V. Fib cardiac arrest requiring 3 shocks, epi x 3, amiodarone 300mg, calcium x 1 , HCO3 x 1 during the code. Patient achieved ROSC and was following commands post code. EKG with sinus rhythm. Patient was on norepi, epi and amiodarone drip post code. Due to her hypokalemia, amiodarone drip was held pending electrolyte repletion. Insulin was also held in the setting of hypokalemia.    COVID positive, asymptomatic    Morning, Norepi @ 0.09, Propofol at 30, fentanyl 50, Kphos and Kcl running  Meds    Scheduled medications  aztreonam (Azactam) 500 mg in dextrose 5% 50 mL IV, 500 mg, intravenous, q8h  heparin (porcine), 5,000 Units, subcutaneous, q8h  metroNIDAZOLE, 500 mg, intravenous, q8h  oxygen, , inhalation, Continuous - Inhalation  perflutren lipid microspheres, 0.5-10 mL of dilution, intravenous, Once in imaging  perflutren protein A microsphere, 0.5 mL, intravenous, Once in imaging  potassium chloride, 20 mEq, oral, Once  potassium chloride, 40 mEq, intravenous, Once  sulfur hexafluoride microsphr, 2 mL, intravenous, Once in imaging  vancomycin, 10 mg/kg, intravenous, Once      Continuous medications  dextrose 10 % in water (D10W), 150 mL/hr, Last Rate: 150 mL/hr (07/21/24 2327)  dextrose 10 % in water (D10W), 150 mL/hr, Last Rate: Stopped (07/22/24 0030)  fentaNYL,  mcg/hr, Last Rate: Stopped (07/22/24 1028)  insulin regular, 0-50 Units/hr, Last Rate: 5 Units/hr (07/22/24 1200)  norepinephrine, 0-0.5 mcg/kg/min, Last Rate: 0.0829 mcg/kg/min (07/22/24 1200)  propofol, 5-50 mcg/kg/min, Last Rate: Stopped (07/22/24 1028)  sodium chloride, 250  "mL/hr      PRN medications  PRN medications: dextrose, insulin regular, propofol, vancomycin     Objective    VITALS  Visit Vitals  BP 91/69   Pulse 84   Temp 37 °C (98.6 °F)   Resp 15   Ht 1.803 m (5' 11\")   Wt 101 kg (221 lb 9 oz)   SpO2 100%   BMI 30.90 kg/m²   Smoking Status Never   BSA 2.25 m²        Physical Exam  Constitutional:       Appearance: She is obese.      Interventions: She is sedated and intubated.   HENT:      Mouth/Throat:      Mouth: Mucous membranes are dry.   Cardiovascular:      Rate and Rhythm: Normal rate and regular rhythm.      Pulses: Normal pulses.      Heart sounds: Heart sounds are distant.   Pulmonary:      Effort: Pulmonary effort is normal. No respiratory distress. She is intubated.      Breath sounds: No wheezing or rales.   Abdominal:      General: A surgical scar is present. Bowel sounds are normal.      Palpations: Abdomen is soft.   Musculoskeletal:      Right lower le+ Pitting Edema present.      Left lower le+ Pitting Edema present.   Skin:     General: Skin is warm.      Capillary Refill: Capillary refill takes less than 2 seconds.      Comments: Bilateral lower extremity skin dry and flaking with dark discoloration            Intake/Output Summary (Last 24 hours) at 2024 1430  Last data filed at 2024 1222  Gross per 24 hour   Intake 4307.21 ml   Output 2020 ml   Net 2287.21 ml     General Chemistry Labs  Results from last 72 hours   Lab Units 24  1150 24  0854 24  0326   SODIUM mmol/L 158* 157* 160*   POTASSIUM mmol/L 3.7 4.2 3.1*   CHLORIDE mmol/L 116* 115* 112*   CO2 mmol/L 31 28 30   BUN mg/dL 54* 57* 64*   CREATININE mg/dL 2.53* 2.59* 2.85*   GLUCOSE mg/dL 373* 431* 345*   CALCIUM mg/dL 10.2 10.5 11.5*   ANION GAP mmol/L 15 18 21*   EGFR mL/min/1.73m*2 22* 21* 19*   PHOSPHORUS mg/dL 3.0 1.3* 1.2*   MAGNESIUM mg/dL 2.05 2.07 1.80      CBC  Results from last 72 hours   Lab Units 24  0010 24  1121   WBC AUTO x10*3/uL 19.1* " 7.7   HEMOGLOBIN g/dL 11.9* 13.8   HEMATOCRIT % 40.1 50.3*   PLATELETS AUTO x10*3/uL 145* 165   NEUTROS PCT AUTO %  --  67.2   LYMPHS PCT AUTO %  --  14.6   MONOS PCT AUTO %  --  16.6   EOS PCT AUTO %  --  0.7     Hepatic Labs  Results from last 72 hours   Lab Units 07/22/24  0010 07/21/24  1739 07/21/24  1121   ALT U/L 18  --  9   AST U/L 31  --  13   BILIRUBIN TOTAL mg/dL 0.9  --  0.7   PROTEIN TOTAL g/dL 6.9 8.0 8.7*     MELD 3.0: 23 at 7/22/2024 11:50 AM  MELD-Na: 21 at 7/22/2024 11:50 AM  Calculated from:  Serum Creatinine: 2.53 mg/dL at 7/22/2024 11:50 AM  Serum Sodium: 158 mmol/L (Using max of 137 mmol/L) at 7/22/2024 11:50 AM  Total Bilirubin: 0.9 mg/dL (Using min of 1 mg/dL) at 7/22/2024 12:10 AM  Serum Albumin: 3.0 g/dL at 7/22/2024 11:50 AM  INR(ratio): 1.7 at 7/21/2024 11:21 AM  Age at listing (hypothetical): 53 years  Sex: Female at 7/22/2024 11:50 AM    Coagulation Labs  Results from last 72 hours   Lab Units 07/21/24  1121   INR  1.7*   PROTIME seconds 19.2*     Cardiac Labs  Results from last 72 hours   Lab Units 07/22/24  0010 07/21/24  1121   TROPHSCMC ng/L 304* 28   BNP pg/mL  --  6     Micro/ID:   Lab Results   Component Value Date    BLOODCULT No growth at 1 day 07/21/2024    BLOODCULT  07/21/2024     Identification and susceptibility testing to follow     Summary of key imaging results from the last 24 hours  XR CHEST 1 VIEW;  7/21/2024 1:19pm  1.  Left basilar dense airspace disease concerning for pneumonia versus atelectasis  2. Low lung volumes   CT HEAD WO IV CONTRAST; 7/21/2024 2:02 pm   Minimal cortical volume loss without hydrocephalus, hemorrhage or extra-axial collection.  XR Abdomen 7/21/2024  1.  Nonobstructive bowel gas pattern with enteric tube as described above.  2. Left lung base consolidation.   CT CHEST ABDOMEN PELVIS WO CONTRAST; 7/21/2024 2:02 pm  Chest  1. No acute process in the chest.  2. Dilated main pulmonary artery. Correlate with pulmonary arterial  hypertension.  Abdomen-Pelvis  1. Markedly distended rectum with mild thickening of the lower rectum and anorectal junction with a large stool ball and mesorectal fat stranding. Findings likely represent fecal impaction with stercoral proctitis.  2. Otherwise no acute process in the abdomen and pelvis.  CXR 7/22/2024  1. Interval placement of a right IJ central venous catheter with the  tip projecting over lower SVC.  2. Unchanged consolidation in left lung base. Correlate with concern  for infection.  3. Small left basilar pleural effusion, also stable.  CT Head 7/22/2024  The examination is mildly degraded by patient motion artifact. No acute intracranial hemorrhage or mass effect. Mild degree of nonspecific white matter hypodensity compatible with microangiopathy.  TTE 7/22/2024   1. The left ventricular systolic function is normal, with a visually estimated ejection fraction of 70-75%.  2. Spectral Doppler shows an impaired relaxation pattern of left ventricular diastolic filling.  3. There is no evidence of left ventricular hypertrophy.  4. There is normal right ventricular global systolic function.  Assessment and Plan   Assessment:   Caryl Tucker is a 53 y.o. year old female patient w/ a PMH significant for PMH of CVA c/b left hemiparesis and dysphagia (2016) s/p PEG placement (2016, replaced 5 times, removed unknown date), HTN, PE/DVT on Eliquis, who was admitted to the MICU on 07/21/24 for acute hypercapnic respiratory failure requiring intubation, encephalopathy, hyperglycemia, and hypernatremia. Vfib arrest occurred 7/22- ROSC achieved. Currently on ventilator, being managed with insulin and cautious fluids for electrolyte abnormalities.     Mechanical Ventilation: < 4 days  Sedation/Analgesia: weaned, none at present  Restraints: Restraints indicated as alternative therapies have been attempted and have been ineffective.  Restrain with soft wrist restraints and side rails up x4 until medical devices  discontinued and/or patient able to participate with plan of care    Summary for 07/22/24  :  Replete K  1/2 ns @ 250cc/HR- Goal of 3.4L free water deficit to manage hypernatremia with concurrent hyperglycemia  Weaning pressors and sedation  Insulin drip resumed. Running  Holding heparin drip    Plan:  NEUROLOGY/PSYCH:  #Encephalopathy, likely metabolic, secondary to hypernatremia vs hyperglycemia  #H/o CVA causing hemiplegia, hemiparesis, dysphagia, and dysarthria 2016  -Baseline AAO*3, on presentation altered  -Responding to commands, able to move right side. Likely no additional neurologic changes due to cardiac arrest 7/22 due to consistent physical exam.  -Goal RASS: -2  -7/21 on admission: Na 150, corrected 171  -7/21 on admission: Glucose >1400   -Admission Urine Drug Screen: Normal  -7/21 CT head: minimal cortical volume loss without hydrocephalus, hemorrhage, or extra-axial collection  -7/22 CT head: no intracranial hemorrhage  Plan:  -Will monitor  -Weaning pressors and sedation  -Treating metabolic derangements as per below, see Endocrinology     CARDIOVASCULAR:  #Shock  -DDX: Cardiovascular, secondary to arrest, septic, hypovolemic secondary to HHS  -Patient currently on pressors. Elevated lactate  -Patient presented with a BP of 91/ 62. In ED placed on 8mg norepinephrine; drip discontinued due to repeat /95 and at time decreased suspicion for shock. Not on pressors at time of arrival to ICU.  -7/22-Patient coded. S/p code- Norepinephrine begun  -Reported home medication: HCTZ 12.5mg, Lisinopril 20mg   Plan:  -Holding home medications in setting of ZULMA  -Weaning pressors and sedation  -Will monitor for hypotension and symptoms of hypoperfusion  -See workup under ID for possible infection    #H/o DVT and PE, 2016, on Eliquis  -CT- No evidence of right heart strain  Plan  -Holding Eliquis inpatient  -Heparin Drip being held in setting of cardiac arrest. Heparin subcutaneous TID    #S/P Vfib arrest  7/22  -On admission 7/21: Troponin 28, BNP 6  -S/P Code 7/22- Trop: 304  -Requiring 3 shocks, epi x 3, amiodarone 300mg, calcium x 1 , HCO3 x 1 during the code. Patient achieved ROSC and was following commands   -Holding heparin drip. Heparin subcutaneous TID    PULMONARY:  #Acute Hypoxic Hypercapnic Respiratory Failure, requiring intubation  #Primary Respiratory Acidosis with Secondary Metabolic Alkalosis  -On admission 7/21:  Bicarbonate 35  -7/21 ventilated on admission  Plan:  -Weaning pressors and sedation  -Q2H VBG     RENAL/GENITOURINARY:  #ZULMA  -2021: BUN 20s, Cr 0.85  -On admission: BUN 80, Cr 3.57, AG 19, Egfr 14  Plan:  -Nephrology consulted  -Avoiding nephrotoxic medications  -For fluid management, see Endocrinology     GASTROENTEROLOGY:  #H/o Gastrostomy Tube  #Fecal Impaction on Imaging  -Chart review shows h/o PEG tube, originally placed 2016, s/p 6 replacements, last in 2022, with no note on when removed. On admission: No PEG tube in place, healed abdominal scar noted.  -7/21 CT CAP demonstrated markedly distended rectum with mild thickening of the lower rectum and anorectal junction with large stool ball and mesorectal fat stranding, likely representing fecal impaction with stercoral proctitis  -Home medication: On Senna   Plan:  -Pending suppositories   -Currently NPO  -Holding Senna in setting of NPO  -Nutrition Consult  -See infectious workup     ENDOCRINOLOGY:  #Hyperglycemia, likely HHS  #Hyponatremia  -Glucose 1,498, Na 150, corrected Na 171 on admission  -Relevant labs: Beta Hydroxybutyrate 0.24, TSH 3.43, Lactate 3.5  -Serum Osmolality: 416 -> 369.   -Urine Osmolality: 455 (nl)  -Urine Electrolytes:  Na 91, K 37, Cr 23.9, Chloride 108; abnormal: chloride/Cr ratio: 452  -Insulin Drip begun in ED. Held 7/22 in setting of hypokalemia, resumed s/p repleting K  -7/22: Glucose 373, Na 158  Plan:  -Pending HA1C  -Q4H RFP  -Fluids: Administering cautious fluids to address free water deficit. Free  water defecit today 3.4L to correct Na to 150. Will give 1.2 NS at 250 ml/hr over 24 hours for goal of 3L.  -Insulin drip  -Will monitor urine output as repleting fluids  -Will consider D5w as trending sodium  -Engaged nephrology for fluid recommendations    #Hypernatremia  -On admission 7/21:   -7/22: Na 160  Plan  -Limiting hypernatremic fluids  -Q4H RFP    #Hyperkalemia  -On admission 7/21: K 6.1. Given 2g Calcium Gluconate in ED  -7/22 - K dropped to 3.1- labs reflecting time patient coded  -Given Kphos  -Given Kcl  -7/22 afternoon: K 3.7  Plan  -Q4H RFP labs  -Will replete PRN, goal K > 4    #Hypermagnesemia, resolved  -On admission 7/21: Mg 2.93  -Repleting fluids. Mg 2.05 7/22  Plan  -Q4H Mg labs  -Fluid repletion     #Hypercalcemia, improving  #Hypoalbuminemia in setting of hypercalcemia  -Admission 7/21: Ca 10.7, + Protein Gap: Total Protein 8.7, Albumin 3.9.   -Concern for multiple myeloma low, but will workup.  -Likely due to hypovolemia  -Ca remaining stable 10-11 during admission.  -7/22: Albumin 3.0, increased protein gap  Plan  -SPEP UPEP pending  -Q4H RFP labs to trend Ca and albumin  -Fluid repletion     HEMATOLOGY:  #Coagulopathy  -On admission 7/21: INR 1.7, PT 19.2  -H/o DVT/ PE  Plan:  -For workup, see cardiology    #Reported H/o Anemia  -Home medication: Ferrous sulfate 325 mg   -Admission 7/21: Hgb 13.8, HCT 50.3, MCV 97, MCHC 27.4, RDW 19.6   -T&S Q72H; last 7/21/2024  Plan  -Holding ferrous sulfate in setting of possible fecal impaction and infection  -Monitoring CBCs     MUSCULOSKELETAL/ SKIN:  DALJIT     INFECTIOUS DISEASE:  #Possible Pneumonia  -Given 2g Cefepime, 1g Meropenem, and 2g Vancomycin in ED 7/21  -7/21 CXR- bibasilar patchy airspace disease, worse at left lung base, small left effusion  -7/21 Bcx * 2- NG1D  -Covid- positive  Plan:  -Vancomycin (7/21-present)    #Possible Abdominal Infection  -Given 2g Cefepime, 1g Meropenem, and 2g Vancomycin in ED 7/21  -7/21 CT CAP- markedly  distended rectum with mild thickening of the lower rectum and anorectal junction with large stool ball and mesorectal fat stranding, likely representing fecal impaction with stercoral proctitis  Plan:  -Metronidazole (7/21- present)  -Aztreonam (7/21-present)  -For stool burden workup, see GI    ICU Check List   FEN  Fluids: 1L NS over 4 hours  Electrolytes: Will replete PRN, with goals of Mg >2, K>4  Nutrition: NPO  Prophylaxis:  DVT ppx: Heparin subcutaneous TID  GI ppx/Bowel care: None  Hardware:  Access: Endotracheal tube in place  Lines: PIV  Social:  Code: Full Code - confirmed with Radha, proxy, via phone 7/21/2024  HPOA: Radha Osullivan ([facility]) - [phone] - Aunt    Disposition: VELASQUEZ Oshea MD MPH   07/22/24 at 2:30 PM     Disclaimer: Documentation completed with the information available at the time of input. The times in the chart may not be reflective of actual patient care times, interventions, or procedures. Documentation occurs after the physical care of the patient.

## 2024-07-22 NOTE — CONSULTS
Inpatient consult to Cardiology  Consult performed by: Kylee Antonio MD  Consult ordered by: Jennie Smith MD        Inpatient Cardiology Consult Note    Reason for consult: V fib arrest     HPI:   Caryl Tucker  is a 52 y/o F w/ a PMH of CVA c/b left hemiparesis and dysphagia (2016) s/p PEG placement (2016, replaced 5 times, removed unknown date), HTN, PE/DVT on Eliquis who was sent to the ED by [facility] for evaluation of mental status. Nursing facility says patient is normally awake, alert, and able to interact with staff. This morning they found the patient hypoxic in the 80s on room air, tachycardic, hypotensive, hyperglycemic to the 400s, and unable to answer questions with drool pooling in her mouth, prompting them to call EMS.     Patient is intubated sedated unable to provide history.     In the ED, she was found to have an ZULMA with hyperkalemia, hyperglycemia, glucose greater than 1400, and the CT concerning for proctitis, she was treated with antibiotics, started with insulin drip, required Levophed briefly, and was intubated for airway protection.  She has no known history of diabetes.  She was then admitted to the MICU for continued medical management of HHS.    Around 11:52 PM, patient was noted to go into pulseless V-fib arrest, per telemetry review patient had increasing episodes of NSVT and bigeminy prior to this arrest. The patient required 3 shocks. She received epi x 3, amiodarone 300mg, calcium x 1 , HCO3 x 1 during the code. She  intermittently had ROSC in between episodes.     EKG 7/21/2024 at 11:24 AM in the ED.:  Normal sinus rhythm, left axis deviation, no ST or T wave abnormality, QTc 422 ms.    EKG : 7/22/2024 1:22 AM post arrest in MICU: SR, concave ST elevation in V2, ST depression in I, aVL and V6.     All system reviewed and negative unless mentioned above.     Cardiac studies:   Echo: 8/22/2016   1. The left ventricular systolic function is normal with a 60-65%  estimated ejection fraction.   2. Spectral Doppler shows an impaired relaxation pattern of left ventricular diastolic filling.   3. There is an elevated mean left atrial pressure.   4. There is moderate concentric left ventricular hypertrophy.   5. There is moderately increased right ventriclar wall thickness.   6. The left atrium is moderately dilated.   7. The pulmonary artery is moderately dilated.   8. There is no evidence of a patent foramen ovale.       Stress: Not on file   Cath: Not on file       Current Facility-Administered Medications:     aztreonam (Azactam) 500 mg in dextrose 5% 50 mL IV, 500 mg, intravenous, q8h, Rodolfo Marinelli MD PhD    dextrose 10 % in water (D10W) infusion, 150 mL/hr, intravenous, Continuous, Rodolfo Marinelli MD PhD, Last Rate: 150 mL/hr at 07/21/24 2327, 150 mL/hr at 07/21/24 2327    dextrose 10 % in water (D10W) infusion, 150 mL/hr, intravenous, Continuous, Rodolfo Marinelli MD PhD    dextrose 10 % in water (D10W) infusion, 150 mL/hr, intravenous, Continuous, Rodolfo Marinelli MD PhD, Stopped at 07/22/24 0030    dextrose 50 % injection 50 mL, 50 mL, intravenous, PRN, Rodolfo Marinelli MD PhD    heparin 25,000 Units in dextrose 5% 250 mL (100 Units/mL) infusion (premix), 0-4,000 Units/hr, intravenous, Continuous, Justino Medeiros MD, Last Rate: 10 mL/hr at 07/21/24 2305, 1,000 Units/hr at 07/21/24 2305    heparin bolus from bag 2,000-4,000 Units, 2,000-4,000 Units, intravenous, q4h PRN, Justino Medeiros MD    insulin regular (HumuLIN, NovoLIN) bolus from bag 9 Units, 0.1 Units/kg, intravenous, PRN, Rodolfo Marinelli MD PhD, Stopped at 07/22/24 0445    insulin regular 100 unit/100 mL (1 unit/mL) in 0.9 % NaCl infusion, 0-50 Units/hr, intravenous, Continuous, Rodolfo Marinelli MD PhD, Stopped at 07/22/24 0445    magnesium sulfate 2 g in sterile water for injection 50 mL, 2 g, intravenous, Once, Justino Medeiros MD    metroNIDAZOLE (Flagyl) 500 mg in sodium chloride (iso)   mL, 500 mg, intravenous, q8h, Rodolfo Marinelli MD PhD, Stopped at 07/22/24 0340    norepinephrine in dextrose 5 % (Levophed) infusion  - Omnicell Override Pull, , , ,     oxygen (O2) therapy, , inhalation, Continuous - Inhalation, Courtney Wolfe MD, 50 percent at 07/21/24 2000    phenylephrine (Osmany-Synephrine) injection  - Omnicell Override Pull, , , ,     phenylephrine HCl in 0.9% NaCl syringe  - Omnicell Override Pull, , , ,     potassium chloride 40 mEq in sterile water for injection 100 mL, 40 mEq, intravenous, Once, Justino Medeiros MD, Last Rate: 25 mL/hr at 07/22/24 0232, 40 mEq at 07/22/24 0232    potassium chloride 40 mEq in sterile water for injection 100 mL, 40 mEq, intravenous, Once, Justino Medeiros MD    potassium phosphates 21 mmol in dextrose 5% 250 mL IV, 21 mmol, intravenous, Once, Justino Medeiros MD, Last Rate: 42.8 mL/hr at 07/22/24 0410, 21 mmol at 07/22/24 0410    propofol (Diprivan) bolus from bag 20 mg, 20 mg, intravenous, q10 min PRN, Courtney Wolfe MD    propofol (Diprivan) infusion, 5-50 mcg/kg/min, intravenous, Continuous, Courtney Wolfe MD, Last Rate: 11.16 mL/hr at 07/22/24 0300, 20 mcg/kg/min at 07/22/24 0300    sodium chloride 0.9% infusion, 250 mL/hr, intravenous, Continuous, Justino Medeiros MD, Last Rate: 250 mL/hr at 07/22/24 0500, 250 mL/hr at 07/22/24 0500    vancomycin (Vancocin) pharmacy to dose - pharmacy monitoring, , miscellaneous, Daily PRN, Courtney Wolfe MD    Objective:    Vitals:    07/22/24 0500   BP: 98/66   Pulse: 75   Resp: 19   Temp:    SpO2: 100%       Exam:  General - intubated sedated  Neck - no JVD   Chest -ventilator   Cardiac - S1, S2  Lower ext - Warm well perfused     Labs/Imaging:     Results from last 72 hours   Lab Units 07/22/24  0326 07/22/24  0010   TROPHSCMC ng/L  --  304*   SODIUM mmol/L 160* 160*  160*   POTASSIUM mmol/L 3.1* 3.2*  3.2*   CO2 mmol/L 30 29  29   BUN mg/dL 64* 63*  63*   CREATININE mg/dL 2.85* 2.73*  2.73*    MAGNESIUM mg/dL 1.80 1.96   PHOSPHORUS mg/dL 1.2* 1.6*      Results from last 72 hours   Lab Units 07/22/24  0010   WBC AUTO x10*3/uL 19.1*   HEMOGLOBIN g/dL 11.9*   PLATELETS AUTO x10*3/uL 145*        Assessment and Plan:   Caryl Tucker  is a 54 y/o F w/ a PMH of CVA c/b left hemiparesis and dysphagia (2016) s/p PEG placement (2016, replaced 5 times, removed unknown date), HTN, PE/DVT on Eliquis who was sent to the ED by [facility] [place] for evaluation of mental status.   She was found to have an ZULMA with hyperkalemia, hyperglycemia, glucose greater than 1400, and the CT concerning for proctitis, she was treated with antibiotics, started with insulin drip, required Levophed briefly, and was intubated for airway protection.   Around 11:52 PM, patient was noted to go into pulseless V-fib arrest, per telemetry review patient had increasing episodes of NSVT and bigeminy prior to this arrest. The patient required 3 shocks. She received epi x 3, amiodarone 300mg, calcium x 1 , HCO3 x 1 during the code. She  intermittently had ROSC in between episodes.     Impression   #Pulseless V fib arrest   #Concern for type 1 MI vs type 2   -Patient had increasing episodes of NSVT and bigeminy prior to this arrest highly suggestive of electrolyte imbalance in the setting of HHS and K 3.2 Mg 1.96. It is likely that her true potassium level was lower (6.1 on presentation)   -Bedside limited fellow TTE with overall preserved EF, no obvious WMA within the limitations of the study.   -Trop was 28 initially, 304 post arrest and CPR, this suggests that initial presentation is unlikely to have been acute plaque rupture. Trend troponin until peak.   -Was started on amiodarone gtt post arrest, suggest suspending amiodarone until K level is at least 4.5-5 as it can have QT prolonging effects in the setting of electrolyte imbalance. Once electrolytes are corrected can resume amiodarone gtt.   -Replete K and Mg and Ca per HHS  protocol  -Once patient is hemodynamically stable, can consider coronary angiogram in the future, TBD on clinical course. Patient is already on heparin gtt.   -Can load with  mg if no contraindications.   - reassurred TTE with preserved EF and no wall motion abnormalities, and EKG without overt concerning signs of ischemia.     -Cardiology will follow. Please page CICU fellow overnight [phone] with any change in clinical status or questions .       Patient discussed with Dr. Olivia Matute MD   PGY5 Cardiology Fellow   Pager [phone]

## 2024-07-22 NOTE — PROGRESS NOTES
Daily Progress Note    Caryl Tucker is a 53 y.o. female admitted on 7/21/2024 10:22 AM from Nursing home for HHS and hypercapnic RF. Nephrology consulted for hypernatremia w/ Na in the 160s.     Pt in Brief:  Caryl Tucker is a 54 y/o F w/ a PMH of CVA c/b left hemiparesis and dysphagia (s/p PEG placement 2016, replaced 5 times, removed at unknown date), HTN, PE/DVT (on Eliquis) who was brought to the ED via EMS from [facility] for evaluation of mental status. Nursing facility says patient is normally awake, alert, and able to interact with staff. This morning they found the patient hypoxic in the 80s on room air, tachycardic, hypotensive, hyperglycemic to the 400s, and unable to answer questions with drool pooling in her mouth, prompting them to call EMS.     In the ED, labs were notable for COVID+, ZULMA (BUN 80, Cr 3.57, hyperkalemia (K 6.1), hypernatremic (Na 150, corrected 171), and hyperglycemia (Glucose >1400), with concern for proctitis and large stool burden on imaging. In the ED she received 1L LR, 1 dose of Cefepime, Vancomycin, and Metronidazole, was started on an Insulin Drip, was also started on Norepinephrine drip, given 2g of calcium gluconate, and intubated.     Per Nursing home, pt just completed 7-day course of Levofloxacin, Eliquis 5mg BID, HCTZ 12.5mg, Lisinopril 20mg.    Overnight:  Code blue called at 11:52PM after pt went into V.fib arrest and received multiple cycles of compressions; received 3 shocks, epi x3, amiodarone 300mg. ROSC achieved w/ NSR and pt was following commands after this. Was started on Levo, Epi, amiodarone gtt.     Subjective   Pt was seen at the bedside and in NAD. She was awake but still intubated. Unable to fully assess mentation. Denied any sx of headache/dizziness, chest pain/tightness, abdominal pain, dysuria.     Objective   Vitals: I/O:   Vitals:    07/22/24 0800   BP:    Pulse:    Resp:    Temp: 36.3 °C (97.3 °F)   SpO2:         24hr Min/Max:  Temp   "Min: 35.9 °C (96.6 °F)  Max: 36.6 °C (97.9 °F)  Pulse  Min: 62  Max: 182  BP  Min: 49/35  Max: 206/165  Resp  Min: 16  Max: 89  SpO2  Min: 75 %  Max: 100 %   Intake/Output Summary (Last 24 hours) at 7/22/2024 0835  Last data filed at 7/22/2024 0729  Gross per 24 hour   Intake 3043.12 ml   Output 1325 ml   Net 1718.12 ml        Net IO Since Admission: 1,718.12 mL [07/22/24 0835]      PE:  General: Awake, intubated  HEENT: No scleral icterus or conjunctivitis  Skin: No suspect lesions or rashes noted on visible skin  Chest: intubated w/ b/l chest rises, rhonchi w/o crackles   Cardiac: Regular rate and rhythm, normal s1, s2, no M/R/G, no JVD  Abdomen: Soft, enlarged but ND, NT, no involuntary guarding  : Has eaton in place   EXT: Bl +2 pitting edema, no asymmetry noted  MSK: No focal joint swelling noted  Neuro: Awake, intubated. Able to track provider.     Labs:  CBC RFP   Lab Results   Component Value Date    WBC 19.1 (H) 07/22/2024    HGB 11.9 (L) 07/22/2024    HCT 40.1 07/22/2024    MCV 91 07/22/2024     (L) 07/22/2024    NEUTROABS 5.14 07/21/2024    Lab Results   Component Value Date     (HH) 07/22/2024    K 3.1 (L) 07/22/2024     (H) 07/22/2024    CO2 30 07/22/2024    BUN 64 (H) 07/22/2024    CREATININE 2.85 (H) 07/22/2024    CREATININE 2.73 (H) 07/22/2024    CREATININE 2.73 (H) 07/22/2024     Lab Results   Component Value Date    MG 1.80 07/22/2024    PHOS 1.2 (L) 07/22/2024    CALCIUM 11.5 (H) 07/22/2024         Hepatic Function ABG/VBG   Lab Results   Component Value Date    ALT 18 07/22/2024    AST 31 07/22/2024    ALKPHOS 91 07/22/2024     No results found for: \"TBILIHCVFS\", \"BILIDIR\"   Lab Results   Component Value Date    PROTIME 19.2 (H) 07/21/2024    APTT 32 07/07/2021    INR 1.7 (H) 07/21/2024    Lab Results   Component Value Date    LACTATE 8.3 (HH) 07/22/2024        Results from last 7 days   Lab Units 07/22/24  0010 07/21/24  1121   WBC AUTO x10*3/uL 19.1* 7.7   HEMOGLOBIN g/dL " 11.9* 13.8   HEMATOCRIT % 40.1 50.3*   PLATELETS AUTO x10*3/uL 145* 165   NEUTROS PCT AUTO %  --  67.2   LYMPHS PCT AUTO %  --  14.6   MONOS PCT AUTO %  --  16.6   EOS PCT AUTO %  --  0.7     Results from last 7 days   Lab Units 07/22/24  0326 07/22/24  0010 07/21/24  2203 07/21/24  1739 07/21/24  1343 07/21/24  1121   SODIUM mmol/L 160* 160*  160* 159* 154*   < > 150*   POTASSIUM mmol/L 3.1* 3.2*  3.2* 3.4* 4.8   < > 6.1*   CHLORIDE mmol/L 112* 116*  116* 112* 108*   < > 102   CO2 mmol/L 30 29  29 29 29   < > 35*   BUN mg/dL 64* 63*  63* 69* 74*   < > 80*   CREATININE mg/dL 2.85* 2.73*  2.73* 2.90* 3.24*   < > 3.57*   CALCIUM mg/dL 11.5* 9.9  9.9 10.8* 10.8*   < > 10.7*   PROTEIN TOTAL g/dL  --  6.9  --  8.0  --  8.7*   BILIRUBIN TOTAL mg/dL  --  0.9  --   --   --  0.7   ALK PHOS U/L  --  91  --   --   --  128*   ALT U/L  --  18  --   --   --  9   AST U/L  --  31  --   --   --  13   GLUCOSE mg/dL 345* 253*  253* 362* 732*   < > 1,498*    < > = values in this interval not displayed.     Results from last 7 days   Lab Units 07/22/24  0326 07/22/24  0010 07/21/24  2203   MAGNESIUM mg/dL 1.80 1.96 2.21     Results from last 7 days   Lab Units 07/22/24  0759 07/22/24  0516 07/22/24  0419 07/22/24  0326 07/22/24  0158 07/22/24  0035 07/22/24  0010 07/21/24  2210 07/21/24  2203 07/21/24  1901 07/21/24  1739 07/21/24  1438 07/21/24  1343   POCT GLUCOSE mg/dL 340* 323* 307* 323* 204*   < >  --    < >  --    < >  --    < >  --    GLUCOSE mg/dL  --   --   --  345*  --   --  253*  253*  --  362*  --  732*  --  1,444*    < > = values in this interval not displayed.     Imaging:  CT head wo IV contrast  Result Date: 7/22/2024  The examination is mildly degraded by patient motion artifact.     CSF Spaces: The ventricles, sulci and basal cisterns are within normal limits.   There is no extraaxial fluid collection.     Parenchyma: Mild patchy nonspecific white matter hypodensities compatible with microangiopathy. The  grey-white differentiation is intact. There is no mass effect or midline shift.  There is no intracranial hemorrhage.     Calvarium: The calvarium is unremarkable.     Paranasal sinuses and mastoids: Mild mucosal thickening within the paranasal sinuses. Mastoid air cells are clear.    CT chest abdomen pelvis wo IV contrast  Result Date: 7/21/2024  Chest   1. No acute process in the chest.   2. Dilated main pulmonary artery. Correlate with pulmonary arterial hypertension.     Abdomen-Pelvis   1.  Markedly distended rectum with mild thickening of the lower rectum and anorectal junction with a large stool ball and mesorectal fat stranding. Findings likely represent fecal impaction with stercoral proctitis.   2. Otherwise no acute process in the abdomen and pelvis.       Medications:  Scheduled Medications:  PRN Medications:    aztreonam (Azactam) 500 mg in dextrose 5% 50 mL IV, 500 mg, intravenous, q8h  metroNIDAZOLE, 500 mg, intravenous, q8h  norepinephrine in dextrose 5 %, , ,   oxygen, , inhalation, Continuous - Inhalation  perflutren lipid microspheres, 0.5-10 mL of dilution, intravenous, Once in imaging  perflutren protein A microsphere, 0.5 mL, intravenous, Once in imaging  phenylephrine, , ,   phenylephrine HCl in 0.9% NaCl, , ,   potassium chloride, 40 mEq, intravenous, Once  potassium phosphate, 21 mmol, intravenous, Once  sulfur hexafluoride microsphr, 2 mL, intravenous, Once in imaging     PRN medications: dextrose, heparin, insulin regular, norepinephrine in dextrose 5 %, phenylephrine, phenylephrine HCl in 0.9% NaCl, propofol, vancomycin       Assessment/Plan:   Caryl Tucker is a 53 y.o. female w/a PMH of CVA c/b left hemiparesis and dysphagia (s/p PEG placement 2016, replaced 5 times, last removed unknown date), HTN, PE/DVT (on Eliquis) who was initially brought in for evaluation of mental status change iso elevated blood glucose and RF, and is now admitted to the MICU with HHS and acute  hypoxic/hypercapnic RF, and incidentally found to be COVID+ and BCx growing GPC in clusters. She is currently on  Aztreonam, Vancomycin, Metronidazole for Abx coverage and Levo gtt for pressor support. Nephrology consulted for hypernatremia. ICU team currently giving 1/2NS and D10 glucose.        #Hypernatremia  #HHS  :: Na on intake 149, corrected to 170. Current 7/22 corrected 164.  :: Her Glucose on intake was 1,498 7/21  :: Serum Osm 378, Urine Osm 455   :: Hypovolemic on exam and likely due to HHS in addition to chronic poor intake   :: Unable to assess if hypernatremia is symptomatic given pt intubated  :: Total FWD to 164 -> 152 which gives max 12 meq correction over 24hrs (+4L), +700ccs for insensible losses, +650ccs for UoP (1/2 UoP) - Total current deficit 5,350ccs    #ZULMA, Non-oliguric, stage III  :: In May 2024 Cr was noted to be <1 from OSH records   :: Admission Cr 3.2, current 2.85  :: Has urine output >100 ml per hour  :: U/A +1 Protein and blood otherwise clear  :: No imaging to exclude obstruction  :: No current indication for dialysis  :: Given cardiac arrest 7/21-22 and successful ROSC, will likely worsen         Recommendations:  - N/S is an acceptable resuscitation solution w/ D10, but once pt is volume supported, plan to switch to D5W hypotonic IVF and correct for max 12meq/24 hrs. If over corrected, page Nephrology fellow. See sample calc based on today's labs.  - Continue to trend RFPs, check labs at least q4-6h when correcting Na  - Manage HHS as per ICU protocol  - Ensure adequate intravascular volume while avoiding overload.  - Avoid nephrotoxins, contrast if possible  - Strict Is/Os  - Renal dosing for medications for latest eGFR, follow medication trough as appropriate    - Nephrology will continue to follow for Hypernatremia and ZULMA      Patient assessment and plan discussed w/ attending Nephrologist on service, Dr. Grande.    Fritz Guzman MD  PG 2,  Internal Medicine   24 hour Renal  Pager - [phone]

## 2024-07-22 NOTE — SIGNIFICANT EVENT
Restraints indicated as alternative therapies have been attempted and have been ineffective.  Restrain with soft wrist restraints and side rails up x4 until medical devices discontinued and/or patient able to participate with plan of care.

## 2024-07-22 NOTE — CARE PLAN
Problem: Skin  Goal: Participates in plan/prevention/treatment measures  Outcome: Progressing  Goal: Prevent/manage excess moisture  Outcome: Progressing  Flowsheets (Taken 7/22/2024 0629)  Prevent/manage excess moisture: Cleanse incontinence/protect with barrier cream  Goal: Prevent/minimize sheer/friction injuries  Outcome: Progressing  Flowsheets (Taken 7/22/2024 0629)  Prevent/minimize sheer/friction injuries:   HOB 30 degrees or less   Complete micro-shifts as needed if patient unable. Adjust patient position to relieve pressure points, not a full turn   Turn/reposition every 2 hours/use positioning/transfer devices   Use pull sheet  Goal: Promote/optimize nutrition  Outcome: Progressing  Goal: Promote skin healing  Outcome: Progressing  Flowsheets (Taken 7/22/2024 0629)  Promote skin healing:   Ensure correct size (line/device) and apply per  instructions   Assess skin/pad under line(s)/device(s)   Protective dressings over bony prominences   Rotate device position/do not position patient on device   Turn/reposition every 2 hours/use positioning/transfer devices     Problem: Fall/Injury  Goal: Not fall by end of shift  Outcome: Progressing  Goal: Be free from injury by end of the shift  Outcome: Progressing  Goal: Verbalize understanding of personal risk factors for fall in the hospital  Outcome: Progressing  Goal: Verbalize understanding of risk factor reduction measures to prevent injury from fall in the home  Outcome: Progressing  Goal: Use assistive devices by end of the shift  Outcome: Progressing  Goal: Pace activities to prevent fatigue by end of the shift  Outcome: Progressing     Problem: Safety - Medical Restraint  Goal: Remains free of injury from restraints (Restraint for Interference with Medical Device)  Outcome: Progressing  Goal: Free from restraint(s) (Restraint for Interference with Medical Device)  Outcome: Progressing     Problem: Mechanical Ventilation  Goal: Patient Will  Maintain Patent Airway  Outcome: Progressing  Goal: Oral health is maintained or improved  Outcome: Progressing  Goal: ET tube will be managed safely  Outcome: Progressing  Goal: Ability to express needs and understand communication  Outcome: Progressing  Goal: Mobility/activity is maintained at optimum level for patient  Outcome: Progressing   The patient's goals for the shift include      The clinical goals for the shift include remain HDS    Over the shift, the patient did not make progress toward the following goals, of remaining hemodynamically stable. Barriers to progression include multiple v-fib arrests. Recommendations to address these barriers include continued plan of care and supportive measures.

## 2024-07-22 NOTE — CONSULTS
Vancomycin Dosing by Pharmacy- FOLLOW UP    Caryl Tucker is a 53 y.o. year old female who Pharmacy has been consulted for vancomycin dosing for other abdominal infection . Based on the patient's indication and renal status this patient is being dosed based on a goal trough/random level of 15-20.     Patient has an ZULMA.     Current vancomycin dose: Dose by levels.     Most recent random level: 15.2 mcg/mL    Visit Vitals  BP 94/67   Pulse 62   Temp 37.1 °C (98.8 °F)   Resp 15        Lab Results   Component Value Date    CREATININE 2.53 (H) 2024    CREATININE 2.59 (H) 2024    CREATININE 2.85 (H) 2024    CREATININE 2.73 (H) 2024    CREATININE 2.73 (H) 2024        Patient weight is as follows:   Vitals:    24 1708   Weight: 101 kg (221 lb 9 oz)       Cultures:  No results found for the encounter in last 14 days.       I/O last 3 completed shifts:  In: 2773.7 (27.6 mL/kg) [I.V.:1216.7 (12.1 mL/kg); IV Piggyback:1557]  Out: 1325 (13.2 mL/kg) [Urine:1325 (0.4 mL/kg/hr)]  Weight: 100.5 kg   I/O during current shift:  I/O this shift:  In: 1533.5 [I.V.:1143.5; NG/GT:140; IV Piggyback:250]  Out: 695 [Urine:695]    Temp (24hrs), Av.7 °C (98 °F), Min:35.9 °C (96.6 °F), Max:37.1 °C (98.8 °F)      Assessment/Plan    Within goal random/trough level. Will redose with vancomycin 1g x1. Continue to dose by level.   The next level will be obtained on 24 at 1500. May be obtained sooner if clinically indicated.   Will continue to monitor renal function daily while on vancomycin and order serum creatinine at least every 48 hours if not already ordered.  Follow for continued vancomycin needs, clinical response, and signs/symptoms of toxicity.       Nadia Etienne, PharmD

## 2024-07-23 ENCOUNTER — APPOINTMENT (OUTPATIENT)
Dept: CARDIOLOGY | Facility: HOSPITAL | Age: 54
End: 2024-07-23
Payer: MEDICARE

## 2024-07-23 ENCOUNTER — APPOINTMENT (OUTPATIENT)
Dept: RADIOLOGY | Facility: HOSPITAL | Age: 54
DRG: 208 | End: 2024-07-23
Payer: MEDICARE

## 2024-07-23 LAB
ABO GROUP (TYPE) IN BLOOD: NORMAL
ALBUMIN SERPL BCP-MCNC: 2.8 G/DL (ref 3.4–5)
ALBUMIN SERPL BCP-MCNC: 2.9 G/DL (ref 3.4–5)
ALBUMIN SERPL BCP-MCNC: 2.9 G/DL (ref 3.4–5)
ANION GAP BLDV CALCULATED.4IONS-SCNC: 12 MMOL/L (ref 10–25)
ANION GAP BLDV CALCULATED.4IONS-SCNC: 15 MMOL/L (ref 10–25)
ANION GAP BLDV CALCULATED.4IONS-SCNC: 7 MMOL/L (ref 10–25)
ANION GAP BLDV CALCULATED.4IONS-SCNC: 8 MMOL/L (ref 10–25)
ANION GAP BLDV CALCULATED.4IONS-SCNC: 8 MMOL/L (ref 10–25)
ANION GAP BLDV CALCULATED.4IONS-SCNC: 9 MMOL/L (ref 10–25)
ANION GAP SERPL CALC-SCNC: 12 MMOL/L (ref 10–20)
ANION GAP SERPL CALC-SCNC: 13 MMOL/L (ref 10–20)
ANION GAP SERPL CALC-SCNC: 14 MMOL/L (ref 10–20)
ANION GAP SERPL CALC-SCNC: 14 MMOL/L (ref 10–20)
ANION GAP SERPL CALC-SCNC: 16 MMOL/L (ref 10–20)
ANTIBODY SCREEN: NORMAL
ATRIAL RATE: 67 BPM
ATRIAL RATE: 88 BPM
BASE EXCESS BLDV CALC-SCNC: 1.9 MMOL/L (ref -2–3)
BASE EXCESS BLDV CALC-SCNC: 2.4 MMOL/L (ref -2–3)
BASE EXCESS BLDV CALC-SCNC: 2.6 MMOL/L (ref -2–3)
BASE EXCESS BLDV CALC-SCNC: 3.7 MMOL/L (ref -2–3)
BASE EXCESS BLDV CALC-SCNC: 4.4 MMOL/L (ref -2–3)
BASE EXCESS BLDV CALC-SCNC: 5.4 MMOL/L (ref -2–3)
BODY TEMPERATURE: 37 DEGREES CELSIUS
BUN SERPL-MCNC: 37 MG/DL (ref 6–23)
BUN SERPL-MCNC: 39 MG/DL (ref 6–23)
BUN SERPL-MCNC: 40 MG/DL (ref 6–23)
BUN SERPL-MCNC: 42 MG/DL (ref 6–23)
BUN SERPL-MCNC: 42 MG/DL (ref 6–23)
CA-I BLDV-SCNC: 0.82 MMOL/L (ref 1.1–1.33)
CA-I BLDV-SCNC: 1.21 MMOL/L (ref 1.1–1.33)
CA-I BLDV-SCNC: 1.26 MMOL/L (ref 1.1–1.33)
CA-I BLDV-SCNC: 1.27 MMOL/L (ref 1.1–1.33)
CA-I BLDV-SCNC: 1.28 MMOL/L (ref 1.1–1.33)
CA-I BLDV-SCNC: 1.29 MMOL/L (ref 1.1–1.33)
CALCIUM SERPL-MCNC: 8.5 MG/DL (ref 8.6–10.6)
CALCIUM SERPL-MCNC: 8.6 MG/DL (ref 8.6–10.6)
CALCIUM SERPL-MCNC: 8.9 MG/DL (ref 8.6–10.6)
CALCIUM SERPL-MCNC: 9.4 MG/DL (ref 8.6–10.6)
CALCIUM SERPL-MCNC: 9.7 MG/DL (ref 8.6–10.6)
CARDIAC TROPONIN I PNL SERPL HS: 709 NG/L (ref 0–34)
CHLORIDE BLDV-SCNC: 110 MMOL/L (ref 98–107)
CHLORIDE BLDV-SCNC: 111 MMOL/L (ref 98–107)
CHLORIDE BLDV-SCNC: 111 MMOL/L (ref 98–107)
CHLORIDE BLDV-SCNC: 112 MMOL/L (ref 98–107)
CHLORIDE BLDV-SCNC: 112 MMOL/L (ref 98–107)
CHLORIDE BLDV-SCNC: 113 MMOL/L (ref 98–107)
CHLORIDE SERPL-SCNC: 112 MMOL/L (ref 98–107)
CO2 SERPL-SCNC: 28 MMOL/L (ref 21–32)
CO2 SERPL-SCNC: 29 MMOL/L (ref 21–32)
CO2 SERPL-SCNC: 30 MMOL/L (ref 21–32)
CREAT SERPL-MCNC: 1.95 MG/DL (ref 0.5–1.05)
CREAT SERPL-MCNC: 1.97 MG/DL (ref 0.5–1.05)
CREAT SERPL-MCNC: 1.98 MG/DL (ref 0.5–1.05)
CREAT SERPL-MCNC: 2.12 MG/DL (ref 0.5–1.05)
CREAT SERPL-MCNC: 2.17 MG/DL (ref 0.5–1.05)
EGFRCR SERPLBLD CKD-EPI 2021: 26 ML/MIN/1.73M*2
EGFRCR SERPLBLD CKD-EPI 2021: 27 ML/MIN/1.73M*2
EGFRCR SERPLBLD CKD-EPI 2021: 29 ML/MIN/1.73M*2
EGFRCR SERPLBLD CKD-EPI 2021: 30 ML/MIN/1.73M*2
EGFRCR SERPLBLD CKD-EPI 2021: 30 ML/MIN/1.73M*2
ERYTHROCYTE [DISTWIDTH] IN BLOOD BY AUTOMATED COUNT: 17.6 % (ref 11.5–14.5)
ERYTHROCYTE [DISTWIDTH] IN BLOOD BY AUTOMATED COUNT: 17.6 % (ref 11.5–14.5)
ERYTHROCYTE [DISTWIDTH] IN BLOOD BY AUTOMATED COUNT: 17.7 % (ref 11.5–14.5)
ERYTHROCYTE [DISTWIDTH] IN BLOOD BY AUTOMATED COUNT: 17.7 % (ref 11.5–14.5)
GLUCOSE BLD MANUAL STRIP-MCNC: 165 MG/DL (ref 74–99)
GLUCOSE BLD MANUAL STRIP-MCNC: 170 MG/DL (ref 74–99)
GLUCOSE BLD MANUAL STRIP-MCNC: 218 MG/DL (ref 74–99)
GLUCOSE BLD MANUAL STRIP-MCNC: 229 MG/DL (ref 74–99)
GLUCOSE BLD MANUAL STRIP-MCNC: 250 MG/DL (ref 74–99)
GLUCOSE BLD MANUAL STRIP-MCNC: 253 MG/DL (ref 74–99)
GLUCOSE BLD MANUAL STRIP-MCNC: 258 MG/DL (ref 74–99)
GLUCOSE BLD MANUAL STRIP-MCNC: 263 MG/DL (ref 74–99)
GLUCOSE BLD MANUAL STRIP-MCNC: 278 MG/DL (ref 74–99)
GLUCOSE BLD MANUAL STRIP-MCNC: 303 MG/DL (ref 74–99)
GLUCOSE BLD MANUAL STRIP-MCNC: 305 MG/DL (ref 74–99)
GLUCOSE BLD MANUAL STRIP-MCNC: 313 MG/DL (ref 74–99)
GLUCOSE BLD MANUAL STRIP-MCNC: 315 MG/DL (ref 74–99)
GLUCOSE BLD MANUAL STRIP-MCNC: 321 MG/DL (ref 74–99)
GLUCOSE BLD MANUAL STRIP-MCNC: 322 MG/DL (ref 74–99)
GLUCOSE BLD MANUAL STRIP-MCNC: 323 MG/DL (ref 74–99)
GLUCOSE BLD MANUAL STRIP-MCNC: 325 MG/DL (ref 74–99)
GLUCOSE BLD MANUAL STRIP-MCNC: 328 MG/DL (ref 74–99)
GLUCOSE BLD MANUAL STRIP-MCNC: 334 MG/DL (ref 74–99)
GLUCOSE BLD MANUAL STRIP-MCNC: 340 MG/DL (ref 74–99)
GLUCOSE BLD MANUAL STRIP-MCNC: 340 MG/DL (ref 74–99)
GLUCOSE BLD MANUAL STRIP-MCNC: 364 MG/DL (ref 74–99)
GLUCOSE BLDV-MCNC: 190 MG/DL (ref 74–99)
GLUCOSE BLDV-MCNC: 298 MG/DL (ref 74–99)
GLUCOSE BLDV-MCNC: 382 MG/DL (ref 74–99)
GLUCOSE BLDV-MCNC: 385 MG/DL (ref 74–99)
GLUCOSE BLDV-MCNC: 387 MG/DL (ref 74–99)
GLUCOSE BLDV-MCNC: 423 MG/DL (ref 74–99)
GLUCOSE SERPL-MCNC: 287 MG/DL (ref 74–99)
GLUCOSE SERPL-MCNC: 334 MG/DL (ref 74–99)
GLUCOSE SERPL-MCNC: 359 MG/DL (ref 74–99)
GLUCOSE SERPL-MCNC: 360 MG/DL (ref 74–99)
GLUCOSE SERPL-MCNC: 365 MG/DL (ref 74–99)
HCO3 BLDV-SCNC: 27.7 MMOL/L (ref 22–26)
HCO3 BLDV-SCNC: 29 MMOL/L (ref 22–26)
HCO3 BLDV-SCNC: 29 MMOL/L (ref 22–26)
HCO3 BLDV-SCNC: 29.1 MMOL/L (ref 22–26)
HCO3 BLDV-SCNC: 29.8 MMOL/L (ref 22–26)
HCO3 BLDV-SCNC: 29.9 MMOL/L (ref 22–26)
HCT VFR BLD AUTO: 36.4 % (ref 36–46)
HCT VFR BLD AUTO: 37 % (ref 36–46)
HCT VFR BLD AUTO: 37.8 % (ref 36–46)
HCT VFR BLD AUTO: 37.9 % (ref 36–46)
HCT VFR BLD EST: 35 % (ref 36–46)
HCT VFR BLD EST: 35 % (ref 36–46)
HCT VFR BLD EST: 36 % (ref 36–46)
HCT VFR BLD EST: 37 % (ref 36–46)
HCT VFR BLD EST: 38 % (ref 36–46)
HCT VFR BLD EST: 39 % (ref 36–46)
HGB BLD-MCNC: 11.3 G/DL (ref 12–16)
HGB BLD-MCNC: 11.3 G/DL (ref 12–16)
HGB BLD-MCNC: 11.6 G/DL (ref 12–16)
HGB BLD-MCNC: 11.8 G/DL (ref 12–16)
HGB BLDV-MCNC: 11.6 G/DL (ref 12–16)
HGB BLDV-MCNC: 11.8 G/DL (ref 12–16)
HGB BLDV-MCNC: 12 G/DL (ref 12–16)
HGB BLDV-MCNC: 12.4 G/DL (ref 12–16)
HGB BLDV-MCNC: 12.8 G/DL (ref 12–16)
HGB BLDV-MCNC: 12.9 G/DL (ref 12–16)
INHALED O2 CONCENTRATION: 24 %
INHALED O2 CONCENTRATION: 26 %
INHALED O2 CONCENTRATION: 28 %
INHALED O2 CONCENTRATION: 30 %
INHALED O2 CONCENTRATION: 40 %
INHALED O2 CONCENTRATION: 40 %
LACTATE BLDV-SCNC: 1.3 MMOL/L (ref 0.4–2)
LACTATE BLDV-SCNC: 1.4 MMOL/L (ref 0.4–2)
LACTATE BLDV-SCNC: 1.7 MMOL/L (ref 0.4–2)
LACTATE BLDV-SCNC: 1.8 MMOL/L (ref 0.4–2)
LACTATE BLDV-SCNC: 1.8 MMOL/L (ref 0.4–2)
LACTATE BLDV-SCNC: 1.9 MMOL/L (ref 0.4–2)
MAGNESIUM SERPL-MCNC: 2.15 MG/DL (ref 1.6–2.4)
MAGNESIUM SERPL-MCNC: 2.16 MG/DL (ref 1.6–2.4)
MAGNESIUM SERPL-MCNC: 2.2 MG/DL (ref 1.6–2.4)
MAGNESIUM SERPL-MCNC: 2.21 MG/DL (ref 1.6–2.4)
MAGNESIUM SERPL-MCNC: 2.27 MG/DL (ref 1.6–2.4)
MCH RBC QN AUTO: 26.8 PG (ref 26–34)
MCH RBC QN AUTO: 26.8 PG (ref 26–34)
MCH RBC QN AUTO: 27.1 PG (ref 26–34)
MCH RBC QN AUTO: 27.1 PG (ref 26–34)
MCHC RBC AUTO-ENTMCNC: 30.5 G/DL (ref 32–36)
MCHC RBC AUTO-ENTMCNC: 30.7 G/DL (ref 32–36)
MCHC RBC AUTO-ENTMCNC: 31 G/DL (ref 32–36)
MCHC RBC AUTO-ENTMCNC: 31.1 G/DL (ref 32–36)
MCV RBC AUTO: 86 FL (ref 80–100)
MCV RBC AUTO: 87 FL (ref 80–100)
MCV RBC AUTO: 87 FL (ref 80–100)
MCV RBC AUTO: 89 FL (ref 80–100)
MRSA DNA SPEC QL NAA+PROBE: NOT DETECTED
NRBC BLD-RTO: 0 /100 WBCS (ref 0–0)
NRBC BLD-RTO: 0.2 /100 WBCS (ref 0–0)
NRBC BLD-RTO: 0.3 /100 WBCS (ref 0–0)
NRBC BLD-RTO: 0.4 /100 WBCS (ref 0–0)
OSMOLALITY SERPL: 330 MOSM/KG (ref 280–300)
OSMOLALITY SERPL: 336 MOSM/KG (ref 280–300)
OSMOLALITY SERPL: 338 MOSM/KG (ref 280–300)
OSMOLALITY SERPL: 342 MOSM/KG (ref 280–300)
OSMOLALITY SERPL: 344 MOSM/KG (ref 280–300)
OXYHGB MFR BLDV: 64.4 % (ref 45–75)
OXYHGB MFR BLDV: 67.1 % (ref 45–75)
OXYHGB MFR BLDV: 71 % (ref 45–75)
OXYHGB MFR BLDV: 73 % (ref 45–75)
OXYHGB MFR BLDV: 76 % (ref 45–75)
OXYHGB MFR BLDV: 82.5 % (ref 45–75)
P AXIS: 71 DEGREES
P OFFSET: 211 MS
P ONSET: 150 MS
PCO2 BLDV: 38 MM HG (ref 41–51)
PCO2 BLDV: 39 MM HG (ref 41–51)
PCO2 BLDV: 47 MM HG (ref 41–51)
PCO2 BLDV: 54 MM HG (ref 41–51)
PCO2 BLDV: 55 MM HG (ref 41–51)
PCO2 BLDV: 58 MM HG (ref 41–51)
PH BLDV: 7.32 PH (ref 7.33–7.43)
PH BLDV: 7.33 PH (ref 7.33–7.43)
PH BLDV: 7.34 PH (ref 7.33–7.43)
PH BLDV: 7.41 PH (ref 7.33–7.43)
PH BLDV: 7.46 PH (ref 7.33–7.43)
PH BLDV: 7.49 PH (ref 7.33–7.43)
PHOSPHATE SERPL-MCNC: 2.9 MG/DL (ref 2.5–4.9)
PHOSPHATE SERPL-MCNC: 2.9 MG/DL (ref 2.5–4.9)
PHOSPHATE SERPL-MCNC: 3.1 MG/DL (ref 2.5–4.9)
PHOSPHATE SERPL-MCNC: 4.3 MG/DL (ref 2.5–4.9)
PHOSPHATE SERPL-MCNC: 4.4 MG/DL (ref 2.5–4.9)
PLATELET # BLD AUTO: 103 X10*3/UL (ref 150–450)
PLATELET # BLD AUTO: 105 X10*3/UL (ref 150–450)
PLATELET # BLD AUTO: 112 X10*3/UL (ref 150–450)
PLATELET # BLD AUTO: 115 X10*3/UL (ref 150–450)
PO2 BLDV: 38 MM HG (ref 35–45)
PO2 BLDV: 39 MM HG (ref 35–45)
PO2 BLDV: 41 MM HG (ref 35–45)
PO2 BLDV: 43 MM HG (ref 35–45)
PO2 BLDV: 48 MM HG (ref 35–45)
PO2 BLDV: 56 MM HG (ref 35–45)
POTASSIUM BLDV-SCNC: 3.6 MMOL/L (ref 3.5–5.3)
POTASSIUM BLDV-SCNC: 4.1 MMOL/L (ref 3.5–5.3)
POTASSIUM BLDV-SCNC: 4.1 MMOL/L (ref 3.5–5.3)
POTASSIUM BLDV-SCNC: 4.3 MMOL/L (ref 3.5–5.3)
POTASSIUM BLDV-SCNC: 4.6 MMOL/L (ref 3.5–5.3)
POTASSIUM BLDV-SCNC: 4.7 MMOL/L (ref 3.5–5.3)
POTASSIUM SERPL-SCNC: 4.1 MMOL/L (ref 3.5–5.3)
POTASSIUM SERPL-SCNC: 4.1 MMOL/L (ref 3.5–5.3)
POTASSIUM SERPL-SCNC: 4.2 MMOL/L (ref 3.5–5.3)
POTASSIUM SERPL-SCNC: 4.4 MMOL/L (ref 3.5–5.3)
POTASSIUM SERPL-SCNC: 4.5 MMOL/L (ref 3.5–5.3)
PR INTERVAL: 158 MS
Q ONSET: 229 MS
Q ONSET: 232 MS
QRS COUNT: 12 BEATS
QRS COUNT: 14 BEATS
QRS DURATION: 112 MS
QRS DURATION: 118 MS
QT INTERVAL: 378 MS
QT INTERVAL: 418 MS
QTC CALCULATION(BAZETT): 457 MS
QTC CALCULATION(BAZETT): 463 MS
QTC FREDERICIA: 429 MS
QTC FREDERICIA: 448 MS
R AXIS: 20 DEGREES
R AXIS: 32 DEGREES
RBC # BLD AUTO: 4.17 X10*6/UL (ref 4–5.2)
RBC # BLD AUTO: 4.22 X10*6/UL (ref 4–5.2)
RBC # BLD AUTO: 4.33 X10*6/UL (ref 4–5.2)
RBC # BLD AUTO: 4.36 X10*6/UL (ref 4–5.2)
RH FACTOR (ANTIGEN D): NORMAL
SAO2 % BLDV: 66 % (ref 45–75)
SAO2 % BLDV: 69 % (ref 45–75)
SAO2 % BLDV: 73 % (ref 45–75)
SAO2 % BLDV: 75 % (ref 45–75)
SAO2 % BLDV: 78 % (ref 45–75)
SAO2 % BLDV: 85 % (ref 45–75)
SODIUM BLDV-SCNC: 144 MMOL/L (ref 136–145)
SODIUM BLDV-SCNC: 145 MMOL/L (ref 136–145)
SODIUM BLDV-SCNC: 145 MMOL/L (ref 136–145)
SODIUM BLDV-SCNC: 146 MMOL/L (ref 136–145)
SODIUM BLDV-SCNC: 148 MMOL/L (ref 136–145)
SODIUM BLDV-SCNC: 149 MMOL/L (ref 136–145)
SODIUM SERPL-SCNC: 150 MMOL/L (ref 136–145)
SODIUM SERPL-SCNC: 151 MMOL/L (ref 136–145)
T AXIS: 52 DEGREES
T AXIS: 55 DEGREES
T OFFSET: 418 MS
T OFFSET: 441 MS
UFH PPP CHRO-ACNC: 1 IU/ML
VANCOMYCIN SERPL-MCNC: 15.6 UG/ML (ref 5–20)
VENTRICULAR RATE: 74 BPM
VENTRICULAR RATE: 88 BPM
WBC # BLD AUTO: 7.8 X10*3/UL (ref 4.4–11.3)
WBC # BLD AUTO: 8 X10*3/UL (ref 4.4–11.3)
WBC # BLD AUTO: 8.4 X10*3/UL (ref 4.4–11.3)
WBC # BLD AUTO: 8.4 X10*3/UL (ref 4.4–11.3)

## 2024-07-23 PROCEDURE — 85027 COMPLETE CBC AUTOMATED: CPT

## 2024-07-23 PROCEDURE — 86901 BLOOD TYPING SEROLOGIC RH(D): CPT

## 2024-07-23 PROCEDURE — 2500000001 HC RX 250 WO HCPCS SELF ADMINISTERED DRUGS (ALT 637 FOR MEDICARE OP)

## 2024-07-23 PROCEDURE — 83735 ASSAY OF MAGNESIUM: CPT

## 2024-07-23 PROCEDURE — 85520 HEPARIN ASSAY: CPT

## 2024-07-23 PROCEDURE — 94003 VENT MGMT INPAT SUBQ DAY: CPT

## 2024-07-23 PROCEDURE — 99233 SBSQ HOSP IP/OBS HIGH 50: CPT

## 2024-07-23 PROCEDURE — 84132 ASSAY OF SERUM POTASSIUM: CPT

## 2024-07-23 PROCEDURE — 2500000004 HC RX 250 GENERAL PHARMACY W/ HCPCS (ALT 636 FOR OP/ED)

## 2024-07-23 PROCEDURE — 99291 CRITICAL CARE FIRST HOUR: CPT | Performed by: INTERNAL MEDICINE

## 2024-07-23 PROCEDURE — 82947 ASSAY GLUCOSE BLOOD QUANT: CPT

## 2024-07-23 PROCEDURE — 2020000001 HC ICU ROOM DAILY

## 2024-07-23 PROCEDURE — 92610 EVALUATE SWALLOWING FUNCTION: CPT | Mod: GN

## 2024-07-23 PROCEDURE — 93005 ELECTROCARDIOGRAM TRACING: CPT

## 2024-07-23 PROCEDURE — 2500000005 HC RX 250 GENERAL PHARMACY W/O HCPCS

## 2024-07-23 PROCEDURE — 74018 RADEX ABDOMEN 1 VIEW: CPT

## 2024-07-23 PROCEDURE — 74018 RADEX ABDOMEN 1 VIEW: CPT | Performed by: RADIOLOGY

## 2024-07-23 PROCEDURE — 2500000002 HC RX 250 W HCPCS SELF ADMINISTERED DRUGS (ALT 637 FOR MEDICARE OP, ALT 636 FOR OP/ED)

## 2024-07-23 PROCEDURE — 99291 CRITICAL CARE FIRST HOUR: CPT

## 2024-07-23 PROCEDURE — 83930 ASSAY OF BLOOD OSMOLALITY: CPT

## 2024-07-23 PROCEDURE — 87641 MR-STAPH DNA AMP PROBE: CPT

## 2024-07-23 PROCEDURE — 37799 UNLISTED PX VASCULAR SURGERY: CPT

## 2024-07-23 PROCEDURE — 80202 ASSAY OF VANCOMYCIN: CPT

## 2024-07-23 RX ORDER — ATORVASTATIN CALCIUM 40 MG/1
40 TABLET, FILM COATED ORAL NIGHTLY
Status: DISCONTINUED | OUTPATIENT
Start: 2024-07-23 | End: 2024-08-06 | Stop reason: HOSPADM

## 2024-07-23 RX ORDER — SENNOSIDES 8.8 MG/5ML
5 LIQUID ORAL NIGHTLY
Status: DISCONTINUED | OUTPATIENT
Start: 2024-07-23 | End: 2024-08-06 | Stop reason: HOSPADM

## 2024-07-23 RX ORDER — HEPARIN SODIUM 10000 [USP'U]/100ML
0-4000 INJECTION, SOLUTION INTRAVENOUS CONTINUOUS
Status: DISPENSED | OUTPATIENT
Start: 2024-07-23 | End: 2024-08-06

## 2024-07-23 RX ORDER — INSULIN LISPRO 100 [IU]/ML
0-10 INJECTION, SOLUTION INTRAVENOUS; SUBCUTANEOUS
Status: DISCONTINUED | OUTPATIENT
Start: 2024-07-23 | End: 2024-07-24

## 2024-07-23 RX ORDER — NAPROXEN SODIUM 220 MG/1
81 TABLET, FILM COATED ORAL DAILY
Status: DISCONTINUED | OUTPATIENT
Start: 2024-07-23 | End: 2024-08-06 | Stop reason: HOSPADM

## 2024-07-23 RX ORDER — INSULIN GLARGINE 100 [IU]/ML
10 INJECTION, SOLUTION SUBCUTANEOUS NIGHTLY
Status: DISCONTINUED | OUTPATIENT
Start: 2024-07-23 | End: 2024-07-26

## 2024-07-23 RX ORDER — INSULIN GLARGINE 100 [IU]/ML
10 INJECTION, SOLUTION SUBCUTANEOUS NIGHTLY
Status: DISCONTINUED | OUTPATIENT
Start: 2024-07-23 | End: 2024-07-23

## 2024-07-23 ASSESSMENT — PAIN SCALES - GENERAL
PAINLEVEL_OUTOF10: 0 - NO PAIN

## 2024-07-23 ASSESSMENT — PAIN - FUNCTIONAL ASSESSMENT
PAIN_FUNCTIONAL_ASSESSMENT: 0-10
PAIN_FUNCTIONAL_ASSESSMENT: CPOT (CRITICAL CARE PAIN OBSERVATION TOOL)

## 2024-07-23 NOTE — PROGRESS NOTES
Subjective Data:  Patient extubated and is tolerating nasal cannula     Overnight Events:    Still requiring norepi support, 0.04 this afternoon        Objective Data:  Last Recorded Vitals:  Vitals:    07/23/24 0729 07/23/24 0800 07/23/24 0900 07/23/24 1000   BP:  82/55 83/56 94/56   Pulse:  66 66 66   Resp:  10 11 19   Temp:  37.1 °C (98.8 °F) 37 °C (98.6 °F) 37 °C (98.6 °F)   TempSrc:       SpO2: 98% 100% 100% 100%   Weight:       Height:           Last Labs:  CBC - 7/23/2024: 11:54 AM  8.0 11.3 105    36.4      CMP - 7/23/2024: 11:54 AM  8.6 6.9 31 --- 0.9   4.3 2.8 18 91      PTT - No results in last year.  1.7   19.2 _     TROPHS   Date/Time Value Ref Range Status   07/22/2024 08:31  0 - 34 ng/L Final   07/22/2024 04:12  0 - 34 ng/L Final     Comment:     Previous result verified on 7/22/2024 0352 on specimen/case [id number] called with component [facility] for procedure Troponin I, High Sensitivity with value 304 ng/L.   07/22/2024 12:10  0 - 34 ng/L Final     BNP   Date/Time Value Ref Range Status   07/21/2024 11:21 AM 6 0 - 99 pg/mL Final     HGBA1C   Date/Time Value Ref Range Status   07/21/2024 11:21 AM 12.1 see below % Final      Last I/O:  I/O last 3 completed shifts:  In: 6862.6 (65.7 mL/kg) [I.V.:4090.6 (39.2 mL/kg); NG/GT:260; IV Piggyback:2512]  Out: 2670 (25.6 mL/kg) [Urine:2670 (0.7 mL/kg/hr)]  Weight: 104.4 kg     Past Cardiology Tests (Last 3 Years):  EKG:  Electrocardiogram, 12-lead PRN ACS symptoms 07/22/2024 (Preliminary)      Electrocardiogram, 12-lead PRN ACS symptoms 07/22/2024 (Preliminary)      ECG 12 lead 07/21/2024    Echo:  Transthoracic Echo (TTE) Complete 07/22/2024      Transthoracic Echo (TTE) Limited 07/22/2024  ONCLUSIONS:   1. The left ventricular systolic function is normal, with a visually estimated ejection fraction of 70-75%.   2. Spectral Doppler shows an impaired relaxation pattern of left ventricular diastolic filling.   3. There is no evidence of left  ventricular hypertrophy.   4. There is normal right ventricular global systolic function.          Inpatient Medications:  Scheduled medications   Medication Dose Route Frequency    aztreonam (Azactam) 500 mg in dextrose 5% 50 mL IV  500 mg intravenous q8h    heparin (porcine)  5,000 Units subcutaneous q8h    metroNIDAZOLE  500 mg intravenous q8h    oxygen   inhalation Continuous - Inhalation    perflutren lipid microspheres  0.5-10 mL of dilution intravenous Once in imaging    perflutren protein A microsphere  0.5 mL intravenous Once in imaging    senna  5 mL oral Nightly    sulfur hexafluoride microsphr  2 mL intravenous Once in imaging    vancomycin  1,250 mg intravenous Once     PRN medications   Medication    dextrose    insulin regular    vancomycin     Continuous Medications   Medication Dose Last Rate    insulin regular  0-50 Units/hr 2 Units/hr (07/23/24 1100)    norepinephrine  0-0.5 mcg/kg/min 0.04 mcg/kg/min (07/23/24 0818)    sodium chloride  100 mL/hr 100 mL/hr (07/23/24 1110)       Physical Exam:  General: resting in bed   HEENT: no JVD, no scleral icterus   CV: RRR, S1/S2    Lungs: on nasal cannula  Abdomen: soft non tender   Extremities: no swelling        Assessment/Plan   Caryl Tucker  is a 54 y/o F w/ a PMH of CVA c/b left hemiparesis and dysphagia (2016) s/p PEG placement (2016, replaced 5 times, removed unknown date), HTN, PE/DVT on Eliquis who was sent to the ED by [facility] for evaluation of mental status.   She was found to have an ZULMA with hyperkalemia, hyperglycemia, glucose greater than 1400, and the CT concerning for proctitis, she was treated with antibiotics, started with insulin drip, required Levophed briefly, and was intubated for airway protection.   Around 11:52 PM, patient was noted to go into pulseless V-fib arrest, per telemetry review patient had increasing episodes of NSVT and bigeminy prior to this arrest. The patient required 3 shocks. She received epi x 3,  amiodarone 300mg, calcium x 1 , HCO3 x 1 during the code. She  intermittently had ROSC in between episodes.      Impression   #Pulseless V fib arrest   #Concern for type 1 MI vs type 2   -Patient had increasing episodes of NSVT and bigeminy prior to this arrest highly suggestive of electrolyte imbalance in the setting of HHS and K 3.2 Mg 1.96. It is likely that her true potassium level was lower (6.1 on presentation)   - troponin peaked at 955, and formal TTE preserved EF with no concerning wall motion abnormalities   - given this less likely ischemic event, though cannot rule out ischemic cause completely   Recommendations   - will need angiogram prior to discharge   - would not start amio as underlying electrolyte abnormalities are being corrected   - heparin gtt for total 48 hours from event, start asa 81 mg daily, high dose statin         -Cardiology will follow. Please page CICU fellow overnight [phone] with any change in clinical status or questions .     Code Status:  Full Code    I spent 30 minutes in the professional and overall care of this patient.      Yvette Matute MD   PGY5 Cardiology Fellow   Pager [phone]

## 2024-07-23 NOTE — CARE PLAN
The patient's goals for the shift include  Patient  will maintain O2 stats while extubated.    The clinical goals for the shift include Pt will remain safe during shift      Problem: Skin  Goal: Participates in plan/prevention/treatment measures  Outcome: Progressing  Goal: Prevent/manage excess moisture  Outcome: Progressing  Goal: Prevent/minimize sheer/friction injuries  Outcome: Progressing  Goal: Promote/optimize nutrition  Outcome: Progressing  Goal: Promote skin healing  Outcome: Progressing     Problem: Fall/Injury  Goal: Not fall by end of shift  Outcome: Progressing  Goal: Be free from injury by end of the shift  Outcome: Progressing  Goal: Verbalize understanding of personal risk factors for fall in the hospital  Outcome: Progressing  Goal: Verbalize understanding of risk factor reduction measures to prevent injury from fall in the home  Outcome: Progressing  Goal: Use assistive devices by end of the shift  Outcome: Progressing  Goal: Pace activities to prevent fatigue by end of the shift  Outcome: Progressing     Problem: Pain - Adult  Goal: Verbalizes/displays adequate comfort level or baseline comfort level  Outcome: Progressing     Problem: Safety - Adult  Goal: Free from fall injury  Outcome: Progressing     Problem: Discharge Planning  Goal: Discharge to home or other facility with appropriate resources  Outcome: Progressing     Problem: Chronic Conditions and Co-morbidities  Goal: Patient's chronic conditions and co-morbidity symptoms are monitored and maintained or improved  Outcome: Progressing     Problem: Diabetes  Goal: Achieve decreasing blood glucose levels by end of shift  Outcome: Progressing  Goal: Increase stability of blood glucose readings by end of shift  Outcome: Progressing  Goal: Decrease in ketones present in urine by end of shift  Outcome: Progressing  Goal: Maintain electrolyte levels within acceptable range throughout shift  Outcome: Progressing  Goal: Maintain glucose levels  >70mg/dl to <250mg/dl throughout shift  Outcome: Progressing  Goal: No changes in neurological exam by end of shift  Outcome: Progressing  Goal: Learn about and adhere to nutrition recommendations by end of shift  Outcome: Progressing  Goal: Vital signs within normal range for age by end of shift  Outcome: Progressing  Goal: Increase self care and/or family involovement by end of shift  Outcome: Progressing  Goal: Receive DSME education by end of shift  Outcome: Progressing

## 2024-07-23 NOTE — CARE PLAN
Problem: Skin  Goal: Participates in plan/prevention/treatment measures  Flowsheets (Taken 7/23/2024 0544)  Participates in plan/prevention/treatment measures: Elevate heels  Goal: Prevent/minimize sheer/friction injuries  Flowsheets (Taken 7/23/2024 0544)  Prevent/minimize sheer/friction injuries:   HOB 30 degrees or less   Complete micro-shifts as needed if patient unable. Adjust patient position to relieve pressure points, not a full turn   Turn/reposition every 2 hours/use positioning/transfer devices  Goal: Promote skin healing  Flowsheets (Taken 7/23/2024 0544)  Promote skin healing:   Assess skin/pad under line(s)/device(s)   Protective dressings over bony prominences   Rotate device position/do not position patient on device   Turn/reposition every 2 hours/use positioning/transfer devices   The patient's goals for the shift include      The clinical goals for the shift include Pt will remain safe during shift

## 2024-07-23 NOTE — PROGRESS NOTES
Speech-Language Pathology  Adult Inpatient Clinical Bedside Swallow Evaluation    Patient Name: Caryl Tucker  MRN: [id number]  Today's Date: 7/23/2024   Start Time: 1510  Stop Time: 1530  Time Calculation (min): 20    History of Present Illness:   Caryl Tucker is a 53 y.o. female with a past medical hx of CVA c/b left hemiparesis and dysphagia (2016) s/p PEG placement (removed unknown date), HTN, PE/DVT on Eliquis. Likely chronic respiratory acidosis. She was admitted to the MICU with HHS and Type II respiratory failure. Nephrology consulted for hypernatremia.     Assessment:   Clinical bedside swallow evaluation completed. Pt cleared for evaluation by RN. Awake but drowsy for session and placed upright in bed. A&O to self and place w/ Fo2. Oral mechanism examination revealed L lingual deviation and loss of secretions on L side of oral cavity. Additionally w/ wet/gurgly upper airway sounds prior to administration of PO trials; suspect r/t difficulty managing oropharyngeal secretions. Reduced speech intelligibility 2/2 imprecise articulation. Unable to provide details re: h/o dysphagia but reports eating by mouth prior to administration. Weak congested cough prior to administration of PO trials.     Trials of ice chip x1 and tsp sip water x1 were given. Prolonged manipulation/holding w/ both trials. Anterior loss of tsp sip water trial. Significant overt clinical s/s aspiration w/ both trials characterized by immediate/prolonged wet/congested coughing; required ~1 min to recover. No further trials given 2/2 c/f aspiration.     Pt not appropriate for initiation of PO diet or instrumental swallow assessment given severity of swallow function. Recommend STRICT NPO w/ frequent aggressive oral care. SLP will continue to follow w/ swallow re-assessment as pt appropriate/schedule permits. RN/MD aware.     Recommendations:  STRICT NPO with frequent aggressive oral care.  Consider alternate means nutrition/hydration  SLP will  continue to follow w/ swallow re-assessment as pt appropriate/schedule permits    Goal:   Pt will tolerate least restrictive diet with no overt clinical s/s aspiration 100% of the time.        Plan:  SLP Services Indicated: Yes  Frequency: 2x week  Discussed POC with patient  SLP - OK to Discharge    Pain:   0-10  0 = No pain.     Inpatient Education:  Extensive education provided to patient regarding current swallow function, recommendations/results, and POC.      Consultations/Referrals/Coordination of Services:   N/A

## 2024-07-23 NOTE — PROGRESS NOTES
Pharmacy Medication History Review    Caryl Tucker is a 53 y.o. female admitted for Acute on chronic respiratory failure with hypercapnia (Multi). Pharmacy reviewed the patient's [id number] medications and allergies for accuracy.    The list below reflects the updated PTA list. Comments regarding how patient may be taking medications differently can be found in the Admit Orders Activity  Prior to Admission Medications   Prescriptions Informant Taking?   acetaminophen (Tylenol) 325 mg tablet Other Yes   Sig: Take 2 tablets (650 mg) by mouth every 6 hours if needed for mild pain (1 - 3).   albuterol 2.5 mg /3 mL (0.083 %) nebulizer solution Other Yes   Sig: Take 3 mL (2.5 mg) by nebulization every 8 hours if needed for wheezing or shortness of breath.   apixaban (Eliquis) 5 mg tablet Other Yes   Sig: Take 1 tablet (5 mg) by mouth 2 times a day.   cholecalciferol (Vitamin D-3) 125 MCG (5000 UT) capsule Other Yes   Sig: Take 1 capsule (125 mcg) by mouth once daily.   docusate sodium (Colace) 100 mg capsule Other Yes   Sig: Take 1 capsule (100 mg) by mouth 2 times a day.   ergocalciferol (Vitamin D-2) 1.25 MG (97965 UT) capsule Other Yes   Sig: Take 1 capsule (1,250 mcg) by mouth 1 (one) time per week. Tuesday   furosemide (Lasix) 20 mg tablet Other Yes   Sig: Take 1 tablet (20 mg) by mouth once daily.   hydroCHLOROthiazide (HYDRODiuril) 50 mg tablet Other Yes   Sig: Take 1 tablet (50 mg) by mouth once daily.   lisinopril 40 mg tablet Other Yes   Sig: Take 1 tablet (40 mg) by mouth once daily.   polyethylene glycol (Glycolax, Miralax) 17 gram packet Other Yes   Sig: Take 17 g by mouth once daily as needed (constipation).   potassium chloride CR 20 mEq ER tablet Other Yes   Sig: Take 1 tablet (20 mEq) by mouth once daily. Do not crush or chew.   senna (Senokot) 8.8 mg/5 mL syrup Other Yes   Sig: Take 5 mL by mouth once daily.      Facility-Administered Medications: None        The list below reflects the updated  allergy list. Please review each documented allergy for additional clarification and justification.  Allergies  Reviewed by Darline Angel, MAK-CATERINA on 7/21/2024        Severity Reactions Comments    Penicillins High Anaphylaxis             Patient declines M2B at discharge. Patient resides at a Nursing Home.    Sources used: Multiple attempts to obtain medication list via fax with failed attempts. Called and received verbal medication list from nursing staff ([facility]- [phone])    Below are additional concerns with the patient's PTA list.  NONE    Sarah Villarreal, PharmD, LTAC, located within [facility]  Transitions of Care Pharmacist  [facility]  Please reach out via Secure Chat for questions, or if no response call [phone] or [facility]era [facility]

## 2024-07-23 NOTE — PROGRESS NOTES
Vancomycin Dosing by Pharmacy  FOLLOW UP    Caryl Tucker is a 53 y.o. female who Pharmacy is consulted to dose vancomycin for abdominal infection.     Based on the patient's indication and renal status, this patient is being dosed based on a goal vancomycin level of 15-20.     Current vancomycin dose: Dosing by levels     Most recent vancomycin level: 15.6 mcg/mL (21 hour level)    Renal function is currently improving.    Estimated Creatinine Clearance: 42.6 mL/min (A) (by C-G formula based on SCr of 1.98 mg/dL (H)).    Vancomycin   Date Value Ref Range Status   07/23/2024 15.6 5.0 - 20.0 ug/mL Final   07/22/2024 15.2 5.0 - 20.0 ug/mL Final       Results from last 7 days   Lab Units 07/23/24  1154 07/23/24  0738 07/23/24  0533 07/23/24  0124 07/22/24  0326 07/22/24  0010   CREATININE mg/dL 1.98* 2.17* 2.12* 1.95*   < > 2.73*  2.73*   BUN mg/dL 39* 40* 42* 42*   < > 63*  63*   WBC AUTO x10*3/uL 8.0 8.4 8.4  --   --  19.1*    < > = values in this interval not displayed.        Visit Vitals  BP 94/56   Pulse 66   Temp 37 °C (98.6 °F)   Resp 19       Gram Stain   Date/Time Value Ref Range Status   07/21/2024 11:21 AM Gram positive cocci, clusters (AA)  Preliminary     Comment:     Anaerobic Bottle Positive     Blood Culture   Date/Time Value Ref Range Status   07/21/2024 11:21 AM No growth at 2 days  Preliminary   07/21/2024 11:21 AM Staphylococcus cohnii (AA)  Preliminary        Susceptibility data from last 90 days.  Collected Specimen Info Organism Clindamycin Erythromycin Oxacillin Tetracycline Trimethoprim/Sulfamethoxazole Vancomycin   07/21/24 Blood culture from Peripheral Venipuncture Staphylococcus cohnii  R  R  S  S  S  S       Assessment/Plan    Vancomycin level is within goal range of 15-20. Will re-dose vancomycin with one time order of 1250 mg.    The next vancomycin level will be ordered for 7/24 PM at ~1600, unless clinically indicated sooner.  Will continue to monitor renal function daily while on  vancomycin and order serum creatinine at least every 48 hours if not already ordered.  Will follow for continued vancomycin needs, clinical response, and signs/symptoms of toxicity.       Bryson Johnson, PharmD

## 2024-07-23 NOTE — PROGRESS NOTES
"Medical Intensive Care - Daily Progress Note   Subjective    Caryl Tucker is a 53 y.o. year old female patient admitted on 7/21/2024 with following ICU needs: acute hypercapnic respiratory failure requiring intubation as well as encephalopathy, likely secondary to hyperglycemia and hypernatremia.     Interval History:  NAEO. In the evening 2g of Magnesium Sulfate and 15mmol of Potassium Phosphate was given. Overnight 40mEq Potassium Chloride were given.    Running lines:   -Norepinephrine: 0.037 @7mL/hr  -Insulin: 3 units at 3mL/hr    During rounds patient was extubated. Patient off pressors and sedation at present.    Meds    Scheduled medications  aztreonam (Azactam) 500 mg in dextrose 5% 50 mL IV, 500 mg, intravenous, q8h  heparin (porcine), 5,000 Units, subcutaneous, q8h  metroNIDAZOLE, 500 mg, intravenous, q8h  oxygen, , inhalation, Continuous - Inhalation  perflutren lipid microspheres, 0.5-10 mL of dilution, intravenous, Once in imaging  perflutren protein A microsphere, 0.5 mL, intravenous, Once in imaging  sulfur hexafluoride microsphr, 2 mL, intravenous, Once in imaging    Continuous medications  D5 % and 0.9 % sodium chloride, 100 mL/hr, Last Rate: 100 mL/hr (07/23/24 0554)  fentaNYL,  mcg/hr, Last Rate: Stopped (07/22/24 1028)  insulin regular, 0-50 Units/hr, Last Rate: 3 Units/hr (07/23/24 0600)  norepinephrine, 0-0.5 mcg/kg/min, Last Rate: 0.037 mcg/kg/min (07/23/24 0631)  propofol, 5-50 mcg/kg/min, Last Rate: Stopped (07/22/24 1028)  sodium chloride, 250 mL/hr, Last Rate: 250 mL/hr (07/23/24 0554)    PRN medications  PRN medications: dextrose, insulin regular, propofol, vancomycin     Objective    VITALS  Visit Vitals  BP 84/58   Pulse 68   Temp 37.2 °C (99 °F)   Resp 15   Ht 1.803 m (5' 11\")   Wt 104 kg (230 lb 2.6 oz)   SpO2 98%   BMI 32.10 kg/m²   Smoking Status Never   BSA 2.28 m²      Physical Exam  Constitutional:       General: She is awake.      Appearance: She is obese.      " Interventions: She is intubated and restrained.   HENT:      Head: Normocephalic and atraumatic.      Mouth/Throat:      Mouth: Mucous membranes are dry.   Eyes:      Extraocular Movements: Extraocular movements intact.   Cardiovascular:      Rate and Rhythm: Normal rate and regular rhythm.      Pulses: Normal pulses.      Heart sounds: Heart sounds are distant. No murmur heard.     No friction rub. No gallop.   Pulmonary:      Effort: Pulmonary effort is normal. No respiratory distress. She is intubated.      Breath sounds: No wheezing or rales.   Abdominal:      General: A surgical scar is present. Bowel sounds are normal. There is no distension.      Palpations: Abdomen is soft. There is no hepatomegaly or splenomegaly.      Tenderness: There is no abdominal tenderness. There is no guarding or rebound.   Musculoskeletal:      Right lower le+ Pitting Edema present.      Left lower le+ Pitting Edema present.   Feet:      Right foot:      Toenail Condition: Right toenails are abnormally thick.      Left foot:      Toenail Condition: Left toenails are abnormally thick.   Skin:     General: Skin is warm and dry.      Capillary Refill: Capillary refill takes less than 2 seconds.      Comments: Bilateral lower extremity skin dry and flaking with dark discoloration     Neurological:      GCS: GCS eye subscore is 4. GCS motor subscore is 6.      Comments: Able to move all fingers and toes, R>L as per baseline.        Intake/Output Summary (Last 24 hours) at 2024 0744  Last data filed at 2024 0732  Gross per 24 hour   Intake 4043.41 ml   Output 1945 ml   Net 2098.41 ml     General Chemistry Labs  Results from last 72 hours   Lab Units 24  0533 24  0124 241   SODIUM mmol/L 150* 151* 153*   POTASSIUM mmol/L 4.4 4.5 4.0   CHLORIDE mmol/L 112* 112* 113*   CO2 mmol/L 28 28 28   BUN mg/dL 42* 42* 45*   CREATININE mg/dL 2.12* 1.95* 2.23*   GLUCOSE mg/dL 359* 365* 273*   CALCIUM mg/dL 9.4  9.7 9.7   ANION GAP mmol/L 14 16 16   EGFR mL/min/1.73m*2 27* 30* 25*   PHOSPHORUS mg/dL 2.9 2.9 2.3*   MAGNESIUM mg/dL 2.15 2.27 2.81*      CBC  Results from last 72 hours   Lab Units 07/23/24  0533 07/22/24  0010 07/21/24  1121   WBC AUTO x10*3/uL 8.4 19.1* 7.7   HEMOGLOBIN g/dL 11.8* 11.9* 13.8   HEMATOCRIT % 37.9 40.1 50.3*   PLATELETS AUTO x10*3/uL 112* 145* 165   NEUTROS PCT AUTO %  --   --  67.2   LYMPHS PCT AUTO %  --   --  14.6   MONOS PCT AUTO %  --   --  16.6   EOS PCT AUTO %  --   --  0.7     Hepatic Labs  Results from last 72 hours   Lab Units 07/22/24  0010 07/21/24  1739 07/21/24  1121   ALT U/L 18  --  9   AST U/L 31  --  13   BILIRUBIN TOTAL mg/dL 0.9  --  0.7   PROTEIN TOTAL g/dL 6.9 8.0 8.7*     MELD 3.0: 21 at 7/23/2024  5:33 AM  MELD-Na: 19 at 7/23/2024  5:33 AM  Calculated from:  Serum Creatinine: 2.12 mg/dL at 7/23/2024  5:33 AM  Serum Sodium: 150 mmol/L (Using max of 137 mmol/L) at 7/23/2024  5:33 AM  Total Bilirubin: 0.9 mg/dL (Using min of 1 mg/dL) at 7/22/2024 12:10 AM  Serum Albumin: 2.9 g/dL at 7/23/2024  5:33 AM  INR(ratio): 1.7 at 7/21/2024 11:21 AM  Age at listing (hypothetical): 53 years  Sex: Female at 7/23/2024  5:33 AM    Coagulation Labs  Results from last 72 hours   Lab Units 07/21/24  1121   INR  1.7*   PROTIME seconds 19.2*     Cardiac Labs  Results from last 72 hours   Lab Units 07/22/24  1612 07/22/24  0010 07/21/24  1121   TROPHSCMC ng/L 955* 304* 28   BNP pg/mL  --   --  6     Micro/ID:   Lab Results   Component Value Date    BLOODCULT No growth at 1 day 07/21/2024    BLOODCULT  07/21/2024     Identification and susceptibility testing to follow     Summary of key imaging results from the last 24 hours  CXR 7/22/2024  1. Interval placement of a right IJ central venous catheter with the  tip projecting over lower SVC.  2. Unchanged consolidation in left lung base. Correlate with concern  for infection.  3. Small left basilar pleural effusion, also stable.  CT Head  7/22/2024  The examination is mildly degraded by patient motion artifact. No acute intracranial hemorrhage or mass effect. Mild degree of nonspecific white matter hypodensity compatible with microangiopathy.  TTE 7/22/2024   1. The left ventricular systolic function is normal, with a visually estimated ejection fraction of 70-75%.  2. Spectral Doppler shows an impaired relaxation pattern of left ventricular diastolic filling.  3. There is no evidence of left ventricular hypertrophy.  4. There is normal right ventricular global systolic function.  Assessment and Plan   Assessment:   Caryl Tucker is a 53 y.o. year old female patient w/ a PMH significant for PMH of CVA c/b left hemiparesis and dysphagia (2016) s/p PEG placement (2016, replaced 5 times, removed unknown date), HTN, PE/DVT on Eliquis, who was admitted to the MICU on 07/21/24 for acute hypercapnic respiratory failure requiring intubation, encephalopathy, hyperglycemia, and hypernatremia. Vfib arrest occurred 7/22- ROSC achieved. Currently on ventilator, being managed with insulin and cautious fluids for electrolyte abnormalities.     Mechanical Ventilation: < 4 days  Sedation/Analgesia: weaned, none at present  Restraints: Restraints indicated as alternative therapies have been attempted and have been ineffective.  Restrain with soft wrist restraints and side rails up x4 until medical devices discontinued and/or patient able to participate with plan of care    Summary for 07/23/24  :  -Continuing hydration for HHS and hypernatremia. Discontinued D5W, continue 1/2 NS, and titrate insulin to 2. Goal of decreasing hypernatremia andhyperglycemia and osmolality. Believe Na is driving osmolar gap at this point.  -Repeat troponin ordered. Down trending at present.  -Extubated during rounds.   -Maintaining pressors post extubation  -Increasing bowel regimen in setting of no bowel movements. Added oral senna/docusate, lactulose, and rectal enema. Patient did not  tolerate rectal enema.  -Repleting electrolytes PRN  -Continuing Vanc, Aztreonam, Metronidazole    Plan:  NEUROLOGY/PSYCH:  #Encephalopathy, likely metabolic, secondary to hypernatremia vs hyperglycemia  #H/o CVA causing hemiplegia, hemiparesis, dysphagia, and dysarthria 2016  -Baseline AAO*3, on presentation altered  -7/21 on admission: Na 150, corrected 171, Glucose >1400   -Admission Urine Drug Screen: Normal  -7/21 CT head: minimal cortical volume loss without hydrocephalus, hemorrhage, or extra-axial collection  -7/22 CT head: no intracranial hemorrhage  -7/22-Weaned off sedation  -7/23: Intubated, not sedated. Responding to commands, able to move all extremities. Likely no additional neurologic changes due to cardiac arrest 7/22 due to consistent physical exam.  Plan:  -Goal RASS: -2  -Extubated during rounds. Will monitor post extubation with VBGs and vitals.  -Treating metabolic derangements as per below, see Endocrinology     CARDIOVASCULAR:  #Shock, resolving  -Home medications: HCTZ 12.5mg, Lisinopril 20mg   -DDX: Cardiovascular, secondary to arrest, septic, hypovolemic secondary to HHS  -7/22: V.fib cardiac arrest. ROSC achieved.  -Patient currently on pressors: Norepinephrine 0.037  -Elevated lactate on presentation. Now wnl.  Plan:  -Holding home medications in setting of ZULMA  -Continuing pressors  -Extubated during rounds. Will monitor post extubation with VBGs and vitals.  -See workup under ID for possible infection    #H/o DVT and PE, 2016, on Eliquis  -CT- No evidence of right heart strain  Plan  -Holding Eliquis inpatient  -Heparin Drip being held in setting of cardiac arrest. Heparin subcutaneous TID for ppx    #S/P Vfib arrest 7/22  #Elevated troponin, possible type 2 MI  -Admission 7/21: Troponin 28, BNP 6  -S/P v.fib cardiac arrest 7/22- Trop: 304 ->955  -Required 3 shocks, epi x 3, amiodarone 300mg, calcium x 1 , HCO3 x 1 during the code. Patient achieved ROSC and was following commands    Plan:  -Holding heparin drip. Heparin subcutaneous TID  -Trending troponin. Concern for cardiac injury s/p cardiac arrest vs contusion 2/2 chest compressions. Concern due to elevated troponin, increasing post arrest, negative on admission.    PULMONARY:  #Acute Hypoxic Hypercapnic Respiratory Failure, requiring intubation  #Primary Metabolic Alkalosis  -Admission 7/21:  Bicarbonate 35  -7/21 ventilated on admission  -7/23 VBG: HCO3: 27.7  Plan:  -Continuing pressors  -Extubated during rounds. Will monitor post extubation with VBGs and vitals.  -Q4H VBG     RENAL/GENITOURINARY:  #ZULMA  -2021: BUN 20s, Cr 0.85  -On admission: BUN 80, Cr 3.57, AG 19, Egfr 14  -7/23: Improving labs: BUN 42, Cr: 1.95, Egfr 30  Plan:  -Nephrology consulted  -Avoiding nephrotoxic medications  -For fluid management, see Endocrinology     GASTROENTEROLOGY:  #H/o Gastrostomy Tube  #Concern Fecal Impaction on Imaging  -Admission: No PEG tube in place, healed abdominal scar noted.  -7/21 CT CAP demonstrated markedly distended rectum with mild thickening of the lower rectum and anorectal junction with large stool ball and mesorectal fat stranding, likely representing fecal impaction with stercoral proctitis  -Home medication: On Senna   -s/p 1 enema.  -No bowel movement since 7/22 during code.  Plan:  -Lactulose and senna/docusate today  -Enema today  -Currently NPO  -See infectious workup     ENDOCRINOLOGY:  #Hyperglycemia, likely HHS  #Hyponatremia, likely secondary to HHS induced hypovolemia  -Glucose 1,498, Na 150, corrected Na 171 on admission  -Relevant labs: Beta Hydroxybutyrate 0.24, TSH 3.43, Lactate 3.5, HA1C: 12.1  -Serum Osmolality: 416 -> 369 -> 344  -Urine Osmolality: 455 (nl)  -Urine Electrolytes:  Na 91, K 37, Cr 23.9, Chloride 108; abnormal: chloride/Cr ratio: 452  -Insulin Drip begun in ED. Held 7/22 in setting of hypokalemia, resumed 7/22  -7/23: Glucose 365, Na 151  Plan:  -Q4H RFP  -Fluids: Administering cautious fluids to  address free water deficit. Free water defecit today 1.3L to correct Na 151 to 140. Will continue with 0.45% NS.  -Insulin drip- Titrated to 2 as holding D5W  -Will monitor urine output as repleting fluids  -Engaged nephrology for fluid recommendations    #Hypernatremia  -On admission 7/21: 150  -7/22: Na 160  -7/23: 151  Plan  -Limiting hypernatremic fluids  -Q4H RFP    #Hyperkalemia  -On admission 7/21: K 6.1. Given 2g Calcium Gluconate in ED  -7/22 - K dropped to 3.1- labs reflecting time patient coded. Aggressively repleted K.  -7/22: K 3.7  -7/23: K 4.5  Plan  -Q4H RFP labs  -Will replete PRN, goal K > 4    #Hypermagnesemia, resolved  -On admission 7/21: Mg 2.93  -Maintaining >2, <2.4  Plan  -Q4H Mg labs  -Fluid repletion     #Hypercalcemia, improving  #Hypoalbuminemia in setting of hypercalcemia  -Admission 7/21: Ca 10.7, + Protein Gap: Total Protein 8.7, Albumin 3.9.   -Concern for multiple myeloma low, but will workup. Likely due to hypovolemia  -Calcium since 7/22: <10.6  -7/22: Albumin 3.0, increased protein gap. 7/23: Albumin 3.9  Plan  -SPEP UPEP pending  -Q4H RFP labs to trend Ca and albumin  -Fluid repletion     HEMATOLOGY:  #Coagulopathy  -On admission 7/21: INR 1.7, PT 19.2  -H/o DVT/ PE  Plan:  -For workup, see cardiology    #Reported H/o Anemia  -Home medication: Ferrous sulfate 325 mg   -Admission 7/21: Hgb 13.8, HCT 50.3, MCV 97, MCHC 27.4, RDW 19.6   -7/23: Hgb 11.8, Hct 37.9, MCV 87  -T&S Q72H; last 7/21/2024  Plan  -Holding ferrous sulfate in setting of possible fecal impaction and infection  -Monitoring CBCs     MUSCULOSKELETAL/ SKIN:  DALJIT     INFECTIOUS DISEASE:  #Possible Pneumonia  -Given 2g Cefepime, 1g Meropenem, and 2g Vancomycin in ED 7/21  -7/21 CXR- bibasilar patchy airspace disease, worse at left lung base, small left effusion  -7/21 Bcx * 2- NG1D, 1 bottle anaerobic microorganisms  -Covid- positive  -MRSA Nares PCR negative  Plan:  -Vancomycin (7/21-present)    #Possible Abdominal  Infection  -Given 2g Cefepime, 1g Meropenem, and 2g Vancomycin in ED 7/21  -7/21 CT CAP- markedly distended rectum with mild thickening of the lower rectum and anorectal junction with large stool ball and mesorectal fat stranding, likely representing fecal impaction with stercoral proctitis  -7/21 UCX- Lactobacillus  Plan:  -Metronidazole (7/21- present)  -Aztreonam (7/21-present)  -For stool burden workup, see GI    ICU Check List   FEN  Fluids: 0.45% NaCl  Electrolytes: Will replete PRN, with goals of Mg >2, K>4  Nutrition: NPO  Prophylaxis:  DVT ppx: Heparin subcutaneous TID  GI ppx/Bowel care: Senna/Docusate liquid, Lactulose, Enema   Hardware:  Access: Endotracheal tube in place  Lines: PIV  Social:  Code: Full Code - confirmed with Radha, proxy, via phone 7/21/2024  HPOA: Radhamaurizio Osullivan ([facility]) - [phone] - Aunt    [facility]    Shruti Oshea MD MPH   07/23/24 at 7:44 AM     Disclaimer: Documentation completed with the information available at the time of input. The times in the chart may not be reflective of actual patient care times, interventions, or procedures. Documentation occurs after the physical care of the patient.

## 2024-07-23 NOTE — PROGRESS NOTES
Pharmacy Admission Order Reconciliation Review    Caryl Tucker is a 53 y.o. female admitted for Acute on chronic respiratory failure with hypercapnia (Multi). Pharmacy reviewed the patient's unreconciled admission medications.    Prior to admission medications that were reviewed and acted on by the pharmacist include:  Acetaminophen  Apixaban  Vitamin D3  Colace  Hydrochlorothiazide  Lisinopril  Miralax  Potassium chloride  These medications have been reconciled.     Any other unreconcilied medications have been addressed and will be ordered or held by the patient's medical team. Medications addressed by the pharmacist may be added or changed by the patient's medical team at any time.    Sarah Villarreal, PharmD  Transitions of Care Pharmacist  [facility]  Please reach out via Secure Chat for questions

## 2024-07-23 NOTE — PROGRESS NOTES
Daily Progress Note    Caryl Tucker is a 53 y.o. female admitted on 7/21/2024 10:22 AM from Nursing home for HHS and hypercapnic RF. Had 1/4 BCx +GPC in clusters. Vancomycin started and Aztreonam/Metronidazole continued. Nephrology consulted for hypernatremia w/ Na in the 160s.     Overnight:  No acute events reported. +BCx in 1/4 vials for GPC clusters. Started on Vanc; Aztreonam and Flagyl continued. Was on D5W for Na correction, now on 1/2 NS given glucose elevation whilst on Insulin gtt. Good UOP w/ 940ccs in eaton bag. Continued on Levophed.     Subjective   Pt was seen at the bedside and in NAD. Pt awake and able to follow commands, state needs.   Endorsed some abdominal pain that was poorly localized. Denied any sx of headache/dizziness, chest pain/tightness, dysuria.     Objective   Vitals: I/O:   Vitals:    07/23/24 0800   BP: 82/55   Pulse: 66   Resp: 10   Temp: 37.1 °C (98.8 °F)   SpO2: 100%        24hr Min/Max:  Temp  Min: 37 °C (98.6 °F)  Max: 37.5 °C (99.5 °F)  Pulse  Min: 62  Max: 84  BP  Min: 73/56  Max: 113/76  Resp  Min: 7  Max: 18  SpO2  Min: 98 %  Max: 100 %   Intake/Output Summary (Last 24 hours) at 7/23/2024 0843  Last data filed at 7/23/2024 0800  Gross per 24 hour   Intake 3986.48 ml   Output 1945 ml   Net 2041.48 ml        Net IO Since Admission: 4,048.61 mL [07/23/24 0843]      PE:  General: Awake, intubated, anarsaca  HEENT: No scleral icterus or conjunctivitis  Skin: No suspect lesions or rashes noted on visible skin  Chest: intubated w/ b/l chest rises, rhonchi w/o crackles   Cardiac: Regular rate and rhythm, normal s1, s2, no M/R/G, no JVD  Abdomen: Soft, enlarged but ND, poorly localized tenderness, no involuntary guarding  : Has eaton in place   EXT: Bl +2 pitting edema, no asymmetry noted  MSK: No focal joint swelling noted  Neuro: Awake. Able to follow commands.     Labs:  CBC RFP   Lab Results   Component Value Date    WBC 8.4 07/23/2024    HGB 11.8 (L) 07/23/2024    HCT 37.9  "07/23/2024    MCV 87 07/23/2024     (L) 07/23/2024    NEUTROABS 5.14 07/21/2024    Lab Results   Component Value Date     (H) 07/23/2024    K 4.4 07/23/2024     (H) 07/23/2024    CO2 28 07/23/2024    BUN 42 (H) 07/23/2024    CREATININE 2.12 (H) 07/23/2024    CREATININE 1.95 (H) 07/23/2024     Lab Results   Component Value Date    MG 2.15 07/23/2024    PHOS 2.9 07/23/2024    CALCIUM 9.4 07/23/2024         Hepatic Function ABG/VBG   Lab Results   Component Value Date    ALT 18 07/22/2024    AST 31 07/22/2024    ALKPHOS 91 07/22/2024     No results found for: \"TBILIHCVFS\", \"BILIDIR\"   Lab Results   Component Value Date    PROTIME 19.2 (H) 07/21/2024    APTT 32 07/07/2021    INR 1.7 (H) 07/21/2024    Lab Results   Component Value Date    LACTATE 8.3 (HH) 07/22/2024        Results from last 7 days   Lab Units 07/23/24  0533 07/22/24  0010 07/21/24  1121   WBC AUTO x10*3/uL 8.4 19.1* 7.7   HEMOGLOBIN g/dL 11.8* 11.9* 13.8   HEMATOCRIT % 37.9 40.1 50.3*   PLATELETS AUTO x10*3/uL 112* 145* 165   NEUTROS PCT AUTO %  --   --  67.2   LYMPHS PCT AUTO %  --   --  14.6   MONOS PCT AUTO %  --   --  16.6   EOS PCT AUTO %  --   --  0.7     Results from last 7 days   Lab Units 07/23/24  0533 07/23/24  0124 07/22/24 2031 07/22/24  0326 07/22/24  0010 07/21/24  2203 07/21/24  1739 07/21/24  1343 07/21/24  1121   SODIUM mmol/L 150* 151* 153*   < > 160*  160*   < > 154*   < > 150*   POTASSIUM mmol/L 4.4 4.5 4.0   < > 3.2*  3.2*   < > 4.8   < > 6.1*   CHLORIDE mmol/L 112* 112* 113*   < > 116*  116*   < > 108*   < > 102   CO2 mmol/L 28 28 28   < > 29  29   < > 29   < > 35*   BUN mg/dL 42* 42* 45*   < > 63*  63*   < > 74*   < > 80*   CREATININE mg/dL 2.12* 1.95* 2.23*   < > 2.73*  2.73*   < > 3.24*   < > 3.57*   CALCIUM mg/dL 9.4 9.7 9.7   < > 9.9  9.9   < > 10.8*   < > 10.7*   PROTEIN TOTAL g/dL  --   --   --   --  6.9  --  8.0  --  8.7*   BILIRUBIN TOTAL mg/dL  --   --   --   --  0.9  --   --   --  0.7   ALK " PHOS U/L  --   --   --   --  91  --   --   --  128*   ALT U/L  --   --   --   --  18  --   --   --  9   AST U/L  --   --   --   --  31  --   --   --  13   GLUCOSE mg/dL 359* 365* 273*   < > 253*  253*   < > 732*   < > 1,498*    < > = values in this interval not displayed.     Results from last 7 days   Lab Units 07/23/24  0533 07/23/24  0124 07/22/24  2031   MAGNESIUM mg/dL 2.15 2.27 2.81*     Results from last 7 days   Lab Units 07/23/24  0809 07/23/24  0737 07/23/24  0610 07/23/24  0537 07/23/24  0533 07/23/24  0339 07/23/24  0215 07/23/24  0124 07/22/24  2202 07/22/24  2031 07/22/24  1652 07/22/24  1612 07/22/24  1327 07/22/24  1150   POCT GLUCOSE mg/dL 250* 334* 340* 328*  --  303*   < >  --    < >  --    < >  --    < >  --    GLUCOSE mg/dL  --   --   --   --  359*  --   --  365*  --  273*  --  296*  --  373*    < > = values in this interval not displayed.     Imaging:  CT head wo IV contrast  Result Date: 7/22/2024  The examination is mildly degraded by patient motion artifact.     CSF Spaces: The ventricles, sulci and basal cisterns are within normal limits.   There is no extraaxial fluid collection.     Parenchyma: Mild patchy nonspecific white matter hypodensities compatible with microangiopathy. The grey-white differentiation is intact. There is no mass effect or midline shift.  There is no intracranial hemorrhage.     Calvarium: The calvarium is unremarkable.     Paranasal sinuses and mastoids: Mild mucosal thickening within the paranasal sinuses. Mastoid air cells are clear.    CT chest abdomen pelvis wo IV contrast  Result Date: 7/21/2024  Chest   1. No acute process in the chest.   2. Dilated main pulmonary artery. Correlate with pulmonary arterial hypertension.     Abdomen-Pelvis   1.  Markedly distended rectum with mild thickening of the lower rectum and anorectal junction with a large stool ball and mesorectal fat stranding. Findings likely represent fecal impaction with stercoral proctitis.   2.  Otherwise no acute process in the abdomen and pelvis.       Medications:  Scheduled Medications:  PRN Medications:    aztreonam (Azactam) 500 mg in dextrose 5% 50 mL IV, 500 mg, intravenous, q8h  heparin (porcine), 5,000 Units, subcutaneous, q8h  metroNIDAZOLE, 500 mg, intravenous, q8h  oxygen, , inhalation, Continuous - Inhalation  perflutren lipid microspheres, 0.5-10 mL of dilution, intravenous, Once in imaging  perflutren protein A microsphere, 0.5 mL, intravenous, Once in imaging  sulfur hexafluoride microsphr, 2 mL, intravenous, Once in imaging     PRN medications: dextrose, insulin regular, propofol, vancomycin       Assessment/Plan:   Caryl Tucker is a 53 y.o. female w/a PMH of CVA c/b left hemiparesis and dysphagia (s/p PEG placement 2016, replaced 5 times, last removed unknown date), HTN, PE/DVT (on Eliquis) who was initially brought in for evaluation of mental status change iso elevated blood glucose and RF, and is now admitted to the MICU with HHS and acute hypoxic/hypercapnic RF, and incidentally found to be COVID+ and BCx growing GPC in clusters. She is currently on  Aztreonam, Vancomycin, Metronidazole for Abx coverage and Levo gtt for pressor support. Nephrology consulted for hypernatremia. ICU team currently giving D5W hypotonic IVF.     Updates:  7/23:  - Na corrected appropriately by 10meq over last 24hrs. On D5W previously but given elevated BS, pt switched to 1/2 NS by ICU team.   - Cr improved to 2.12 today w/ good urine output     #Hypernatremia, resolving   #HHS  :: Na on intake 149, corrected to 170. Current 7/23 corrected 154.  :: Blood Glucose on intake was 1,498 7/21; currently on insulin gtt  :: Serum Osm 378, Urine Osm 455 7/21  :: Hypovolemic on exam and likely due to HHS in addition to chronic poor intake   :: Unable to assess if hypernatremia is symptomatic given pt intubated    #ZULMA, Non-oliguric, stage II - improving   :: In May 2024 Cr was noted to be <1 from OSH records   ::  Admission Cr 3.2, current 2.12   :: Has urine output >100 ml per hour  :: U/A +1 Protein and blood otherwise clear  :: No imaging to exclude obstruction  :: No current indication for dialysis  :: Given cardiac arrest 7/21-22 and successful ROSC, will likely worsen         Recommendations:  - Has had correction w/ D5W, 1/2 NS, so far and given current corrected Na of 154, consider 1/2NS 345cc/hr, D5W 170cc/hr which should correct for max 12meq/24 hrs. If over corrected, page Nephrology fellow.   - Continue to trend RFPs, check labs at least q4-6h when correcting Na  - Manage HHS as per ICU protocol  - Ensure adequate intravascular volume while avoiding overload.  - Avoid nephrotoxins, contrast if possible  - Strict Is/Os  - Renal dosing for medications for latest eGFR, follow medication trough as appropriate    - Nephrology will continue to follow for Hypernatremia and ZULMA      Patient assessment and plan discussed w/ attending Nephrologist on service, Dr. Grande.    Fritz Guzman MD  [facility],  Internal Medicine   24 hour Renal Pager - [phone]

## 2024-07-23 NOTE — SIGNIFICANT EVENT
#12 Fr, small bore feeding tube inserted into right nare with Enteral Access System CORTRAK 2 per provider orders, tube secured with tape,  and wire stylet removed; patient tolerated procedure; feeding tube secured at 69 cm; bedside nurse notified; no bleeding or adverse reaction noted; KUB activated for placement verification.

## 2024-07-24 LAB
ALBUMIN MFR UR ELPH: 38.1 %
ALBUMIN SERPL BCP-MCNC: 2.6 G/DL (ref 3.4–5)
ALBUMIN SERPL BCP-MCNC: 2.6 G/DL (ref 3.4–5)
ALBUMIN SERPL BCP-MCNC: 2.7 G/DL (ref 3.4–5)
ALBUMIN: 3.7 G/DL (ref 3.4–5)
ALPHA 1 GLOBULIN: 0.3 G/DL (ref 0.2–0.6)
ALPHA 2 GLOBULIN: 0.8 G/DL (ref 0.4–1.1)
ALPHA1 GLOB MFR UR ELPH: 8.6 %
ALPHA2 GLOB MFR UR ELPH: 18.8 %
ANION GAP BLDV CALCULATED.4IONS-SCNC: 7 MMOL/L (ref 10–25)
ANION GAP BLDV CALCULATED.4IONS-SCNC: 8 MMOL/L (ref 10–25)
ANION GAP SERPL CALC-SCNC: 11 MMOL/L (ref 10–20)
ANION GAP SERPL CALC-SCNC: 12 MMOL/L (ref 10–20)
ANION GAP SERPL CALC-SCNC: 13 MMOL/L (ref 10–20)
B-GLOBULIN MFR UR ELPH: 15.6 %
BASE EXCESS BLDV CALC-SCNC: 3 MMOL/L (ref -2–3)
BASE EXCESS BLDV CALC-SCNC: 3.3 MMOL/L (ref -2–3)
BETA GLOBULIN: 1.1 G/DL (ref 0.5–1.2)
BODY TEMPERATURE: 37 DEGREES CELSIUS
BODY TEMPERATURE: 37 DEGREES CELSIUS
BUN SERPL-MCNC: 27 MG/DL (ref 6–23)
BUN SERPL-MCNC: 29 MG/DL (ref 6–23)
BUN SERPL-MCNC: 33 MG/DL (ref 6–23)
CA-I BLDV-SCNC: 1.15 MMOL/L (ref 1.1–1.33)
CA-I BLDV-SCNC: 1.17 MMOL/L (ref 1.1–1.33)
CALCIUM SERPL-MCNC: 8 MG/DL (ref 8.6–10.6)
CALCIUM SERPL-MCNC: 8.5 MG/DL (ref 8.6–10.6)
CALCIUM SERPL-MCNC: 8.6 MG/DL (ref 8.6–10.6)
CHLORIDE BLDV-SCNC: 108 MMOL/L (ref 98–107)
CHLORIDE BLDV-SCNC: 110 MMOL/L (ref 98–107)
CHLORIDE SERPL-SCNC: 109 MMOL/L (ref 98–107)
CHLORIDE SERPL-SCNC: 110 MMOL/L (ref 98–107)
CHLORIDE SERPL-SCNC: 111 MMOL/L (ref 98–107)
CO2 SERPL-SCNC: 29 MMOL/L (ref 21–32)
CO2 SERPL-SCNC: 30 MMOL/L (ref 21–32)
CO2 SERPL-SCNC: 30 MMOL/L (ref 21–32)
CREAT SERPL-MCNC: 1.39 MG/DL (ref 0.5–1.05)
CREAT SERPL-MCNC: 1.55 MG/DL (ref 0.5–1.05)
CREAT SERPL-MCNC: 1.62 MG/DL (ref 0.5–1.05)
EGFRCR SERPLBLD CKD-EPI 2021: 37 ML/MIN/1.73M*2
EGFRCR SERPLBLD CKD-EPI 2021: 39 ML/MIN/1.73M*2
EGFRCR SERPLBLD CKD-EPI 2021: 45 ML/MIN/1.73M*2
ERYTHROCYTE [DISTWIDTH] IN BLOOD BY AUTOMATED COUNT: 17.8 % (ref 11.5–14.5)
GAMMA GLOB MFR UR ELPH: 18.9 %
GAMMA GLOBULIN: 2 G/DL (ref 0.5–1.4)
GLUCOSE BLD MANUAL STRIP-MCNC: 131 MG/DL (ref 74–99)
GLUCOSE BLD MANUAL STRIP-MCNC: 136 MG/DL (ref 74–99)
GLUCOSE BLD MANUAL STRIP-MCNC: 147 MG/DL (ref 74–99)
GLUCOSE BLD MANUAL STRIP-MCNC: 157 MG/DL (ref 74–99)
GLUCOSE BLD MANUAL STRIP-MCNC: 158 MG/DL (ref 74–99)
GLUCOSE BLD MANUAL STRIP-MCNC: 159 MG/DL (ref 74–99)
GLUCOSE BLD MANUAL STRIP-MCNC: 167 MG/DL (ref 74–99)
GLUCOSE BLDV-MCNC: 153 MG/DL (ref 74–99)
GLUCOSE BLDV-MCNC: 164 MG/DL (ref 74–99)
GLUCOSE SERPL-MCNC: 147 MG/DL (ref 74–99)
GLUCOSE SERPL-MCNC: 172 MG/DL (ref 74–99)
GLUCOSE SERPL-MCNC: 186 MG/DL (ref 74–99)
HCO3 BLDV-SCNC: 29.5 MMOL/L (ref 22–26)
HCO3 BLDV-SCNC: 29.7 MMOL/L (ref 22–26)
HCT VFR BLD AUTO: 35.8 % (ref 36–46)
HCT VFR BLD EST: 32 % (ref 36–46)
HCT VFR BLD EST: 35 % (ref 36–46)
HGB BLD-MCNC: 10.8 G/DL (ref 12–16)
HGB BLDV-MCNC: 10.6 G/DL (ref 12–16)
HGB BLDV-MCNC: 11.7 G/DL (ref 12–16)
IMMUNOFIXATION COMMENT: ABNORMAL
IMMUNOFIXATION COMMENT: NORMAL
INHALED O2 CONCENTRATION: 24 %
INHALED O2 CONCENTRATION: 36 %
LACTATE BLDV-SCNC: 0.9 MMOL/L (ref 0.4–2)
LACTATE BLDV-SCNC: 1.2 MMOL/L (ref 0.4–2)
MAGNESIUM SERPL-MCNC: 1.95 MG/DL (ref 1.6–2.4)
MAGNESIUM SERPL-MCNC: 2.46 MG/DL (ref 1.6–2.4)
MAGNESIUM SERPL-MCNC: 2.66 MG/DL (ref 1.6–2.4)
MCH RBC QN AUTO: 27 PG (ref 26–34)
MCHC RBC AUTO-ENTMCNC: 30.2 G/DL (ref 32–36)
MCV RBC AUTO: 90 FL (ref 80–100)
NRBC BLD-RTO: 0.3 /100 WBCS (ref 0–0)
OSMOLALITY SERPL: 319 MOSM/KG (ref 280–300)
OSMOLALITY SERPL: 320 MOSM/KG (ref 280–300)
OSMOLALITY SERPL: 321 MOSM/KG (ref 280–300)
OXYHGB MFR BLDV: 63.3 % (ref 45–75)
OXYHGB MFR BLDV: 72.5 % (ref 45–75)
PATH REVIEW - SERUM IMMUNOFIXATION: ABNORMAL
PATH REVIEW - URINE IMMUNOFIXATION: NORMAL
PATH REVIEW-SERUM PROTEIN ELECTROPHORESIS: ABNORMAL
PATH REVIEW-URINE PROTEIN ELECTROPHORESIS: NORMAL
PCO2 BLDV: 51 MM HG (ref 41–51)
PCO2 BLDV: 55 MM HG (ref 41–51)
PH BLDV: 7.34 PH (ref 7.33–7.43)
PH BLDV: 7.37 PH (ref 7.33–7.43)
PHOSPHATE SERPL-MCNC: 3.4 MG/DL (ref 2.5–4.9)
PHOSPHATE SERPL-MCNC: 3.7 MG/DL (ref 2.5–4.9)
PHOSPHATE SERPL-MCNC: 3.9 MG/DL (ref 2.5–4.9)
PLATELET # BLD AUTO: 95 X10*3/UL (ref 150–450)
PO2 BLDV: 38 MM HG (ref 35–45)
PO2 BLDV: 47 MM HG (ref 35–45)
POTASSIUM BLDV-SCNC: 4.2 MMOL/L (ref 3.5–5.3)
POTASSIUM BLDV-SCNC: 4.3 MMOL/L (ref 3.5–5.3)
POTASSIUM SERPL-SCNC: 3.7 MMOL/L (ref 3.5–5.3)
POTASSIUM SERPL-SCNC: 4.2 MMOL/L (ref 3.5–5.3)
POTASSIUM SERPL-SCNC: 4.7 MMOL/L (ref 3.5–5.3)
PROTEIN ELECTROPHORESIS COMMENT: ABNORMAL
RBC # BLD AUTO: 4 X10*6/UL (ref 4–5.2)
SAO2 % BLDV: 65 % (ref 45–75)
SAO2 % BLDV: 73 % (ref 45–75)
SODIUM BLDV-SCNC: 141 MMOL/L (ref 136–145)
SODIUM BLDV-SCNC: 142 MMOL/L (ref 136–145)
SODIUM SERPL-SCNC: 145 MMOL/L (ref 136–145)
SODIUM SERPL-SCNC: 147 MMOL/L (ref 136–145)
SODIUM SERPL-SCNC: 150 MMOL/L (ref 136–145)
UFH PPP CHRO-ACNC: 0.4 IU/ML
UFH PPP CHRO-ACNC: 0.6 IU/ML
UFH PPP CHRO-ACNC: 0.8 IU/ML
URINE ELECTROPHORESIS COMMENT: NORMAL
WBC # BLD AUTO: 7.5 X10*3/UL (ref 4.4–11.3)

## 2024-07-24 PROCEDURE — 2500000004 HC RX 250 GENERAL PHARMACY W/ HCPCS (ALT 636 FOR OP/ED)

## 2024-07-24 PROCEDURE — 2500000001 HC RX 250 WO HCPCS SELF ADMINISTERED DRUGS (ALT 637 FOR MEDICARE OP)

## 2024-07-24 PROCEDURE — 2500000005 HC RX 250 GENERAL PHARMACY W/O HCPCS

## 2024-07-24 PROCEDURE — 37799 UNLISTED PX VASCULAR SURGERY: CPT

## 2024-07-24 PROCEDURE — 2500000002 HC RX 250 W HCPCS SELF ADMINISTERED DRUGS (ALT 637 FOR MEDICARE OP, ALT 636 FOR OP/ED)

## 2024-07-24 PROCEDURE — 83735 ASSAY OF MAGNESIUM: CPT

## 2024-07-24 PROCEDURE — 85520 HEPARIN ASSAY: CPT

## 2024-07-24 PROCEDURE — 82947 ASSAY GLUCOSE BLOOD QUANT: CPT

## 2024-07-24 PROCEDURE — 97162 PT EVAL MOD COMPLEX 30 MIN: CPT | Mod: GP

## 2024-07-24 PROCEDURE — 99232 SBSQ HOSP IP/OBS MODERATE 35: CPT

## 2024-07-24 PROCEDURE — 3E0G76Z INTRODUCTION OF NUTRITIONAL SUBSTANCE INTO UPPER GI, VIA NATURAL OR ARTIFICIAL OPENING: ICD-10-PCS

## 2024-07-24 PROCEDURE — 99291 CRITICAL CARE FIRST HOUR: CPT

## 2024-07-24 PROCEDURE — 80069 RENAL FUNCTION PANEL: CPT

## 2024-07-24 PROCEDURE — 92526 ORAL FUNCTION THERAPY: CPT | Mod: GN

## 2024-07-24 PROCEDURE — 99291 CRITICAL CARE FIRST HOUR: CPT | Performed by: INTERNAL MEDICINE

## 2024-07-24 PROCEDURE — 97166 OT EVAL MOD COMPLEX 45 MIN: CPT | Mod: GO

## 2024-07-24 PROCEDURE — 84132 ASSAY OF SERUM POTASSIUM: CPT

## 2024-07-24 PROCEDURE — 83930 ASSAY OF BLOOD OSMOLALITY: CPT

## 2024-07-24 PROCEDURE — 85027 COMPLETE CBC AUTOMATED: CPT

## 2024-07-24 PROCEDURE — 3E0336Z INTRODUCTION OF NUTRITIONAL SUBSTANCE INTO PERIPHERAL VEIN, PERCUTANEOUS APPROACH: ICD-10-PCS

## 2024-07-24 PROCEDURE — 2020000001 HC ICU ROOM DAILY

## 2024-07-24 RX ORDER — LACTULOSE 10 G/15ML
20 SOLUTION ORAL 3 TIMES DAILY
Status: DISCONTINUED | OUTPATIENT
Start: 2024-07-24 | End: 2024-07-25

## 2024-07-24 RX ORDER — POLYETHYLENE GLYCOL 3350 17 G/17G
17 POWDER, FOR SOLUTION ORAL 2 TIMES DAILY
Status: DISCONTINUED | OUTPATIENT
Start: 2024-07-24 | End: 2024-07-25

## 2024-07-24 RX ORDER — MAGNESIUM SULFATE HEPTAHYDRATE 40 MG/ML
2 INJECTION, SOLUTION INTRAVENOUS ONCE
Status: COMPLETED | OUTPATIENT
Start: 2024-07-24 | End: 2024-07-24

## 2024-07-24 RX ORDER — ONDANSETRON HYDROCHLORIDE 2 MG/ML
4 INJECTION, SOLUTION INTRAVENOUS EVERY 4 HOURS PRN
Status: DISCONTINUED | OUTPATIENT
Start: 2024-07-24 | End: 2024-08-06 | Stop reason: HOSPADM

## 2024-07-24 RX ORDER — POTASSIUM CHLORIDE 1.5 G/1.58G
40 POWDER, FOR SOLUTION ORAL ONCE
Status: COMPLETED | OUTPATIENT
Start: 2024-07-24 | End: 2024-07-24

## 2024-07-24 RX ORDER — INSULIN LISPRO 100 [IU]/ML
0-10 INJECTION, SOLUTION INTRAVENOUS; SUBCUTANEOUS EVERY 6 HOURS
Status: DISCONTINUED | OUTPATIENT
Start: 2024-07-24 | End: 2024-08-06 | Stop reason: HOSPADM

## 2024-07-24 RX ORDER — BISACODYL 10 MG/1
10 SUPPOSITORY RECTAL DAILY
Status: DISCONTINUED | OUTPATIENT
Start: 2024-07-24 | End: 2024-07-24

## 2024-07-24 RX ORDER — BISACODYL 10 MG/1
10 SUPPOSITORY RECTAL ONCE
Status: COMPLETED | OUTPATIENT
Start: 2024-07-24 | End: 2024-07-24

## 2024-07-24 ASSESSMENT — COGNITIVE AND FUNCTIONAL STATUS - GENERAL
DRESSING REGULAR UPPER BODY CLOTHING: A LOT
TURNING FROM BACK TO SIDE WHILE IN FLAT BAD: TOTAL
HELP NEEDED FOR BATHING: A LOT
EATING MEALS: A LOT
CLIMB 3 TO 5 STEPS WITH RAILING: TOTAL
PERSONAL GROOMING: A LOT
MOVING TO AND FROM BED TO CHAIR: TOTAL
DRESSING REGULAR LOWER BODY CLOTHING: A LOT
MOVING FROM LYING ON BACK TO SITTING ON SIDE OF FLAT BED WITH BEDRAILS: TOTAL
TOILETING: TOTAL
WALKING IN HOSPITAL ROOM: TOTAL
MOBILITY SCORE: 6
STANDING UP FROM CHAIR USING ARMS: TOTAL
DAILY ACTIVITIY SCORE: 11

## 2024-07-24 ASSESSMENT — PAIN SCALES - GENERAL
PAINLEVEL_OUTOF10: 0 - NO PAIN

## 2024-07-24 ASSESSMENT — ACTIVITIES OF DAILY LIVING (ADL)
BATHING_ASSISTANCE: MODERATE
ADL_ASSISTANCE: NEEDS ASSISTANCE
ADL_ASSISTANCE: NEEDS ASSISTANCE

## 2024-07-24 ASSESSMENT — PAIN - FUNCTIONAL ASSESSMENT
PAIN_FUNCTIONAL_ASSESSMENT: 0-10

## 2024-07-24 NOTE — CARE PLAN
Problem: Skin  Goal: Prevent/manage excess moisture  Outcome: Progressing  Flowsheets (Taken 7/24/2024 0155)  Prevent/manage excess moisture:   Monitor for/manage infection if present   Moisturize dry skin  Goal: Prevent/minimize sheer/friction injuries  Outcome: Progressing  Flowsheets (Taken 7/24/2024 0155)  Prevent/minimize sheer/friction injuries:   Complete micro-shifts as needed if patient unable. Adjust patient position to relieve pressure points, not a full turn   Increase activity/out of bed for meals   Use pull sheet   HOB 30 degrees or less   Turn/reposition every 2 hours/use positioning/transfer devices   Utilize specialty bed per algorithm  Goal: Promote/optimize nutrition  Outcome: Progressing  Flowsheets (Taken 7/24/2024 0155)  Promote/optimize nutrition: Monitor/record intake including meals  Goal: Promote skin healing  Outcome: Progressing  Flowsheets (Taken 7/24/2024 0155)  Promote skin healing:   Assess skin/pad under line(s)/device(s)   Protective dressings over bony prominences   Turn/reposition every 2 hours/use positioning/transfer devices   Ensure correct size (line/device) and apply per  instructions   Rotate device position/do not position patient on device     Problem: Fall/Injury  Goal: Not fall by end of shift  Outcome: Progressing  Goal: Be free from injury by end of the shift  Outcome: Progressing  Goal: Verbalize understanding of personal risk factors for fall in the hospital  Outcome: Progressing  Goal: Verbalize understanding of risk factor reduction measures to prevent injury from fall in the home  Outcome: Progressing  Goal: Use assistive devices by end of the shift  Outcome: Progressing  Goal: Pace activities to prevent fatigue by end of the shift  Outcome: Progressing     Problem: Fall/Injury  Goal: Not fall by end of shift  Outcome: Progressing

## 2024-07-24 NOTE — PROGRESS NOTES
"Medical Intensive Care - Daily Progress Note   Subjective    Caryl Tucker is a 53 y.o. year old female patient admitted on 7/21/2024 with following ICU needs: acute hypercapnic respiratory failure and shock requiring pressors    Interval History:  NAEO. This morning patient awake and oriented*3. She denies any bowel movements over night or pain or discomfort this morning    Meds    Scheduled medications  aspirin, 81 mg, oral, Daily  atorvastatin, 40 mg, oral, Nightly  insulin glargine, 10 Units, subcutaneous, Nightly  insulin lispro, 0-10 Units, subcutaneous, q6h  lactulose, 20 g, oral, TID  oxygen, , inhalation, Continuous - Inhalation  perflutren lipid microspheres, 0.5-10 mL of dilution, intravenous, Once in imaging  perflutren protein A microsphere, 0.5 mL, intravenous, Once in imaging  polyethylene glycol, 17 g, oral, BID  senna, 5 mL, oral, Nightly  sulfur hexafluoride microsphr, 2 mL, intravenous, Once in imaging    Continuous medications  heparin, 0-4,000 Units/hr, Last Rate: 600 Units/hr (07/24/24 1300)  norepinephrine, 0-0.5 mcg/kg/min, Last Rate: Stopped (07/24/24 1200)    PRN medications  PRN medications: dextrose, heparin     Objective    VITALS  Visit Vitals  BP 82/61 (BP Location: Left arm, Patient Position: Lying)   Pulse 64   Temp 36.5 °C (97.7 °F)   Resp 18   Ht 1.803 m (5' 11\")   Wt 109 kg (239 lb 6.7 oz)   SpO2 96%   BMI 33.39 kg/m²   Smoking Status Never   BSA 2.34 m²      Physical Exam  Constitutional:       General: She is awake.      Appearance: She is obese.   HENT:      Head: Normocephalic and atraumatic.      Mouth/Throat:      Mouth: Mucous membranes are dry.   Eyes:      Extraocular Movements: Extraocular movements intact.   Cardiovascular:      Rate and Rhythm: Normal rate and regular rhythm.      Pulses: Normal pulses.      Heart sounds: Heart sounds are distant. No murmur heard.     No friction rub. No gallop.   Pulmonary:      Effort: Pulmonary effort is normal. No respiratory " distress.      Breath sounds: No wheezing or rales.   Abdominal:      General: A surgical scar is present. Bowel sounds are normal. There is no distension.      Palpations: Abdomen is soft. There is no hepatomegaly or splenomegaly.      Tenderness: There is no abdominal tenderness. There is no guarding or rebound.   Musculoskeletal:      Right lower le+ Pitting Edema present.      Left lower le+ Pitting Edema present.   Feet:      Right foot:      Toenail Condition: Right toenails are abnormally thick.      Left foot:      Toenail Condition: Left toenails are abnormally thick.   Skin:     General: Skin is warm and dry.      Capillary Refill: Capillary refill takes less than 2 seconds.      Comments: Bilateral lower extremity skin dry and flaking with dark discoloration     Neurological:      Mental Status: She is easily aroused. She is lethargic.      GCS: GCS eye subscore is 4. GCS verbal subscore is 5. GCS motor subscore is 6.      Comments: Able to move all fingers and toes, R>L as per baseline.        Intake/Output Summary (Last 24 hours) at 2024 1306  Last data filed at 2024 1300  Gross per 24 hour   Intake 2842.5 ml   Output 1045 ml   Net 1797.5 ml     General Chemistry Labs  Results from last 72 hours   Lab Units 24  0608 24  0217 24  2210   SODIUM mmol/L 145 147* 150*   POTASSIUM mmol/L 4.2 4.7 3.7   CHLORIDE mmol/L 109* 110* 111*   CO2 mmol/L 29 30 30   BUN mg/dL 27* 29* 33*   CREATININE mg/dL 1.39* 1.55* 1.62*   GLUCOSE mg/dL 147* 172* 186*   CALCIUM mg/dL 8.0* 8.6 8.5*   ANION GAP mmol/L 11 12 13   EGFR mL/min/1.73m*2 45* 39* 37*   PHOSPHORUS mg/dL 3.4 3.7 3.9   MAGNESIUM mg/dL 2.46* 2.66* 1.95      CBC  Results from last 72 hours   Lab Units 24  0608 24  1517 24  1154   WBC AUTO x10*3/uL 7.5 7.8 8.0   HEMOGLOBIN g/dL 10.8* 11.3* 11.3*   HEMATOCRIT % 35.8* 37.0 36.4   PLATELETS AUTO x10*3/uL 95* 103* 105*     Hepatic Labs  Results from last 72 hours    Lab Units 07/22/24  0010 07/21/24  1739   ALT U/L 18  --    AST U/L 31  --    BILIRUBIN TOTAL mg/dL 0.9  --    PROTEIN TOTAL g/dL 6.9 8.0     MELD 3.0: 18 at 7/23/2024 10:10 PM  MELD-Na: 17 at 7/23/2024 10:10 PM  Calculated from:  Serum Creatinine: 1.62 mg/dL at 7/23/2024 10:10 PM  Serum Sodium: 150 mmol/L (Using max of 137 mmol/L) at 7/23/2024 10:10 PM  Total Bilirubin: 0.9 mg/dL (Using min of 1 mg/dL) at 7/22/2024 12:10 AM  Serum Albumin: 2.6 g/dL at 7/23/2024 10:10 PM  INR(ratio): 1.7 at 7/21/2024 11:21 AM  Age at listing (hypothetical): 53 years  Sex: Female at 7/23/2024 10:10 PM    Cardiac Labs  Results from last 72 hours   Lab Units 07/22/24  2031 07/22/24  1612 07/22/24  0010   [facility] ng/L 709* 955* 304*     Micro/ID:   Lab Results   Component Value Date    BLOODCULT No growth at 3 days 07/21/2024    BLOODCULT Staphylococcus cohnii (AA) 07/21/2024     Summary of key imaging results from the last 24 hours  No new imaging past 24 hours  Assessment and Plan   Assessment:   Caryl Tucker is a 53 y.o. year old female patient w/ a PMH significant for PMH of CVA c/b left hemiparesis and dysphagia (2016) s/p PEG placement (2016, replaced 5 times, removed unknown date), HTN, PE/DVT on Eliquis, who was admitted to the MICU on 07/21/24 for acute hypercapnic respiratory failure requiring intubation, encephalopathy, hyperglycemia, and hypernatremia. Vfib arrest occurred 7/22- ROSC achieved. Patient currently off pressers, sedation, insulin drip, and extubated.      Mechanical Ventilation: < 4 days  Sedation/Analgesia: None  Restraints: None    Summary for 07/24/24  :  -Discontinued fluids  -Patient off pressors as of rounds  -Disctoninued antibiotics  -Suppository for bowel movement. If no bowel movement, will do manual fecal disimpaction.   -Repleting electrolytes PRN    Plan:  NEUROLOGY/PSYCH:  #Encephalopathy, likely metabolic, secondary to hypernatremia vs hyperglycemia- resolved  #H/o CVA causing hemiplegia,  hemiparesis, dysphagia, and dysarthria 2016  -Baseline AAO*3, on presentation altered  -7/21 on admission: Na 150, corrected 171, Glucose >1400   -Admission Urine Drug Screen: Normal  -7/21 CT head: minimal cortical volume loss without hydrocephalus, hemorrhage, or extra-axial collection  -7/22 CT head: no intracranial hemorrhage  -7/23: Intubated, not sedated. Responding to commands, able to move all extremities. Likely no additional neurologic changes due to cardiac arrest 7/22 due to consistent physical exam.  -7/24: Awake, oriented *3, lethargic  Plan:  -Encephalopathy resolved. Patient lethargic. Will monitor for changes in mental status.      CARDIOVASCULAR:  #Shock, requiring pressors, ddx: cardiovascular secondary to vfib arrest vs hypovolemic secondary to HHS  #S/P Vfib arrest 7/22, less likely ischemic event  #Elevated troponin, possible type 2 MI  -Home medications: HCTZ 12.5mg, Lisinopril 20mg   -Admission 7/21: Troponin 28, BNP 6  -Admission 7/21: TTE: preserved EF with no concerning wall motion abnormalities  -7/22: V.fib cardiac arrest. Required 3 shocks, epi x 3, amiodarone 300mg, calcium x 1 , HCO3 x 1 during the code. Patient achieved ROSC and was following commands   -S/P v.fib cardiac arrest 7/22- Trop: 304 ->955 -> 709  Plan:  -Patient currently on pressors: Norepinephrine 0.037  -Elevated lactate on presentation. Now wnl.  -Cardiology consults  -7/23 began ASA 81mg daily, Atorvastatin 40 secondary to possible ischemic event  Plan:  -Holding home medications in setting of ZULMA  -Discontinued pressors 7/24.  -Per Cardiology recommendations, will obtain angiogram prior to discharge   -DVT management per endocrinology section    PULMONARY:  #Acute Hypoxic Hypercapnic Respiratory Failure, improving  -Admission 7/21:  Bicarbonate 35  -7/21 intubated on admission  -7/23: Extubated  -7/24 VBG: HCO3: 29.7  -Norepinephrine 0.037 continuing. MAPS drop when lower pressor dose.  -7/24: No oxygen requirement,  at baseline  Plan:  -Monitoring post extubation with VBGs and vitals.  -Daily VBG     RENAL/GENITOURINARY:  #ZULMA, improving  -2021: BUN 20s, Cr 0.85  -On admission: BUN 80, Cr 3.57, AG 19, Egfr 14  -7/24: Improving labs: BUN 27, Cr: 1.39, Egfr 45  Plan:  -Nephrology consulted  -Avoiding nephrotoxic medications  -For fluid management, see Endocrinology    #Hypernatremia  -On admission 7/21: 150  -7/22: Na 160  -7/23: 151  Plan  -Limiting hypernatremic fluids  -Daily RFP  -See endocrinology for additional workup of HHS    #Hyperkalemia- resolved  -On admission 7/21: K 6.1. Given 2g Calcium Gluconate in ED  -7/22 - K dropped to 3.1- labs reflecting time patient coded. Aggressively repleted K.  -7/22: K 3.7 -> 7/23 4.5 -> 7/24 4.2  Plan  -Daily RFP labs  -Maintaining goal K > 4  -Will replete PRN    #Hypermagnesemia  -On admission 7/21: Mg 2.93  -7/24: 2.46  Plan  -Daily Mg labs  -Maintaining >2, <2.4  -Will replete Mg PRN    #Hypercalcemia, POA- resolved  #Hypocalcemia in setting of Hypoalbuminemia  -Admission 7/21: Ca 10.7, + Protein Gap: Total Protein 8.7, Albumin 3.9.   -Concern for multiple myeloma low, but will workup.   -Calcium since 7/22-23: WNL -> 7/24: 8.0, corrected 8.6  -7/22: Albumin 3.0 -> 7/23 3.9 -> 7/24 2.6  Plan  -SPEP UPEP pending  -Daily RFP labs   -Will replete Ca PRN     GASTROENTEROLOGY:  #H/o Gastrostomy Tube  #Concern Fecal Impaction on Imaging  #Constipation  -Admission: No PEG tube in place, healed abdominal scar noted.  -7/21 CT CAP: markedly distended rectum with mild thickening of the lower rectum and anorectal junction with large stool ball and mesorectal fat stranding, likely representing fecal impaction with stercoral proctitis  -Home medication: On Senna   -7/21: received 1 enema. Did not tolerate subsequent enemas. Unable to maintain fluid in rectum for efficient use.  -No bowel movement since 7/22 during code. Passing gas.  -7/23 SLP Eval s/p extubation. Patient recommended to be  strict NPO with aggressive oral care.  Plan:  -Bowel regimen: Lactulose and senna/docusate   -Suppositories today. If no bowel movement post suppository will manually disimpact.   -Currently NPO  -Will consult nutrition for tube feed recommendations  -See infectious workup    ENDOCRINOLOGY:  #HHS in the setting of hyperglycemia and hypernatremia- resolved  -Admission 7/21: Glucose 1,498, Na 150  -Admission Relevant labs: Beta Hydroxybutyrate 0.24, TSH 3.43, Lactate 3.5, HA1C: 12.1  -Urine Osmolality: 455 (nl)  -Urine Electrolytes:  Na 91, K 37, Cr 23.9, Chloride 108; abnormal: chloride/Cr ratio: 452  -[facility] Protocol Insulin: Drip begun in ED, held 7/22 in setting of hypokalemia, resumed 7/23 and titrated down, discontinued 7/23. Current management: Lantus 10 and Moderate ISS  -[facility] Protocol Fluids: Current management includes 0.45% NaCl and free water flushes. Osmolar gap closed, discontinued D5W  -Trending glucose and Na: 7/23: Glucose 147, Na 145  -Trending Serum Osmolality: 7/21 416 -> 369 -> 344 -> 7/24 319  -Consulted nephrology, following recommendations  Plan:  -Daily RFP  -Continuing Lantus 10 and Moderate ISS  -Fluids: free water flushes  -Will consult nutrition for tube feed recommendations     HEMATOLOGY:  #Reported H/o Anemia, POA  -Home medication: Ferrous sulfate 325 mg   -Admission 7/21: Hgb 13.8, HCT 50.3, MCV 97, MCHC 27.4, RDW 19.6   -7/23: restarted Heparin inpatient  -7/24: Hgb 10.8, HCT 35.8, MCV 90, MCHC 30.2, RDW 17.8   Plan  -T&S Q72H; last 7/23  -Holding ferrous sulfate in setting of possible fecal impaction and infection  -Monitoring CBCs    #Thrombocytopenia, 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 Will monitor     #H/o DVT/PE, on Eliquis at home  -Admission 7/21: INR 1.7, PT 19.2, labs likely reflect chronic Eliquis usage  -Admission 7/21: Held Eliquis  -Admission 7/21 CT- No evidence of right heart  strain  -7/23: restarted Heparin inpatient  -Cardiology consulted  Plan:  -Continuing Heparin inpatient.   -Will resume Eliquis outpatient     MUSCULOSKELETAL/ SKIN:  PT/OT following     INFECTIOUS DISEASE:  #COVID   -7/21 CXR- bibasilar patchy airspace disease, worse at left lung base, small left effusion  -No leukocytosis during admission  -Vancomycin (7/21-7/24). Discontinued upon negative MRSA Nares PCR.     -Covid- positive. No treatment indicated at present. Patient denies cough or URI symptoms.     #Possible Abdominal Infection  -Given 2g Cefepime, 1g Meropenem, and 2g Vancomycin in ED 7/21  -7/21 CT CAP- likely fecal impaction with stercoral proctitis  -7/21 Bcx * 2- One bottle positive for staphylococcus cohnii, anaerobic gram positive cocci in clusters. Believed to be contaminant.   -No bowel movements 7/22 to present.  -No leukocytosis during admission  -Vancomycin (7/21-7/24). Discontinued upon negative MRSA Nares PCR.     -Metronidazole (7/21- 7/24)  -Aztreonam (7/21-7/24)  -For stool burden workup, see GI    ICU Check List   FEN  Fluids: Free water flushes  Electrolytes: Will replete PRN, with goals of Mg >2, K>4  Nutrition: TPN  Prophylaxis:  DVT ppx: Heparin  GI ppx/Bowel care: Senna/Docusate liquid, Lactulose, Suppositories   Hardware:  Drain: Urinary catheter  Lines: PIV, central line  Social:  Code: Full Code - confirmed with Radha, proxy, via phone 7/21/2024  HPOA: Radha Osullivan ([facility]) - [phone] - Aunt    Disposition: MICU    Shruti Oshea MD MPH   07/24/24 at 1:06 PM     Disclaimer: Documentation completed with the information available at the time of input. The times in the chart may not be reflective of actual patient care times, interventions, or procedures. Documentation occurs after the physical care of the patient.

## 2024-07-24 NOTE — CONSULTS
Nutrition Note:   Nutrition Assessment    Reason for Assessment: Provider consult order (updated TF rec's s/p extubation)    Continues in droplet precautions in MICU. Extubated 7/23.     Nutrition History:  Food and Nutrient History: Corpack NG placed 7/23 following failed swallow eval.       Nutrition Significant Labs:  CBC Trend:   Results from last 7 days   Lab Units 07/24/24  0608 07/23/24  1517 07/23/24  1154 07/23/24  0738   WBC AUTO x10*3/uL 7.5 7.8 8.0 8.4   RBC AUTO x10*6/uL 4.00 4.17 4.22 4.33   HEMOGLOBIN g/dL 10.8* 11.3* 11.3* 11.6*   HEMATOCRIT % 35.8* 37.0 36.4 37.8   MCV fL 90 89 86 87   PLATELETS AUTO x10*3/uL 95* 103* 105* 115*    , A1C:  Lab Results   Component Value Date    HGBA1C 12.1 (H) 07/21/2024   , BG POCT trend:   Results from last 7 days   Lab Units 07/24/24  0910 07/24/24  0746 07/24/24  0346 07/23/24  2329 07/23/24  2208   POCT GLUCOSE mg/dL 136* 167* 147* 165* 170*    , Renal Lab Trend:   Results from last 7 days   Lab Units 07/24/24  0608 07/24/24  0217 07/23/24  2210 07/23/24  1517   POTASSIUM mmol/L 4.2 4.7 3.7 4.1   PHOSPHORUS mg/dL 3.4 3.7 3.9 4.4   SODIUM mmol/L 145 147* 150* 150*   MAGNESIUM mg/dL 2.46* 2.66* 1.95 2.16   EGFR mL/min/1.73m*2 45* 39* 37* 30*   BUN mg/dL 27* 29* 33* 37*   CREATININE mg/dL 1.39* 1.55* 1.62* 1.97*        Nutrition Specific Medications:  bisacodyl, 10 mg, rectal, Once  insulin glargine, 10 Units, subcutaneous, Nightly  insulin lispro, 0-10 Units, subcutaneous, q6h  lactulose, 20 g, oral, TID  polyethylene glycol, 17 g, oral, BID  senna, 5 mL, oral, Nightly    Continuous medications  heparin, 0-4,000 Units/hr, Last Rate: 600 Units/hr (07/24/24 0900)  norepinephrine, 0-0.5 mcg/kg/min, Last Rate: 0.03 mcg/kg/min (07/24/24 0900)        I/O:   Last BM Date: 07/22/24 described as large, soft    Dietary Orders (From admission, onward)       Start     Ordered    07/21/24 1701  NPO Diet; Effective now  Diet effective now         07/21/24 1700                      Estimated Needs:   Total Energy Estimated Needs (kCal):  (3006-6848)  Method for Estimating Needs: MSJ using 93 kgs = 1624 ;  ~23-25 kcals/kg of ideal BW  Total Protein Estimated Needs (g): 100 g  Method for Estimating Needs: ~1.3 gm/kg ideal BW  Total Fluid Estimated Needs (mL):  (per team)           Nutrition Diagnosis   Diagnosis Status (2): New  Nutrition Diagnosis 2: Swallowing difficulity  Related to (2): failed swallow eval with SLP 7/23  As Evidenced by (2): NPO status s/p extubation with NG placed       Nutrition Interventions/Recommendations         Nutrition Prescription:  Individualized Nutrition Prescription Provided for : tube feeds s/p extubation        Nutrition Interventions:   Interventions: Enteral intake  Goal: Glucerna 1.5 goal rate @ 50 mls/hr continuous  = 1800 kcals and 99 gm protein   Begin @ 20 mls/hr, increase by 15mls every 68 hours as tolerated   Add FWF per ICU team - goal TF will provide ~900mls/d         Nutrition Monitoring and Evaluation   Food/Nutrient Related History Monitoring  Monitoring and Evaluation Plan: Enteral and parenteral nutrition intake  Enteral and Parenteral Nutrition Intake: Enteral nutrition intake, Enteral nutrition formula/solution  Criteria: initiate & titrate TF to goal rate within 48 hours  Additional Plans: EN support to meet >75% est needs         Biochemical Data, Medical Tests and Procedures  Monitoring and Evaluation Plan: Electrolyte/renal panel, Glucose/endocrine profile  Criteria: WNL  Criteria: WNL              Time Spent (min): 30 minutes

## 2024-07-24 NOTE — PROGRESS NOTES
"Speech-Language Pathology  Adult Inpatient Swallow Treatment    Patient Name: Caryl Tucker  MRN: [id number]  Today's Date: 7/24/2024   Start Time: 1610  Stop Time: 1625  Time Calculation (min): 15    Impression:   Swallow re-assessment completed. Pt cleared for evaluation by RN. Per discussion w/ pt's place of residence, pt was tolerating a Pureed (Level 4) Solids & Pudding Thick Liquid diet per a bedside swallow evaluation completed ~1 year ago; SLP unable to locate documentation in EMR. Awake/alert and upright in bed for session w/ dobhoff in place. Improvement in clinical presentation this date e/b increased wakefulness/alertness, increased verbal output/intelligibility, and now able to provide details re: h/o dysphagia. Reports eating \"grits and drinks\" at previous facility. Wet vocal quality at start of session; cleared w/ weak volitional cough prior to administration of PO trials.     Trials of tsp sips water x2 and single sip water via straw x1 were given (pt politely declined ice chip trials). Normal oral management and no overt difficulty w/ tsp sips. Significant overt clinical s/s aspiration following single sip water via straw characterized by immediate prolonged congested coughing and increased WOB w/ RR increase from 15 to 26. No further trials given 2/2 c/f aspiration.     Pt persistently not appropriate for initiation of PO diet or instrumental assessment given severity of swallow function. Continue to recommend NPO w/ frequent aggressive oral care. 2-3 tsp sips water/hr allowed for pleasure, safe swallow stimulation, and after oral care to prevent buildup of bacteria within the oropharynx. SLP will continue to follow w/ swallow re-assessment as pt appropriate/schedule permits. RN aware.     Recommendations:  NPO with frequent aggressive oral care.  2-3 tsp sips water/hr allowed for pleasure, safe swallow stimulation, and after oral care to prevent buildup of bacteria within the oropharynx  SLP will " continue to follow w/ swallow re-assessment as pt appropriate/schedule permits    Goal:   Pt will tolerate least restrictive diet with no overt clinical s/s aspiration 100% of the time.   Start Date:   Expected Time Frame to Meet Goal:        Plan:  SLP Services Indicated: Yes  Frequency: 2x week  Discussed POC with patient  SLP - OK to Discharge    Pain:   0-10  0 = No pain.     Inpatient Education:  Extensive education provided to patient regarding current swallow function, recommendations/results, and POC.      Consultations/Referrals/Coordination of Services:   N/A

## 2024-07-24 NOTE — CARE PLAN
The patient's goals for the shift include        Problem: Skin  Goal: Participates in plan/prevention/treatment measures  Outcome: Progressing  Flowsheets (Taken 7/24/2024 0824)  Participates in plan/prevention/treatment measures: Elevate heels  Goal: Prevent/manage excess moisture  Outcome: Progressing  Flowsheets (Taken 7/24/2024 0824)  Prevent/manage excess moisture: Monitor for/manage infection if present  Goal: Prevent/minimize sheer/friction injuries  Outcome: Progressing  Flowsheets (Taken 7/24/2024 0824)  Prevent/minimize sheer/friction injuries:   Complete micro-shifts as needed if patient unable. Adjust patient position to relieve pressure points, not a full turn   HOB 30 degrees or less   Turn/reposition every 2 hours/use positioning/transfer devices   Utilize specialty bed per algorithm   Use pull sheet  Goal: Promote/optimize nutrition  Outcome: Progressing  Flowsheets (Taken 7/24/2024 0824)  Promote/optimize nutrition: Monitor/record intake including meals  Goal: Promote skin healing  Outcome: Progressing  Flowsheets (Taken 7/24/2024 0824)  Promote skin healing:   Assess skin/pad under line(s)/device(s)   Turn/reposition every 2 hours/use positioning/transfer devices   Protective dressings over bony prominences     Problem: Fall/Injury  Goal: Not fall by end of shift  Outcome: Progressing  Goal: Be free from injury by end of the shift  Outcome: Progressing  Goal: Verbalize understanding of personal risk factors for fall in the hospital  Outcome: Progressing  Goal: Verbalize understanding of risk factor reduction measures to prevent injury from fall in the home  Outcome: Progressing  Goal: Use assistive devices by end of the shift  Outcome: Progressing  Goal: Pace activities to prevent fatigue by end of the shift  Outcome: Progressing     Problem: Pain - Adult  Goal: Verbalizes/displays adequate comfort level or baseline comfort level  Outcome: Progressing     Problem: Safety - Adult  Goal: Free from  fall injury  Outcome: Progressing     Problem: Discharge Planning  Goal: Discharge to home or other facility with appropriate resources  Outcome: Progressing     Problem: Chronic Conditions and Co-morbidities  Goal: Patient's chronic conditions and co-morbidity symptoms are monitored and maintained or improved  Outcome: Progressing     Problem: Diabetes  Goal: Achieve decreasing blood glucose levels by end of shift  Outcome: Progressing  Goal: Increase stability of blood glucose readings by end of shift  Outcome: Progressing  Goal: Decrease in ketones present in urine by end of shift  Outcome: Progressing  Goal: Maintain electrolyte levels within acceptable range throughout shift  Outcome: Progressing  Goal: Maintain glucose levels >70mg/dl to <250mg/dl throughout shift  Outcome: Progressing  Goal: No changes in neurological exam by end of shift  Outcome: Progressing  Goal: Learn about and adhere to nutrition recommendations by end of shift  Outcome: Progressing  Goal: Vital signs within normal range for age by end of shift  Outcome: Progressing  Goal: Increase self care and/or family involovement by end of shift  Outcome: Progressing  Goal: Receive DSME education by end of shift  Outcome: Progressing

## 2024-07-24 NOTE — PROGRESS NOTES
Daily Progress Note  Reason For Consult:  Caryl Tucker is a 53 y.o. female admitted on 7/21/2024 10:22 AM from Nursing home for HHS and hypercapnic RF. Had 1/4 BCx +GPC in clusters. Vancomycin started and Aztreonam/Metronidazole continued. Had Vfib Arrest requiring multiple rounds of compressions and 3x shocks, 3x Epi and Amiodarone 300mg before ROSC was achieved. Stay also c/b significant stool burden in lower rectum. Nephrology consulted for hypernatremia w/ Na in the 160s.     Overnight:  No acute events reported    Subjective   Pt was seen at the bedside and in NAD. She has been extubated and is A/O x3. Abdominal pain is improved but still present. Denied any sx of headache/dizziness, chest pain/tightness, dysuria.     Objective   Vitals: I/O:   Vitals:    07/24/24 0600   BP: 80/59   Pulse: 61   Resp: 16   Temp: 36.4 °C (97.5 °F)   SpO2: 100%        24hr Min/Max:  Temp  Min: 36.3 °C (97.3 °F)  Max: 37 °C (98.6 °F)  Pulse  Min: 59  Max: 79  BP  Min: 77/64  Max: 104/61  Resp  Min: 11  Max: 23  SpO2  Min: 96 %  Max: 100 %   Intake/Output Summary (Last 24 hours) at 7/24/2024 0824  Last data filed at 7/24/2024 0656  Gross per 24 hour   Intake 3119.78 ml   Output 945 ml   Net 2174.78 ml        Net IO Since Admission: 6,263.39 mL [07/24/24 0824]      PE:  General: Awake, intubated, anarsaca  HEENT: No scleral icterus or conjunctivitis  Skin: No suspect lesions or rashes noted on visible skin  Chest: intubated w/ b/l chest rises, rhonchi w/o crackles   Cardiac: Regular rate and rhythm, normal s1, s2, no M/R/G, no JVD  Abdomen: Soft, enlarged but ND, poorly localized tenderness, no involuntary guarding  : Has eaton in place   EXT: Bl +2 pitting edema, no asymmetry noted  MSK: No focal joint swelling noted  Neuro: Awake, A/Ox3    Labs:  CBC RFP   Lab Results   Component Value Date    WBC 7.5 07/24/2024    HGB 10.8 (L) 07/24/2024    HCT 35.8 (L) 07/24/2024    MCV 90 07/24/2024    PLT 95 (L) 07/24/2024    NEUTROABS 5.14  "07/21/2024    Lab Results   Component Value Date     07/24/2024    K 4.2 07/24/2024     (H) 07/24/2024    CO2 29 07/24/2024    BUN 27 (H) 07/24/2024    CREATININE 1.39 (H) 07/24/2024    CREATININE 1.55 (H) 07/24/2024     Lab Results   Component Value Date    MG 2.46 (H) 07/24/2024    PHOS 3.4 07/24/2024    CALCIUM 8.0 (L) 07/24/2024         Hepatic Function ABG/VBG   Lab Results   Component Value Date    ALT 18 07/22/2024    AST 31 07/22/2024    ALKPHOS 91 07/22/2024     No results found for: \"TBILIHCVFS\", \"BILIDIR\"   Lab Results   Component Value Date    PROTIME 19.2 (H) 07/21/2024    APTT 32 07/07/2021    INR 1.7 (H) 07/21/2024    Lab Results   Component Value Date    LACTATE 8.3 (HH) 07/22/2024        Results from last 7 days   Lab Units 07/24/24  0608 07/23/24  1517 07/23/24  1154 07/22/24  0010 07/21/24  1121   WBC AUTO x10*3/uL 7.5 7.8 8.0   < > 7.7   HEMOGLOBIN g/dL 10.8* 11.3* 11.3*   < > 13.8   HEMATOCRIT % 35.8* 37.0 36.4   < > 50.3*   PLATELETS AUTO x10*3/uL 95* 103* 105*   < > 165   NEUTROS PCT AUTO %  --   --   --   --  67.2   LYMPHS PCT AUTO %  --   --   --   --  14.6   MONOS PCT AUTO %  --   --   --   --  16.6   EOS PCT AUTO %  --   --   --   --  0.7    < > = values in this interval not displayed.     Results from last 7 days   Lab Units 07/24/24  0608 07/24/24  0217 07/23/24  2210 07/22/24  0326 07/22/24  0010 07/21/24  2203 07/21/24  1739 07/21/24  1343 07/21/24  1121   SODIUM mmol/L 145 147* 150*   < > 160*  160*   < > 154*   < > 150*   POTASSIUM mmol/L 4.2 4.7 3.7   < > 3.2*  3.2*   < > 4.8   < > 6.1*   CHLORIDE mmol/L 109* 110* 111*   < > 116*  116*   < > 108*   < > 102   CO2 mmol/L 29 30 30   < > 29  29   < > 29   < > 35*   BUN mg/dL 27* 29* 33*   < > 63*  63*   < > 74*   < > 80*   CREATININE mg/dL 1.39* 1.55* 1.62*   < > 2.73*  2.73*   < > 3.24*   < > 3.57*   CALCIUM mg/dL 8.0* 8.6 8.5*   < > 9.9  9.9   < > 10.8*   < > 10.7*   PROTEIN TOTAL g/dL  --   --   --   --  6.9  --  " 8.0  --  8.7*   BILIRUBIN TOTAL mg/dL  --   --   --   --  0.9  --   --   --  0.7   ALK PHOS U/L  --   --   --   --  91  --   --   --  128*   ALT U/L  --   --   --   --  18  --   --   --  9   AST U/L  --   --   --   --  31  --   --   --  13   GLUCOSE mg/dL 147* 172* 186*   < > 253*  253*   < > 732*   < > 1,498*    < > = values in this interval not displayed.     Results from last 7 days   Lab Units 07/24/24  0608 07/24/24  0217 07/23/24  2210   MAGNESIUM mg/dL 2.46* 2.66* 1.95     Results from last 7 days   Lab Units 07/24/24  0746 07/24/24  0608 07/24/24  0346 07/24/24  0217 07/23/24  2329 07/23/24  2210 07/23/24  2208 07/23/24  1957 07/23/24  1520 07/23/24  1517 07/23/24  1203 07/23/24  1154   POCT GLUCOSE mg/dL 167*  --  147*  --  165*  --  170* 229*   < >  --    < >  --    GLUCOSE mg/dL  --  147*  --  172*  --  186*  --   --   --  287*  --  334*    < > = values in this interval not displayed.     Imaging:  TTE   Result Date: 7/22/2024   1. The left ventricular systolic function is normal, with a visually estimated ejection fraction of 70-75%.   2. Spectral Doppler shows an impaired relaxation pattern of left ventricular diastolic filling.   3. There is no evidence of left ventricular hypertrophy.   4. There is normal right ventricular global systolic function.    CT head wo IV contrast  Result Date: 7/22/2024  The examination is mildly degraded by patient motion artifact.     CSF Spaces: The ventricles, sulci and basal cisterns are within normal limits.   There is no extraaxial fluid collection.     Parenchyma: Mild patchy nonspecific white matter hypodensities compatible with microangiopathy. The grey-white differentiation is intact. There is no mass effect or midline shift.  There is no intracranial hemorrhage.     Calvarium: The calvarium is unremarkable.     Paranasal sinuses and mastoids: Mild mucosal thickening within the paranasal sinuses. Mastoid air cells are clear.    CT chest abdomen pelvis wo IV  contrast  Result Date: 7/21/2024  Chest   1. No acute process in the chest.   2. Dilated main pulmonary artery. Correlate with pulmonary arterial hypertension.     Abdomen-Pelvis   1.  Markedly distended rectum with mild thickening of the lower rectum and anorectal junction with a large stool ball and mesorectal fat stranding. Findings likely represent fecal impaction with stercoral proctitis.   2. Otherwise no acute process in the abdomen and pelvis.     Medications:  Scheduled Medications:  PRN Medications:    aspirin, 81 mg, oral, Daily  atorvastatin, 40 mg, oral, Nightly  aztreonam (Azactam) 500 mg in dextrose 5% 50 mL IV, 500 mg, intravenous, q8h  insulin glargine, 10 Units, subcutaneous, Nightly  insulin lispro, 0-10 Units, subcutaneous, TID  metroNIDAZOLE, 500 mg, intravenous, q8h  oxygen, , inhalation, Continuous - Inhalation  perflutren lipid microspheres, 0.5-10 mL of dilution, intravenous, Once in imaging  perflutren protein A microsphere, 0.5 mL, intravenous, Once in imaging  senna, 5 mL, oral, Nightly  sulfur hexafluoride microsphr, 2 mL, intravenous, Once in imaging     PRN medications: dextrose, heparin, insulin regular, vancomycin       Assessment/Plan:   Caryl Tucker is a 53 y.o. female w/a PMH of CVA c/b left hemiparesis and dysphagia (s/p PEG placement 2016, replaced 5 times, last removed unknown date), HTN, PE/DVT (on Eliquis) who was initially brought in for evaluation of mental status change iso elevated blood glucose and RF, and is now admitted to the MICU with HHS and acute hypoxic/hypercapnic RF, and incidentally found to be COVID+ and BCx growing GPC in clusters. She is currently on  Aztreonam, Vancomycin, Metronidazole for Abx coverage and Levo gtt for pressor support. Nephrology consulted for hypernatremia. ICU team currently giving 1/2 NS hypotonic IVF.     Updates:  7/24:  - Na now corrected to 145. 1/2 NS d/luigi by ICU team and plan to do any as needed correction w/ FWF. Pt has OG tube  in place given SLP eval for continued NPO status.   - Cr 1.67, BUN 27, UOP 945cc - all significantly improved    - Abx d/luigi given primary team sense that BCx+ GPC was contaminant. No other evidence for sepsis.     7/23:  - Na corrected appropriately by 10meq over last 24hrs. On D5W previously but given elevated BS, pt switched to 1/2 NS by ICU team.   - Cr improved to 2.12 today w/ good urine output     #Hypernatremia, resolved   #HHS  :: Na on intake 149, corrected to 170. Current 7/24 145.  :: Blood Glucose on intake was 1,498 7/21; currently on insulin gtt  :: Serum Osm 378, Urine Osm 455 7/21  :: Hypovolemic on exam and likely due to HHS in addition to chronic poor intake   :: Unable to assess if hypernatremia was symptomatic given pt intubated    #ZULMA, Non-oliguric, stage I - improving   :: In May 2024 Cr was noted to be <1 from OSH records   :: Admission Cr 3.2, current 1.69  :: Has urine output >100 ml per hour  :: U/A +1 Protein and blood otherwise clear  :: No imaging to exclude obstruction  :: No current indication for dialysis  :: Cardiac arrest 7/21-22 and successful ROSC - Kidney function did not worsen from this        Recommendations:  - As needed FWD calc and FWF if w/ OG tube   - Manage HHS as per ICU protocol  - Ensure adequate intravascular volume while avoiding volume overload  - Replete electrolytes as needed  - Avoid nephrotoxins, contrast if possible  - Strict Is/Os  - Renal dosing for medications for latest eGFR, follow medication trough as appropriate    - Nephrology will sign off for now. Please re-engage Team if any other concerns.     Patient assessment and plan discussed w/ attending Nephrologist on service, Dr. Grande.    Fritz Guzman MD  PGY 2,  Internal Medicine   24 hour Renal Pager - [phone]

## 2024-07-24 NOTE — PROGRESS NOTES
Physical Therapy    Physical Therapy Evaluation    Patient Name: Caryl Tucker  MRN: [id number]  Today's Date: 7/24/2024   Time Calculation  Start Time: 1235  Stop Time: 1259  Time Calculation (min): 24 min    Assessment/Plan   PT Assessment  PT Assessment Results: Decreased strength, Decreased endurance, Impaired balance, Decreased mobility, Decreased cognition, Impaired vision, Obesity  Rehab Prognosis: Fair  End of Session Communication: Bedside nurse  End of Session Patient Position: Bed, 3 rail up, Alarm off, not on at start of session  IP OR SWING BED PT PLAN  Inpatient or Swing Bed: Inpatient  PT Plan  Treatment/Interventions: Bed mobility, Transfer training, Balance training, Neuromuscular re-education, Strengthening, Endurance training, Therapeutic exercise, Therapeutic activity, Positioning, Postural re-education  PT Plan: Ongoing PT  PT Frequency: 3 times per week  PT Discharge Recommendations: Moderate intensity level of continued care  PT Recommended Transfer Status: Total assist  PT - OK to Discharge: Yes      Subjective   General Visit Information:  General  Reason for Referral: from nursing home, [facility]-> patient hypoxic in the 80s on room air, tachycardic, hypotensive, hyperglycemic to the 400s, and unable to answer questions with drool pooling in her mouth ; acute hypercapnic respiratory failure requiring intubation as well as encephalopathy, likely secondary to hyperglycemia and hypernatremia. Now extubated; also COVID (+)  Past Medical History Relevant to Rehab: CVA c/b left hemiparesis and dysphagia (2016) s/p PEG placement (2016, replaced 5 times, removed unknown date), HTN, PE/DVT on Eliquis  Family/Caregiver Present: No  Co-Treatment: OT  Co-Treatment Reason: hx CVA with residual deficits; unknown PLOF at nursing home; isolation; x2 skilled assist for all mobility required  Prior to Session Communication: Bedside nurse  Patient Position Received: Bed, 3 rail up, Alarm off, not on at start of  session  General Comment: agreeable to participate in therapy. Dobhoff, tele, heparin gtt, all PPE donned/doffed according to COVID-19 protocol.   Home Living:  Home Living  Type of Home: Long Term Care facility ([facility]; patient reports working with therapists however)  Prior Level of Function:  Prior Function Per Pt/Caregiver Report  Level of [place]: Needs assistance with ADLs, Needs assistance with homemaking, Needs assistance with functional transfers  Receives Help From:  (staff)  ADL Assistance: Needs assistance  Homemaking Assistance: Needs assistance  Ambulatory Assistance:  (patient reports stand pivot with staff assist to the WC, then reports that it's only sometimes, then reports mag lift; unclear tranfser assist)  Hand Dominance: Right  Precautions:  Precautions  Hearing/Visual Limitations: hearing appears WFL; patient appears to have reduced tracking R of midline, however, does cross midline to R visual field; questionable reduced visual field on R (peripheral)  Medical Precautions: Fall precautions (droplet plus precautions/COVID precautions)  Precautions Comment: L side residual weakness from CVA, LUE shoulder subluxation, RN made aware LUE needs to be elevated for joint protection  Vital Signs:  Vital Signs  Heart Rate:  (pre: 61 post: 70)  Resp:  (pre: 14 post: 19)  SpO2:  (pre: 97% post: 96%)  BP:  (pre: 93/65 MAP 74, post: 138/97 ; MAP >65 during session)  BP Location: Left arm  BP Method: Automatic    Objective   Pain:  Pain Assessment  Pain Assessment: 0-10  0-10 (Numeric) Pain Score: 0 - No pain  Cognition:  Cognition  Overall Cognitive Status: Impaired  Arousal/Alertness: Appropriate responses to stimuli  Orientation Level:  (AxO x3 however, did not know exact name of hospital)  Following Commands: Follows one step commands with repetition (90% within physical abiliites)  Cognition Comments: CAM-ICU (+)    General Assessments:        Activity Tolerance  Early  Mobility/Exercise Safety Screen: Proceed with mobilization - No exclusion criteria met    Sensation  Sensation Comment: did not report    Strength  Strength Comments: LUE and LLE weakness compared to RUE and RLE d/t hx CVA; minimal movement L shoulder flexion, elbow flexion/ext, digit flexion/ext; RUE:   3/5, elbow flexion/ext 3/5, shoulder flexion 3-/5; LLE: PF/DF <3-/5, knee ext <3-/5; RLE: PF/DF 3+/5, knee ext 3/5  Postural Control  Postural Control: Impaired  Head Control: favors mild lateral SB L; mild increase forward head posture sitting EOB  Trunk Control: favors lateral lean L in supine with HOB elevated and supported; sitting EOB: retrolateral lean L    Static Sitting Balance  Static Sitting-Balance Support: Feet unsupported (RUE supported EOB; LUE in patient's lap)  Static Sitting-Level of Assistance:  (mostly MOD-MAXx1, brief MINx1 with positioning and cueing)  Static Sitting-Comment/Number of Minutes: EOB x10 mins  Dynamic Sitting Balance  Dynamic Sitting-Balance Support: Feet unsupported, No upper extremity supported (LUE resting in patient's lap)  Dynamic Sitting-Balance:  (reaching for hand target with RUE)  Dynamic Sitting-Comments: MOD-MAXx1  Functional Assessments:  Bed Mobility  Bed Mobility: Yes  Bed Mobility 1  Bed Mobility 1: Supine to sitting, Sitting to supine  Level of Assistance 1: Dependent, Moderate verbal cues, Moderate tactile cues, +2  Bed Mobility Comments 1: HOB elevated, draw sheet    Transfers  Transfer: No    Ambulation/Gait Training  Ambulation/Gait Training Performed: No  Extremity/Trunk Assessments:  RUE   RUE :  (limited AROM globally; AAROM digit flexion/ext grossly WFL, elbow flexion/ext AAROM WFL, shoulder flexion AROM <90, AAROM shoulder flexion grossly WFL)  LUE   LUE:  (hx CVA, AAROM digit flexion/ext grossly limited, PROM elbow ext grossly WFL, PROM shoulder flexion to 90 degrees (subluxation present))  RLE   RLE :  (PROM DF lacking ~10 degrees from neutral, knee  ext to neutral via positioning, knee flexion ~80 degrees (assessed via EOB))  LLE   LLE :  (PROM DF lacking ~15 degrees from neutral, knee ext lacking ~10 degrees from neutral via positioning, knee flexion ~80 degrees (assessed via EOB))  Outcome Measures:  [facility] Basic Mobility  Turning from your back to your side while in a flat bed without using bedrails: Total  Moving from lying on your back to sitting on the side of a flat bed without using bedrails: Total  Moving to and from bed to chair (including a wheelchair): Total  Standing up from a chair using your arms (e.g. wheelchair or bedside chair): Total  To walk in hospital room: Total  Climbing 3-5 steps with railing: Total  Basic Mobility - Total Score: 6    FSS-ICU  Ambulation: Unable to attempt due to weakness  Rolling: Total assistance (performs 25% or requires another person)  Sitting: Moderate assistance (performs 50 - 74% of task)  Transfer Sit-to-Stand: Unable to perform  Transfer Supine-to-Sit: Total assistance (performs 25% or requires another person)  Total Score: 5      Early Mobility/Exercise Safety Screen: Proceed with mobilization - No exclusion criteria met  ICU Mobility Scale: Sitting over edge of bed [3]    Encounter Problems       Encounter Problems (Active)       PT Problem       Patient will complete bed mobility with MAXx1 with HOB elevated, use of bedrail and draw sheet as needed         Start:  07/24/24    Expected End:  08/14/24            Patient will complete stand-pivot transfer with MAXx2 with assist without acute LOB, while maintaining stable vitals.        Start:  07/24/24    Expected End:  08/14/24            Patient will complete static (contact guard assist x1) and dynamic (minimal assist x1) sitting balance activities using UE support as needed in order to maintain midline without acute LOB.        Start:  07/24/24    Expected End:  08/14/24            Patient will participate in BLE there-ex program in order to assist in  improving strength and to assist with the completion of functional mobility tasks.        Start:  07/24/24    Expected End:  08/14/24            Patient will sit EOB for >/=30 mins with assist as needed, while maintaining stable vitals in order to improve sitting activity tolerance.        Start:  07/24/24    Expected End:  08/14/24                            Education Documentation  Mobility Training, taught by Nisa Wang PT at 7/24/2024  2:27 PM.  Learner: Patient  Readiness: Acceptance  Method: Explanation  Response: Needs Reinforcement  Comment: mobility progression    Education Comments  No comments found.      Nisa Wang, CHERIE, DPT

## 2024-07-24 NOTE — PROGRESS NOTES
Cardiology  Progress Note     Subjective/ Interval Events  Patient continuing to tolerate nasal cannula, denies chest pain     Objective      7/24/2024     5:00 AM 7/24/2024     6:00 AM 7/24/2024     7:00 AM 7/24/2024     8:00 AM 7/24/2024     9:00 AM 7/24/2024    10:00 AM 7/24/2024    11:00 AM   Vitals   Systolic 104 80 85 101 92 109 79   Diastolic 61 59 62 65 65 69 55   Heart Rate 59 61 59 60 61 60 68   Temp 36.4 °C (97.5 °F) 36.4 °C (97.5 °F) 36.4 °C (97.5 °F) 36.3 °C (97.3 °F) 36.3 °C (97.3 °F) 36.5 °C (97.7 °F) 36.5 °C (97.7 °F)   Resp 16 16 17 15 19 19 20       Scheduled medications   Medication Dose Route Frequency    aspirin  81 mg oral Daily    atorvastatin  40 mg oral Nightly    insulin glargine  10 Units subcutaneous Nightly    insulin lispro  0-10 Units subcutaneous q6h    lactulose  20 g oral TID    midodrine  15 mg oral TID    oxygen   inhalation Continuous - Inhalation    perflutren lipid microspheres  0.5-10 mL of dilution intravenous Once in imaging    perflutren protein A microsphere  0.5 mL intravenous Once in imaging    polyethylene glycol  17 g oral BID    senna  5 mL oral Nightly    sulfur hexafluoride microsphr  2 mL intravenous Once in imaging        Physical Exam  Constitutional:       Appearance: Normal appearance.   HENT:      Head: Normocephalic and atraumatic.   Eyes:      General: No scleral icterus.  Cardiovascular:      Rate and Rhythm: Normal rate and regular rhythm.      Pulses: Normal pulses.   Pulmonary:      Effort: No respiratory distress.      Comments: On nasal cannula   Abdominal:      General: There is no distension.   Musculoskeletal:      Right lower leg: No edema.      Left lower leg: No edema.   Skin:     Findings: No lesion or rash.   Neurological:      Mental Status: She is alert and oriented to person, place, and time.           My Interpretation of Reviewed Study(s):  TTE 7/22/2024  CONCLUSIONS:   1. The left ventricular systolic function is normal, with a visually  estimated ejection fraction of 70-75%.   2. Spectral Doppler shows an impaired relaxation pattern of left ventricular diastolic filling.   3. There is no evidence of left ventricular hypertrophy.   4. There is normal right ventricular global systolic function.  Prior ECGs (reviewed and my interpretation):   Encounter Date: 07/21/24   Electrocardiogram, 12-lead PRN ACS symptoms   Result Value    Ventricular Rate 88    Atrial Rate 88    WA Interval 158    QRS Duration 118    QT Interval 378    QTC Calculation(Bazett) 457    P Axis 71    R Axis 20    T Axis 55    QRS Count 14    Q Onset 229    P Onset 150    P Offset 211    T Offset 418    QTC Fredericia 429    Narrative    Normal sinus rhythm  Nonspecific intraventricular conduction delay  Nonspecific ST and T wave abnormality  Abnormal ECG  When compared with ECG of 22-JUL-2024 00:22,  Sinus rhythm has replaced Junctional rhythm  ST no longer depressed in Inferior leads  Non-specific change in ST segment in Anterior leads  ST less depressed in Lateral leads  Confirmed by Aris Davila ([id number]) on 7/23/2024 5:22:40 PM        Assessment/Plan   Caryl Tucker  is a 52 y/o F w/ a PMH of CVA c/b left hemiparesis and dysphagia (2016) s/p PEG placement (2016, replaced 5 times, removed unknown date), HTN, PE/DVT on Eliquis who was sent to the ED by [facility] for evaluation of mental status.   She was found to have an ZULMA with hyperkalemia, hyperglycemia, glucose greater than 1400, and the CT concerning for proctitis, she was treated with antibiotics, started with insulin drip, required Levophed briefly, and was intubated for airway protection.   Around 11:52 PM, patient was noted to go into pulseless V-fib arrest, per telemetry review patient had increasing episodes of NSVT and bigeminy prior to this arrest. The patient required 3 shocks. She received epi x 3, amiodarone 300mg, calcium x 1 , HCO3 x 1 during the code. She  intermittently had ROSC in between  episodes.      Impression   #Pulseless V fib arrest   #Concern for type 1 MI vs type 2   -Patient had increasing episodes of NSVT and bigeminy prior to this arrest highly suggestive of electrolyte imbalance in the setting of HHS and K 3.2 Mg 1.96. It is likely that her true potassium level was lower (6.1 on presentation)   - troponin peaked at 955, and formal TTE preserved EF with no concerning wall motion abnormalities   - given this less likely ischemic event, though cannot rule out ischemic cause completely   Recommendations   - will need angiogram prior to discharge   - would not start amio as underlying electrolyte abnormalities are being corrected   - heparin gtt for total 48 hours from event, start asa 81 mg daily, high dose statin           -Cardiology will follow. Please page CICU fellow overnight [phone] with any change in clinical status or questions .      Code Status:  Full Code     I spent 30 minutes in the professional and overall care of this patient.        Yvette Matute MD   PGY5 Cardiology Fellow   Pager [phone]

## 2024-07-24 NOTE — PROGRESS NOTES
Vancomycin Dosing by Pharmacy- Cessation of Therapy    Consult to pharmacy for vancomycin dosing has been discontinued by the prescriber, pharmacy will sign off at this time.    Please call pharmacy if there are further questions or re-enter a consult if vancomycin is resumed.     Olivia Parada, PharmD

## 2024-07-24 NOTE — PROGRESS NOTES
Occupational Therapy    Evaluation    Patient Name: Caryl Tucker  MRN: [id number]  Today's Date: 7/24/2024  Room: 17/17  Time Calculation  Start Time: 1236  Stop Time: 1259  Time Calculation (min): 23 min    Assessment  IP OT Assessment  OT Assessment: Pt is a 54yo female presenting with deficits in ADLs, IADLs, transfers, functional activity tolerance, bed mobility and cognition. pt would benefit from skilled OT intervention to return to PLOF  Prognosis: Good  Barriers to Discharge: None  Evaluation/Treatment Tolerance: Patient limited by fatigue  Medical Staff Made Aware: Yes  End of Session Communication: Bedside nurse  End of Session Patient Position: Bed, 3 rail up, Alarm off, not on at start of session  Plan:  Inpatient Plan  Treatment Interventions: ADL retraining, Functional transfer training, UE strengthening/ROM, Endurance training, Patient/family training, Neuromuscular reeducation, Compensatory technique education  OT Frequency: 2 times per week  OT Discharge Recommendations: Moderate intensity level of continued care  OT Recommended Transfer Status: Assist of 2  OT - OK to Discharge: Yes  OT Assessment  OT Assessment Results: Decreased ADL status, Decreased cognition, Decreased safe judgment during ADL, Decreased functional mobility, Decreased gross motor control, Decreased IADLs, Decreased upper extremity strength  Prognosis: Good  Barriers to Discharge: None  Evaluation/Treatment Tolerance: Patient limited by fatigue  Medical Staff Made Aware: Yes  Strengths: Support of Caregivers  Barriers to Participation: Comorbidities, Capable of completing ADLs semi/independent, Insight into problems, Premorbid level of function    Subjective   Current Problem:  1. Rash and nonspecific skin eruption  Referral to Primary Care    Referral to Rheumatology    Referral to Dermatology      2. Altered mental status, unspecified altered mental status type        3. Pneumonia of left lower lobe due to infectious organism         4. Acute on chronic respiratory failure with hypoxia and hypercapnia (Multi)  CANCELED: Transthoracic Echo (TTE) Complete    CANCELED: Transthoracic Echo (TTE) Complete      5. Hyperglycemic hyperosmolar nonketotic coma (Multi)        6. Septic shock (Multi)  Transthoracic Echo (TTE) Complete    Transthoracic Echo (TTE) Complete    Transthoracic Echo (TTE) Limited    Transthoracic Echo (TTE) Limited      7. ZULMA (acute kidney injury) (CMS-HCC)        8. Shock, unspecified (Multi)  Transthoracic Echo (TTE) Complete        General:  Reason for Referral: from nursing home, [facility]-> patient hypoxic in the 80s on room air, tachycardic, hypotensive, hyperglycemic to the 400s, and unable to answer questions with drool pooling in her mouth ; acute hypercapnic respiratory failure requiring intubation as well as encephalopathy, likely secondary to hyperglycemia and hypernatremia. Now extubated; also COVID (+)  Past Medical History Relevant to Rehab: CVA c/b left hemiparesis and dysphagia (2016) s/p PEG placement (2016, replaced 5 times, removed unknown date), HTN, PE/DVT on Eliquis  Co-Treatment: PT  Co-Treatment Reason: requires x2 skilled assist for all activity  Prior to Session Communication: Bedside nurse  Patient Position Received: Bed, 3 rail up, Alarm off, not on at start of session  Family/Caregiver Present: No  General Comment: Pt pleasant and willing to participate in therapy   Precautions:  Medical Precautions: Fall precautions (droplet plus precautions/COVID precautions)  Precautions Comment: L shoulder sublux, RN aware  Vital Signs:  Heart Rate: 80 (81 post)  Resp: 20 (21 post)  SpO2: 99 % (100 post)  BP: (!) 114/41 (132/42 post)  MAP (mmHg): 66 (72 post)  Pain:  Pain Assessment  Pain Assessment: 0-10  0-10 (Numeric) Pain Score: 0 - No pain  Lines/Tubes/Drains:  CVC 07/22/24 Triple lumen Internal jugular (Active)   Number of days: 2       Urethral Catheter Straight-tip 16 Fr. (Active)   Number of days: 3        NG/OG/Feeding Tube Right nostril (Active)   Number of days: 0         Objective   Cognition:  Overall Cognitive Status: Impaired  Arousal/Alertness: Appropriate responses to stimuli  Orientation Level:  (A+Ox3, knows she is in hospital in [place])  Following Commands: Follows one step commands with repetition (and increased time)  Insight: Mild  Processing Speed: Delayed           Home Living:  Type of Home: Long Term Care facility ([facility]; pt reports working with therapy)   Prior Function:  Level of [place]: Needs assistance with ADLs, Needs assistance with homemaking, Needs assistance with functional transfers  Receives Help From:  (staff)  ADL Assistance: Needs assistance  Homemaking Assistance: Needs assistance  Ambulatory Assistance: Needs assistance (pt questionable historian, reports occasional SPT to W/C, also reports occasional mag lift)  Hand Dominance: Right  Prior Function Comments: pt quesitonable historian  IADL History:     ADL:  Eating Assistance: Moderate (anticipated)  Grooming Assistance: Moderate (anticipated)  Bathing Assistance: Moderate (anticipated)  UE Dressing Assistance: Moderate (anticipated)  LE Dressing Assistance: Moderate (anticipated)  Toileting Assistance with Device: Total (anticipated)  Activity Tolerance:  Endurance: Tolerates 10 - 20 min exercise with multiple rests  Early Mobility/Exercise Safety Screen: Proceed with mobilization - No exclusion criteria met  Balance:     Bed Mobility/Transfers: Bed Mobility  Bed Mobility: Yes  Bed Mobility 1  Bed Mobility 1: Supine to sitting, Sitting to supine  Level of Assistance 1: Dependent, Moderate verbal cues, Moderate tactile cues, +2  Bed Mobility Comments 1: HOB elevated, use of draw sheet, step by step cues   and Transfers  Transfer: No  IADL's:      Vision:     and Vision - Complex Assessment  Tracking:  (difficulty tracking either eye to R side)  Sensation:  Sensation Comment: did not report  Strength:  Strength  Comments: LUE strength promixmal > distal, no greater than 3-/5. RUE  3+5, RUE elbow flexion 3/5 shoulder flexion 3/5  Perception:  Inattention/Neglect: Cues to attend right visual field  Coordination:  Movements are Fluid and Coordinated: No (baseline deficits)   Hand Function:     Extremities: RUE   RUE :  (limited AROM globally; AAROM digit flexion/ext grossly WFL, elbow flexion/ext AAROM WFL, shoulder flexion AROM <90, AAROM shoulder flexion grossly WFL), LUE   LUE:  (hx CVA, AAROM digit flexion/ext grossly limited, PROM elbow ext grossly WFL, PROM shoulder flexion to 90 degrees (subluxation present)), RLE   RLE :  (PROM DF lacking ~10 degrees from neutral, knee ext to neutral via positioning, knee flexion ~80 degrees (assessed via EOB)), and LLE   LLE :  (PROM DF lacking ~15 degrees from neutral, knee ext lacking ~10 degrees from neutral via positioning, knee flexion ~80 degrees (assessed via EOB))    Outcome Measures: [facility] Daily Activity  Putting on and taking off regular lower body clothing: A lot  Bathing (including washing, rinsing, drying): A lot  Putting on and taking off regular upper body clothing: A lot  Toileting, which includes using toilet, bedpan or urinal: Total  Taking care of personal grooming such as brushing teeth: A lot  Eating Meals: A lot  Daily Activity - Total Score: 11    Confusion Assessment Method-ICU (CAM-ICU)  Feature 1: Acute Onset or Fluctuating Course: Positive  Feature 2: Inattention: Positive  Feature 4: Disorganized Thinking: Positive  Overall CAM-ICU: Positive   ICU Mobility Screen  Early Mobility/Exercise Safety Screen: Proceed with mobilization - No exclusion criteria met  ICU Mobility Scale: Sitting over edge of bed,          Education Documentation  Body Mechanics, taught by Suzi Delgado OT at 7/24/2024  3:47 PM.  Learner: Patient  Readiness: Acceptance  Method: Explanation  Response: Needs Reinforcement    Precautions, taught by Suzi Delgado OT at 7/24/2024   3:47 PM.  Learner: Patient  Readiness: Acceptance  Method: Explanation  Response: Needs Reinforcement    ADL Training, taught by Suzi Delgado OT at 7/24/2024  3:47 PM.  Learner: Patient  Readiness: Acceptance  Method: Explanation  Response: Needs Reinforcement    Education Comments  No comments found.        Goals:     Encounter Problems       Encounter Problems (Active)       ADLs       Patient with complete upper body dressing with minimal assist  level of assistance donning and doffing all UE clothes while supported sitting (Progressing)       Start:  07/24/24    Expected End:  08/14/24            Patient will complete daily grooming tasks  with set-up level of assistance and PRN adaptive equipment while supported sitting. (Progressing)       Start:  07/24/24    Expected End:  08/14/24               COGNITION/SAFETY       Patient will score WFL on standardized cognitive assessment (CAM-) (Progressing)       Start:  07/24/24    Expected End:  08/14/24               TRANSFERS       Patient will perform bed mobility maximal assist level of assistance and bed rails in order to improve safety and independence with mobility (Progressing)       Start:  07/24/24    Expected End:  08/14/24            Patient will complete functional transfer to Bsc/Chair  with least restrictive device with maximum assist x2 level of assistance. (Progressing)       Start:  07/24/24    Expected End:  08/14/24 07/24/24 at 3:47 PM   Suzi Delgaod OT   Rehab Office: [phone]

## 2024-07-24 NOTE — SIGNIFICANT EVENT
Patient was manually disimpacted. Patient verbally consented, witnessed by Nurse Gu. Soft stool was manually removed from the rectum. Nurse Riccardo was present during the disimpaction.

## 2024-07-25 ENCOUNTER — APPOINTMENT (OUTPATIENT)
Dept: RADIOLOGY | Facility: HOSPITAL | Age: 54
End: 2024-07-25
Payer: MEDICARE

## 2024-07-25 ENCOUNTER — APPOINTMENT (OUTPATIENT)
Dept: RADIOLOGY | Facility: HOSPITAL | Age: 54
DRG: 208 | End: 2024-07-25
Payer: MEDICARE

## 2024-07-25 LAB
ALBUMIN SERPL BCP-MCNC: 2.6 G/DL (ref 3.4–5)
ALP SERPL-CCNC: 105 U/L (ref 33–110)
ALT SERPL W P-5'-P-CCNC: 33 U/L (ref 7–45)
ANION GAP BLDMV CALCULATED.4IONS-SCNC: 6 MMO/L (ref 10–25)
ANION GAP BLDMV CALCULATED.4IONS-SCNC: 6 MMO/L (ref 10–25)
ANION GAP SERPL CALC-SCNC: 11 MMOL/L (ref 10–20)
APPEARANCE UR: CLEAR
AST SERPL W P-5'-P-CCNC: 78 U/L (ref 9–39)
BACTERIA BLD AEROBE CULT: ABNORMAL
BACTERIA BLD CULT: ABNORMAL
BACTERIA BLD CULT: NORMAL
BASE EXCESS BLDMV CALC-SCNC: 4.4 MMOL/L (ref -2–3)
BASE EXCESS BLDMV CALC-SCNC: 4.7 MMOL/L (ref -2–3)
BASOPHILS # BLD MANUAL: 0.19 X10*3/UL (ref 0–0.1)
BASOPHILS NFR BLD MANUAL: 2.6 %
BILIRUB SERPL-MCNC: 0.8 MG/DL (ref 0–1.2)
BILIRUB UR STRIP.AUTO-MCNC: NEGATIVE MG/DL
BODY TEMPERATURE: 37 DEGREES CELSIUS
BODY TEMPERATURE: 37 DEGREES CELSIUS
BUN SERPL-MCNC: 21 MG/DL (ref 6–23)
CA-I BLDMV-SCNC: 1.18 MMOL/L (ref 1.1–1.33)
CA-I BLDMV-SCNC: 1.19 MMOL/L (ref 1.1–1.33)
CALCIUM SERPL-MCNC: 8.2 MG/DL (ref 8.6–10.6)
CHLORIDE BLD-SCNC: 108 MMOL/L (ref 98–107)
CHLORIDE BLD-SCNC: 109 MMOL/L (ref 98–107)
CHLORIDE SERPL-SCNC: 108 MMOL/L (ref 98–107)
CO2 SERPL-SCNC: 29 MMOL/L (ref 21–32)
COLOR UR: YELLOW
CREAT SERPL-MCNC: 1.14 MG/DL (ref 0.5–1.05)
EGFRCR SERPLBLD CKD-EPI 2021: 57 ML/MIN/1.73M*2
EOSINOPHIL # BLD MANUAL: 0.39 X10*3/UL (ref 0–0.7)
EOSINOPHIL NFR BLD MANUAL: 5.3 %
ERYTHROCYTE [DISTWIDTH] IN BLOOD BY AUTOMATED COUNT: 17.5 % (ref 11.5–14.5)
GLUCOSE BLD MANUAL STRIP-MCNC: 162 MG/DL (ref 74–99)
GLUCOSE BLD MANUAL STRIP-MCNC: 165 MG/DL (ref 74–99)
GLUCOSE BLD MANUAL STRIP-MCNC: 171 MG/DL (ref 74–99)
GLUCOSE BLD MANUAL STRIP-MCNC: 179 MG/DL (ref 74–99)
GLUCOSE BLD MANUAL STRIP-MCNC: 200 MG/DL (ref 74–99)
GLUCOSE BLD MANUAL STRIP-MCNC: 207 MG/DL (ref 74–99)
GLUCOSE BLD MANUAL STRIP-MCNC: 217 MG/DL (ref 74–99)
GLUCOSE BLD-MCNC: 165 MG/DL (ref 74–99)
GLUCOSE BLD-MCNC: 224 MG/DL (ref 74–99)
GLUCOSE SERPL-MCNC: 164 MG/DL (ref 74–99)
GLUCOSE UR STRIP.AUTO-MCNC: ABNORMAL MG/DL
GRAM STN SPEC: ABNORMAL
HCO3 BLDMV-SCNC: 30.3 MMOL/L (ref 22–26)
HCO3 BLDMV-SCNC: 31.5 MMOL/L (ref 22–26)
HCT VFR BLD AUTO: 33.2 % (ref 36–46)
HCT VFR BLD EST: 33 % (ref 36–46)
HCT VFR BLD EST: 34 % (ref 36–46)
HGB BLD-MCNC: 10.3 G/DL (ref 12–16)
HGB BLDMV-MCNC: 11 G/DL (ref 12–16)
HGB BLDMV-MCNC: 11.2 G/DL (ref 12–16)
IMM GRANULOCYTES # BLD AUTO: 0.04 X10*3/UL (ref 0–0.7)
IMM GRANULOCYTES NFR BLD AUTO: 0.6 % (ref 0–0.9)
INHALED O2 CONCENTRATION: 21 %
INHALED O2 CONCENTRATION: 23 %
KETONES UR STRIP.AUTO-MCNC: ABNORMAL MG/DL
LACTATE BLDMV-SCNC: 1 MMOL/L (ref 0.4–2)
LACTATE BLDMV-SCNC: 1 MMOL/L (ref 0.4–2)
LEUKOCYTE ESTERASE UR QL STRIP.AUTO: ABNORMAL
LYMPHOCYTES # BLD MANUAL: 1.36 X10*3/UL (ref 1.2–4.8)
LYMPHOCYTES NFR BLD MANUAL: 18.6 %
MAGNESIUM SERPL-MCNC: 1.94 MG/DL (ref 1.6–2.4)
MCH RBC QN AUTO: 26.8 PG (ref 26–34)
MCHC RBC AUTO-ENTMCNC: 31 G/DL (ref 32–36)
MCV RBC AUTO: 87 FL (ref 80–100)
MONOCYTES # BLD MANUAL: 0.19 X10*3/UL (ref 0.1–1)
MONOCYTES NFR BLD MANUAL: 2.6 %
MUCOUS THREADS #/AREA URNS AUTO: ABNORMAL /LPF
NEUTROPHILS # BLD MANUAL: 5.04 X10*3/UL (ref 1.2–7.7)
NEUTS BAND # BLD MANUAL: 0.13 X10*3/UL (ref 0–0.7)
NEUTS BAND NFR BLD MANUAL: 1.8 %
NEUTS SEG # BLD MANUAL: 4.91 X10*3/UL (ref 1.2–7)
NEUTS SEG NFR BLD MANUAL: 67.3 %
NITRITE UR QL STRIP.AUTO: NEGATIVE
NRBC BLD-RTO: 0.8 /100 WBCS (ref 0–0)
OXYHGB MFR BLDMV: 68.8 % (ref 45–75)
OXYHGB MFR BLDMV: 73.9 % (ref 45–75)
PCO2 BLDMV: 50 MM HG (ref 41–51)
PCO2 BLDMV: 57 MM HG (ref 41–51)
PH BLDMV: 7.35 PH (ref 7.33–7.43)
PH BLDMV: 7.39 PH (ref 7.33–7.43)
PH UR STRIP.AUTO: 5.5 [PH]
PHOSPHATE SERPL-MCNC: 2.5 MG/DL (ref 2.5–4.9)
PLATELET # BLD AUTO: 93 X10*3/UL (ref 150–450)
PO2 BLDMV: 45 MM HG (ref 35–45)
PO2 BLDMV: 51 MM HG (ref 35–45)
POTASSIUM BLDMV-SCNC: 3.6 MMOL/L (ref 3.5–5.3)
POTASSIUM BLDMV-SCNC: 4.2 MMOL/L (ref 3.5–5.3)
POTASSIUM SERPL-SCNC: 3.6 MMOL/L (ref 3.5–5.3)
PROCALCITONIN SERPL-MCNC: 0.36 NG/ML
PROT SERPL-MCNC: 5.6 G/DL (ref 6.4–8.2)
PROT UR STRIP.AUTO-MCNC: ABNORMAL MG/DL
RBC # BLD AUTO: 3.84 X10*6/UL (ref 4–5.2)
RBC # UR STRIP.AUTO: ABNORMAL /UL
RBC #/AREA URNS AUTO: >20 /HPF
RBC MORPH BLD: ABNORMAL
SAO2 % BLDMV: 71 % (ref 45–75)
SAO2 % BLDMV: 76 % (ref 45–75)
SODIUM BLDMV-SCNC: 140 MMOL/L (ref 136–145)
SODIUM BLDMV-SCNC: 143 MMOL/L (ref 136–145)
SODIUM SERPL-SCNC: 144 MMOL/L (ref 136–145)
SP GR UR STRIP.AUTO: 1.02
SQUAMOUS #/AREA URNS AUTO: ABNORMAL /HPF
TOTAL CELLS COUNTED BLD: 113
UFH PPP CHRO-ACNC: 0.3 IU/ML
UROBILINOGEN UR STRIP.AUTO-MCNC: NORMAL MG/DL
VARIANT LYMPHS # BLD MANUAL: 0.13 X10*3/UL (ref 0–0.5)
VARIANT LYMPHS NFR BLD: 1.8 %
WBC # BLD AUTO: 7.3 X10*3/UL (ref 4.4–11.3)
WBC #/AREA URNS AUTO: ABNORMAL /HPF

## 2024-07-25 PROCEDURE — 81001 URINALYSIS AUTO W/SCOPE: CPT

## 2024-07-25 PROCEDURE — 84132 ASSAY OF SERUM POTASSIUM: CPT | Performed by: INTERNAL MEDICINE

## 2024-07-25 PROCEDURE — 2500000001 HC RX 250 WO HCPCS SELF ADMINISTERED DRUGS (ALT 637 FOR MEDICARE OP)

## 2024-07-25 PROCEDURE — 84145 PROCALCITONIN (PCT): CPT

## 2024-07-25 PROCEDURE — 37799 UNLISTED PX VASCULAR SURGERY: CPT | Performed by: INTERNAL MEDICINE

## 2024-07-25 PROCEDURE — 84100 ASSAY OF PHOSPHORUS: CPT

## 2024-07-25 PROCEDURE — 2500000004 HC RX 250 GENERAL PHARMACY W/ HCPCS (ALT 636 FOR OP/ED)

## 2024-07-25 PROCEDURE — 87040 BLOOD CULTURE FOR BACTERIA: CPT

## 2024-07-25 PROCEDURE — 74018 RADEX ABDOMEN 1 VIEW: CPT | Performed by: RADIOLOGY

## 2024-07-25 PROCEDURE — 37799 UNLISTED PX VASCULAR SURGERY: CPT

## 2024-07-25 PROCEDURE — 1200000002 HC GENERAL ROOM WITH TELEMETRY DAILY

## 2024-07-25 PROCEDURE — 82947 ASSAY GLUCOSE BLOOD QUANT: CPT

## 2024-07-25 PROCEDURE — 36415 COLL VENOUS BLD VENIPUNCTURE: CPT

## 2024-07-25 PROCEDURE — 99233 SBSQ HOSP IP/OBS HIGH 50: CPT | Performed by: INTERNAL MEDICINE

## 2024-07-25 PROCEDURE — 2500000005 HC RX 250 GENERAL PHARMACY W/O HCPCS

## 2024-07-25 PROCEDURE — 84132 ASSAY OF SERUM POTASSIUM: CPT

## 2024-07-25 PROCEDURE — 71045 X-RAY EXAM CHEST 1 VIEW: CPT

## 2024-07-25 PROCEDURE — 2500000002 HC RX 250 W HCPCS SELF ADMINISTERED DRUGS (ALT 637 FOR MEDICARE OP, ALT 636 FOR OP/ED)

## 2024-07-25 PROCEDURE — 87086 URINE CULTURE/COLONY COUNT: CPT

## 2024-07-25 PROCEDURE — 85007 BL SMEAR W/DIFF WBC COUNT: CPT

## 2024-07-25 PROCEDURE — 85520 HEPARIN ASSAY: CPT

## 2024-07-25 PROCEDURE — 71045 X-RAY EXAM CHEST 1 VIEW: CPT | Performed by: STUDENT IN AN ORGANIZED HEALTH CARE EDUCATION/TRAINING PROGRAM

## 2024-07-25 PROCEDURE — 83735 ASSAY OF MAGNESIUM: CPT

## 2024-07-25 PROCEDURE — 74018 RADEX ABDOMEN 1 VIEW: CPT

## 2024-07-25 PROCEDURE — 85027 COMPLETE CBC AUTOMATED: CPT

## 2024-07-25 RX ORDER — MIDODRINE HYDROCHLORIDE 10 MG/1
10 TABLET ORAL EVERY 8 HOURS
Status: DISCONTINUED | OUTPATIENT
Start: 2024-07-25 | End: 2024-07-28

## 2024-07-25 RX ORDER — METRONIDAZOLE 500 MG/100ML
500 INJECTION, SOLUTION INTRAVENOUS EVERY 8 HOURS
Status: DISCONTINUED | OUTPATIENT
Start: 2024-07-25 | End: 2024-07-28

## 2024-07-25 RX ORDER — POLYETHYLENE GLYCOL 3350 17 G/17G
17 POWDER, FOR SOLUTION ORAL DAILY
Status: DISCONTINUED | OUTPATIENT
Start: 2024-07-26 | End: 2024-08-06 | Stop reason: HOSPADM

## 2024-07-25 RX ORDER — POTASSIUM CHLORIDE 1.5 G/1.58G
40 POWDER, FOR SOLUTION ORAL ONCE
Status: COMPLETED | OUTPATIENT
Start: 2024-07-25 | End: 2024-07-25

## 2024-07-25 RX ORDER — POTASSIUM CHLORIDE 1.5 G/1.58G
20 POWDER, FOR SOLUTION ORAL ONCE
Status: DISCONTINUED | OUTPATIENT
Start: 2024-07-25 | End: 2024-07-25

## 2024-07-25 ASSESSMENT — PAIN SCALES - GENERAL
PAINLEVEL_OUTOF10: 0 - NO PAIN

## 2024-07-25 ASSESSMENT — PAIN - FUNCTIONAL ASSESSMENT
PAIN_FUNCTIONAL_ASSESSMENT: 0-10

## 2024-07-25 NOTE — CARE PLAN
Problem: Skin  Goal: Participates in plan/prevention/treatment measures  Outcome: Progressing  Flowsheets (Taken 7/24/2024 2152)  Participates in plan/prevention/treatment measures: Elevate heels  Goal: Prevent/manage excess moisture  Outcome: Progressing  Flowsheets (Taken 7/24/2024 2152)  Prevent/manage excess moisture:   Cleanse incontinence/protect with barrier cream   Moisturize dry skin  Goal: Prevent/minimize sheer/friction injuries  Outcome: Progressing  Flowsheets (Taken 7/24/2024 2152)  Prevent/minimize sheer/friction injuries:   HOB 30 degrees or less   Turn/reposition every 2 hours/use positioning/transfer devices  Goal: Promote/optimize nutrition  Outcome: Progressing  Flowsheets (Taken 7/24/2024 2152)  Promote/optimize nutrition: Discuss with provider if NPO > 2 days  Goal: Promote skin healing  Outcome: Progressing  Flowsheets (Taken 7/24/2024 2152)  Promote skin healing: Turn/reposition every 2 hours/use positioning/transfer devices

## 2024-07-25 NOTE — PROGRESS NOTES
ICU to [facility] Transfer Summary     I:  ICU Admission Reason & Brief ICU Course:    Caryl Tucker is a 53 y.o. year old female patient w/ a PMH significant for PMH of CVA c/b left hemiparesis and dysphagia, HTN, PE/DVT on Eliquis, who was admitted to the MICU on 07/21/24 for acute hypercapnic respiratory failure requiring intubation, encephalopathy, hyperglycemia, and hypernatremia. Vfib arrest occurred 7/22- ROSC achieved. Patient currently off pressers, sedation, insulin drip, and extubated.   C: Code Status/DPOA Info/Goals of Care/ACP Note    Full Code  DPOA/Contact Number: Radha Osullivan (POA) - [phone] - Aunt   U: Unprescribing & Pertinent High-Risk Medications    Changes to home meds: Patient begun on Insulin inpatient. As well, holding home hypotensives and placed patient on Midodrine for hypotension inpatient     Anticoagulation: Yes - Therapeutic Heparin    Antibiotics:   [x] N/A - no current planned antimicrobioals  P: Pending Tests at the Time of Transfer   None  A: Active consultants, including Rehab:   [x]  Subspecialty Consultants:  cardiology, nephrology , endocrinology  [x]  PT  [x]  OT  [x]  SLP  U: Uncertainty Measure/Diagnostic Pause:    Working diagnosis at the time of transfer Resolved HHS secondary to new onset diabetes, resolved shock      Diagnosis Degree of Certainty: 1. High degree of certainty about the clinical diagnosis.     S: Summary of Major Problems and To-Dos:   NEUROLOGY/PSYCH:  #Encephalopathy, likely metabolic, secondary to hypernatremia vs hyperglycemia- resolved  #H/o CVA causing hemiplegia, hemiparesis, dysphagia, and dysarthria 2016  -Baseline AAO*3, on presentation altered  -7/21 on admission: Na 150, corrected 171, Glucose >1400   -Admission Urine Drug Screen: Normal  -7/21 CT head: minimal cortical volume loss without hydrocephalus, hemorrhage, or extra-axial collection  -7/22 CT head: no intracranial hemorrhage  -7/23: Intubated, not sedated. Responding to commands, able  to move all extremities. Likely no additional neurologic changes due to cardiac arrest 7/22 due to consistent physical exam.  -7/24: Awake, oriented *3, lethargic  Plan:  -Encephalopathy resolved. Will monitor for changes in mental status.      CARDIOVASCULAR:  #Shock, requiring pressors, ddx: cardiovascular secondary to vfib arrest vs hypovolemic secondary to HHS  #S/P Vfib arrest 7/22, likely secondary to hypokalemia vs less likely ischemic event  #Elevated troponin s/p arrest, improving  -Home medications: HCTZ 12.5mg, Lisinopril 20mg   -Admission 7/21: Troponin 28, BNP 6  -Admission 7/21: TTE: preserved EF with no concerning wall motion abnormalities  -7/22: V.fib cardiac arrest. Required 3 shocks, epi x 3, amiodarone 300mg, calcium x 1 , HCO3 x 1 during the code. Patient achieved ROSC and was following commands   -S/P v.fib cardiac arrest 7/22- Trop: 304 ->955 -> 709  Plan:  -Elevated lactate on presentation. Now wnl.  -Cardiology consulted  -7/23 began ASA 81mg daily, Atorvastatin 40 secondary to possible ischemic event  7/24: Discontinued Pressers  7/25: Pressors were intermittently needed overnight. Discontinued this morning.  Plan:  -Holding home medications in setting of ZULMA  -Per Cardiology recommendations, will angiogram prior to discharge   -DVT management per endocrinology section  -Beginning Midodrine today     PULMONARY:  #Acute Hypoxic Hypercapnic Respiratory Failure, improving  #Suspected chronic CO2 retainer, at baseline  -Admission 7/21:  Bicarbonate 35  -7/21 intubated on admission  -7/23: Extubated  -7/25: No oxygen requirement, at baseline bicarbonate  Plan:  -Monitoring post extubation with daily VBGs and vitals.     RENAL/GENITOURINARY:  #ZULMA, improved, near baseline  -2021: BUN 20s, Cr 0.85,   -On admission: BUN 80, Cr 3.57, AG 19, Egfr 14  -7/24: Improving labs: BUN 21, Cr: 1.14, Egfr 57  Plan:  -Nephrology consulted  -Avoiding nephrotoxic medications  -For fluid management, see  Endocrinology     #Hypernatremia, improved  -On admission 7/21: 150 -> 7/22 160 -> 7/23 151 -> 7/25 144  Plan  -Daily RFP  -See endocrinology for additional workup of HHS     #Hyperkalemia- improved  -On admission 7/21: K 6.1. Given 2g Calcium Gluconate in ED  -7/22 - K dropped to 3.1- labs reflecting time patient coded. Aggressively repleted K.  -7/22: K 3.7 -> 7/23 4.5 -> 7/24 4.2 -> 7/25 3.6  Plan  -Daily RFP labs  -Maintaining goal K > 4  -Will replete PRN     #Hypermagnesemia  -On admission 7/21: Mg 2.93 -> 7/24: 2.46 -> 7/25 1.94  Plan  -Daily Mg labs  -Maintaining >2, <2.4  -Will replete Mg PRN     #Hypercalcemia, POA- resolved  #Hypocalcemia in setting of Hypoalbuminemia, corrected to Normal Ca  -Admission 7/21: Ca 10.7, + Protein Gap: Total Protein 8.7, Albumin 3.9.   -Original concern for multiple myeloma. SPEP: Gamma spike. UPEP: Mild proteinuria  -Calcium since 7/22-23: WNL -> 7/24: 8.0, corrected 8.6 -> 8.2  -7/22: Albumin 3.0 -> 7/23 3.9 -> 7/24 2.6 -> 7/25 2.6  SPEP: Gamma spike. UPEP: Mild proteinuria  Plan  -Daily RFP labs   -Will replete Ca PRN     GASTROENTEROLOGY:  #H/o Gastrostomy Tube  #Concern Fecal Impaction on Imaging- improved  -Admission: No PEG tube in place, healed abdominal scar noted.  -7/21 CT CAP: markedly distended rectum with mild thickening of the lower rectum and anorectal junction with large stool ball and mesorectal fat stranding, likely representing fecal impaction with stercoral proctitis  -Home medication: On Senna   -7/21: received 1 enema. Did not tolerate subsequent enemas. Unable to maintain fluid in rectum for efficient use.  -No bowel movement since 7/22 during code. Passing gas.  -7/23 SLP Eval s/p extubation. Patient recommended to be strict NPO with aggressive oral care.  -7/24: Manual fecal disimpaction. Multiple bowel movements after  -7/25: Overnight, only watery stool noted by nurse. F/U 7/25 KUB demonstrated nonobstructive bowel-gas pattern. Adjusted bowel  regimen.  Plan:  -Bowel regimen: senna/docusate, Miralax daily (Lactulose discontinued)  -Nutrition following for tube feed recommendations     ENDOCRINOLOGY:  #HHS in the setting of hyperglycemia and hypernatremia- resolved  -Admission 7/21: Glucose 1,498, Na 150  -Admission Relevant labs: Beta Hydroxybutyrate 0.24, TSH 3.43, Lactate 3.5, HA1C: 12.1  -Urine Osmolality: 455 (nl)  -Urine Electrolytes:  Na 91, K 37, Cr 23.9, Chloride 108; abnormal: chloride/Cr ratio: 452  -[facility] Protocol Insulin: Drip begun in ED, held 7/22 in setting of hypokalemia, resumed 7/23 and titrated down, discontinued 7/23. Current management: Lantus 10 and Moderate ISS  -[facility] Protocol Fluids: Current management includes 0.45% NaCl and free water flushes. Osmolar gap closed, discontinued D5W  -Trending glucose and Na: 7/23: Glucose 147, Na 145  -Trending Serum Osmolality: 7/21 416 -> 369 -> 344 -> 7/24 319  -Consulted nephrology, following recommendations  Plan:  -Daily RFP  -Continuing Lantus 10 and Moderate ISS  -Fluids: free water flushes  -Nutrition following for tube feed recommendations     HEMATOLOGY:  #Reported H/o Anemia, POA  -Home medication: Ferrous sulfate 325 mg   -Admission 7/21: Hgb 13.8, HCT 50.3, MCV 97, MCHC 27.4, RDW 19.6   -7/23: restarted Heparin inpatient  -7/24: Hgb 10.8, HCT 35.8, MCV 90, MCHC 30.2, RDW 17.8   Plan  -T&S Q72H; last 7/23  -Holding ferrous sulfate in setting of possible fecal impaction and infection  -Monitoring CBCs     #Thrombocytopenia, 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 Monitor CBCs     #H/o DVT/PE, on Eliquis at home  -Admission 7/21: INR 1.7, PT 19.2, labs likely reflect chronic Eliquis usage  -Admission 7/21: Held Eliquis  -Admission 7/21 CT- No evidence of right heart strain  -7/23: restarted Heparin inpatient  -Cardiology Following  Plan:  -Continuing Heparin inpatient.   -Resume Eliquis upon discharge     MUSCULOSKELETAL/  SKIN:  PT/OT following     INFECTIOUS DISEASE:  #COVID   -Imagin/21 CXR- bibasilar patchy airspace disease, worse at left lung base, small left effusion  -Labs: No leukocytosis during admission  -Symptoms: Patient denies cough or URI symptoms  -Vancomycin (-). Discontinued upon negative MRSA Nares PCR.        #Possible Abdominal Improved  -Given 2g Cefepime, 1g Meropenem, and 2g Vancomycin in ED   -Imagin/21 CT CAP- likely fecal impaction with stercoral proctitis  -Labs: No leukocytosis.  Bcx * 2- One bottle positive for staphylococcus cohnii, anaerobic gram positive cocci in clusters. Believed to be contaminant.   -Antibiotics: Vancomycin (-). Discontinued upon negative MRSA Nares PCR. Metronidazole (- ). Aztreonam (-)    To Do  -Monitor bowel output and adjust regimen PRN  -Recommend consulting endocrinology or arranging for outpatient endocrinology follow up for new onset diabetes  -Patient placed on Midodrine. Before discharge should be reconciled with her home antihypertensives and which need is present.  -Ferrous sulfate held inpatient in setting of constipation. Recommend starting every other day upon discharge so patient receives benefits while minimizing constipation  -Resume Eliquis upon discharge  -Patient currently NPO with NG tube, but eats soft foods at nursing home. SLP following.    E: Exam, including Lines/Drains/Airways & Data Review:   Constitutional:  She is awake. She is obese.   HENT: Normocephalic and atraumatic. Mucous membranes are dry.   Eyes: Extraocular movements intact.   Cardiovascular: Normal rate and regular rhythm. Normal pulses. Heart sounds are distant. No murmur heard. No friction rub. No gallop.   Pulmonary: Pulmonary effort is normal. No respiratory distress. No wheezing or rales.   Abdominal:  A surgical scar is present. Bowel sounds are normal. There is no distension. Abdomen is soft. There is no hepatomegaly or splenomegaly.  There is no abdominal tenderness. There is no guarding or rebound.   Musculoskeletal:      Right lower le+ Pitting Edema present.      Left lower le+ Pitting Edema present.   Neurological: GCS eye subscore is 4. GCS verbal subscore is 5. GCS motor subscore is 6. Able to move all fingers and toes, R>L as per baseline.     Difficult airway? No  Lines/drains assessed for removal? Yes    Shruti Oshea MD MPH   24 at 6:31 AM     Disclaimer: Documentation completed with the information available at the time of input. The times in the chart may not be reflective of actual patient care times, interventions, or procedures. Documentation occurs after the physical care of the patient.

## 2024-07-25 NOTE — PROGRESS NOTES
"Subjective   NAOE. Pt without acute complaints this AM.      Objective   Visit Vitals  BP 83/54   Pulse 83   Temp 37 °C (98.6 °F)   Resp 17   Ht 1.803 m (5' 11\")   Wt 103 kg (227 lb 1.2 oz)   SpO2 100%   BMI 31.67 kg/m²   Smoking Status Never   BSA 2.27 m²      Physical Exam  General: awake, alert, no acute distress  HEENT: no scleral icterus, on NC  CV: warm and well perfused  Resp: without respiratory distress, protecting airway  Extremities: moving all extremities  Neuo: grossly normal    Cardiographics  Transthoracic Echo (TTE) Limited 7/22:  1. The left ventricular systolic function is normal, with a visually estimated ejection fraction of 65-70%.  2. There is moderate left ventricular hypertrophy.  3. There is normal right ventricular global systolic function.  4. The pulmonary artery is not well visualized.  5. On call limited fellow echo.       Lab Review   Lab Results   Component Value Date     07/25/2024    K 3.6 07/25/2024     (H) 07/25/2024    CO2 29 07/25/2024    BUN 21 07/25/2024    CREATININE 1.14 (H) 07/25/2024    GLUCOSE 164 (H) 07/25/2024    CALCIUM 8.2 (L) 07/25/2024     Lab Results   Component Value Date    WBC 7.3 07/25/2024    HGB 10.3 (L) 07/25/2024    HCT 33.2 (L) 07/25/2024    MCV 87 07/25/2024    PLT 93 (L) 07/25/2024        Assessment/Plan   53F w/ CVA c/b left hemiparesis and dysphagia (2016) s/p PEG placement (2016, replaced 5 times, removed unknown date), HTN, PE/DVT on Eliquis who was sent to the ED by [facility] for evaluation of encephalopathy. She was found to have an ZULMA with hyperkalemia, hyperglycemia, glucose greater than 1400, and the CT concerning for proctitis, she was treated with antibiotics, started with insulin drip, required Levophed briefly, and was intubated for airway protection.     Around 11:52 PM, patient was noted to go into pulseless V-fib arrest, per telemetry review patient had increasing episodes of NSVT and bigeminy prior to this " arrest. The patient required 3 shocks. She received epi x 3, amiodarone 300mg, calcium x 1 , HCO3 x 1 during the code. She  intermittently had ROSC in between episodes.      Impression   #VF arrest   #Elevated troponin  -Patient had increasing episodes of NSVT and bigeminy prior to this arrest highly suggestive of electrolyte imbalance in the setting of HHS and K 3.2 Mg 1.96. It is likely that her true potassium level was lower (6.1 on presentation)   -Troponin peaked at 955, and formal TTE preserved EF with no concerning wall motion abnormalities   -Given this less likely ischemic event, though cannot rule out ischemic cause completely   Recommendations   -Would not start amio as underlying electrolyte abnormalities are being corrected   -Heparin gtt for total 48 hours from event, start asa 81 mg daily, high dose statin  -Plan for coronary angiography on Monday, 7/29 once pt is off COVID precautions. Please make NPO at [place] on Sunday night.    Thank you for the opportunity to contribute to the care of this patient. Above recommendations discussed with Dr. Baker.     If further questions arise, please page the general cardiology [place]ager at [phone] on weekdays 7AM - 6PM and weekends 7AM - 2PM, or at [phone] at all other times.

## 2024-07-25 NOTE — CARE PLAN
The patient's goals for the shift include        Problem: Skin  Goal: Participates in plan/prevention/treatment measures  Outcome: Progressing  Flowsheets (Taken 7/25/2024 0732)  Participates in plan/prevention/treatment measures: Elevate heels  Goal: Prevent/manage excess moisture  Outcome: Progressing  Flowsheets (Taken 7/25/2024 0732)  Prevent/manage excess moisture:   Cleanse incontinence/protect with barrier cream   Moisturize dry skin  Goal: Prevent/minimize sheer/friction injuries  Outcome: Progressing  Flowsheets (Taken 7/25/2024 0732)  Prevent/minimize sheer/friction injuries:   HOB 30 degrees or less   Turn/reposition every 2 hours/use positioning/transfer devices   Use pull sheet   Complete micro-shifts as needed if patient unable. Adjust patient position to relieve pressure points, not a full turn   Utilize specialty bed per algorithm  Goal: Promote/optimize nutrition  Outcome: Progressing  Flowsheets (Taken 7/25/2024 0732)  Promote/optimize nutrition: Monitor/record intake including meals  Goal: Promote skin healing  Outcome: Progressing  Flowsheets (Taken 7/25/2024 0732)  Promote skin healing: Turn/reposition every 2 hours/use positioning/transfer devices     Problem: Fall/Injury  Goal: Not fall by end of shift  Outcome: Progressing  Goal: Be free from injury by end of the shift  Outcome: Progressing  Goal: Verbalize understanding of personal risk factors for fall in the hospital  Outcome: Progressing  Goal: Verbalize understanding of risk factor reduction measures to prevent injury from fall in the home  Outcome: Progressing  Goal: Use assistive devices by end of the shift  Outcome: Progressing  Goal: Pace activities to prevent fatigue by end of the shift  Outcome: Progressing     Problem: Pain - Adult  Goal: Verbalizes/displays adequate comfort level or baseline comfort level  Outcome: Progressing     Problem: Safety - Adult  Goal: Free from fall injury  Outcome: Progressing     Problem: Discharge  Planning  Goal: Discharge to home or other facility with appropriate resources  Outcome: Progressing     Problem: Chronic Conditions and Co-morbidities  Goal: Patient's chronic conditions and co-morbidity symptoms are monitored and maintained or improved  Outcome: Progressing     Problem: Diabetes  Goal: Achieve decreasing blood glucose levels by end of shift  Outcome: Progressing  Goal: Increase stability of blood glucose readings by end of shift  Outcome: Progressing  Goal: Decrease in ketones present in urine by end of shift  Outcome: Progressing  Goal: Maintain electrolyte levels within acceptable range throughout shift  Outcome: Progressing  Goal: Maintain glucose levels >70mg/dl to <250mg/dl throughout shift  Outcome: Progressing  Goal: No changes in neurological exam by end of shift  Outcome: Progressing  Goal: Learn about and adhere to nutrition recommendations by end of shift  Outcome: Progressing  Goal: Vital signs within normal range for age by end of shift  Outcome: Progressing  Goal: Increase self care and/or family involovement by end of shift  Outcome: Progressing  Goal: Receive DSME education by end of shift  Outcome: Progressing
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
## 2024-07-25 NOTE — PROGRESS NOTES
Caryl Tucker is a 53 y.o. female on day 4 of admission presenting with Acute on chronic respiratory failure with hypercapnia (Multi).      Subjective     Brief hospital course-    Ms. Tucker is a 54 y/o F w/ a PMH of CVA c/b left hemiparesis and dysphagia (2016) s/p PEG placement (2016, replaced 5 times, removed unknown date), HTN, PE/DVT on Eliquis who was sent to the ED from [facility] for evaluation of mental status. Nursing facility says patient is normally awake, alert, and able to interact with staff. In the  morning of 7/21, they found the patient hypoxic in the 80s on room air, tachycardic, hypotensive, hyperglycemic to the 400s, and unable to answer questions with drool pooling in her mouth, prompting them to call EMS.     ED labs were notable for ZULMA (BUN 80, Cr 3.57, hyperkalemia (K 6.1), hypernatremic (Na 150, corrected 171), and hyperglycemia (Glucose >1400), with concern for proctitis and large stool burden on imaging. In the ED she received 1L LR, 1 dose of Cefepime, Vancomycin, and Metronidazole, was started on an Insulin Drip, was started on Norepinephrine drip, and given 2g of calcium gluconate. Despite treatments patient's respiratory acidosis and mental status continued to decrease and was therefore intubated for airway protection. She has no charted history of diabetes and no A1C in the chart. Patient was admitted to ICU with acute hypercapnic respiratory failure requiring intubation as well as encephalopathy, likely secondary to hyperglycemia and hypernatremia.    In the ICU on 7/22, patient  had V.fib cardiac arrest. Required 3 shocks, epi x 3, amiodarone 300mg, calcium x 1 , HCO3 x 1 during the code. Patient achieved ROSC and was following commands. She was slowly weaned off sedation and was able to be extubated 7/23/2024. Norepinephrine was weaned and discontinued 7/24/2024. Insulin drip was transitioned to oral insulin regimen. Patient continued to have a heavy stool burden with  no bowel movement concerning for a fecal impaction. 7/24- Had manual disimpaction with multiple bowel movement after.    ED labs-    -CBC-wcc-7.7, hb-13.8, plt-165  -Mg-2.93, Ca-10.7  -INR-1.7  -CMP-glucose-1498, Na-150, K-6.1, Cl-102, Hco3-35, BUN-80, Cr-3.57, eGFR-14, AG-19, Alb-3.9, Tpot-8.7, Alp-128, AST-13, ALT-9  -BNP-6  -HS trop-28  -VBG-PH-7.33, PCO2-80, SO2-38, PO2-49, FIO2-21, HCO3-42, Lactate-3.7  -SJY2a-22.1  -BHB-0.24   -Osmolality-416   -UA-prot+,blood-2+,gluc-over 4+  -Covid-detected    -EKG Interpretation: Normal sinus rhythm at a rate of 95, , QRS 96, QTc 422 without evidence of STEMI or ischemia     -CXR-IMPRESSION:  1.  Left basilar dense airspace disease concerning for pneumonia  versus atelectasis  2. Low lung volumes    -CT head-IMPRESSION:  Minimal cortical volume loss without hydrocephalus, hemorrhage or  extra-axial collection.    -CTAP-Abdomen-Pelvis  1.  Markedly distended rectum with mild thickening of the lower  rectum and anorectal junction with a large stool ball and mesorectal  fat stranding. Findings likely represent fecal impaction with  stercoral proctitis.  2. Otherwise no acute process in the abdomen and pelvis.      -home medications-    -S/p 7 day course Levofloxacin (completed)   -Eliquis 5mg BID   -Ferrous sulfate 325 mg   -HCTZ 12.5mg   -Lisinopril 20mg   -Senna   -Tylenol 325mg     -Info gathered on phone from relative-    Information gathered from relative states that she arrested at the nursing home and had been on oxygen for about a week. I will personally call the nursing home to gather more information about her past medical history and events leading to this admission. Relatives do not wants to go back to [facility].    Encounter with patient-    Patient said brought to the hospital on account of shortness of breath requiring oxygen. Not clear whether patient has a history of respiratory disease. She denied cough,fever,chills,shortness of  breath,chest pain or abdominal pain.    Objective     Last Recorded Vitals  /68   Pulse 95   Temp (!) 38.3 °C (100.9 °F)   Resp 23   Wt 103 kg (227 lb 1.2 oz)   SpO2 99%   Intake/Output last 3 Shifts:    Intake/Output Summary (Last 24 hours) at 7/25/2024 1739  Last data filed at 7/25/2024 1700  Gross per 24 hour   Intake 1841.32 ml   Output 1270 ml   Net 571.32 ml       Admission Weight  Weight: 93 kg (205 lb) (07/21/24 1046)    Daily Weight  07/25/24 : 103 kg (227 lb 1.2 oz)    Image Results  XR abdomen 1 view  Narrative: Interpreted By:  Gerardo Soler,   STUDY:  XR ABDOMEN 1 VIEW;  7/25/2024 2:15 pm      INDICATION:  Signs/Symptoms:diarrhea, check bowel gas pattern.      COMPARISON:  Exam dated 07/23/2024      ACCESSION NUMBER(S):  [id number]      ORDERING CLINICIAN:  SHAMAR TRISTAN      FINDINGS:  Temperature probe projects over the pelvis. Enteric tube tip projects  over the gastric body. Nonobstructive bowel gas pattern. Limited  evaluation of pneumoperitoneum on supine imaging, however no gross  evidence of free air is noted.      Osseous structures demonstrate no acute bony changes.      Impression: 1.  Nonobstructive bowel-gas pattern.  2. Devices as above. Enteric tube tip projects over the gastric body.      MACRO:  None      Signed by: Gerardo Soler 7/25/2024 2:26 PM  Dictation workstation:   [id number]      Physical Exam  Constitutional:       General: She is not in acute distress.     Appearance: She is not ill-appearing.   HENT:      Head: Normocephalic and atraumatic.      Mouth/Throat:      Mouth: Mucous membranes are moist.   Eyes:      General: No scleral icterus.  Cardiovascular:      Rate and Rhythm: Normal rate and regular rhythm.      Pulses: Normal pulses.      Heart sounds: No murmur heard.     No friction rub. No gallop.   Pulmonary:      Effort: No respiratory distress.      Breath sounds: Rales present. No wheezing.   Abdominal:      General: There is distension.       Tenderness: There is no abdominal tenderness. There is no guarding.      Hernia: No hernia is present.   Musculoskeletal:      Cervical back: No rigidity or tenderness.   Skin:     Capillary Refill: Capillary refill takes less than 2 seconds.      Coloration: Skin is not jaundiced.      Findings: No bruising.   Neurological:      Mental Status: She is alert and oriented to person, place, and time.      Motor: Weakness present.   Psychiatric:         Mood and Affect: Mood normal.         Relevant Results          Results for orders placed or performed during the hospital encounter of 07/21/24 (from the past 24 hour(s))   POCT GLUCOSE   Result Value Ref Range    POCT Glucose 157 (H) 74 - 99 mg/dL   POCT GLUCOSE   Result Value Ref Range    POCT Glucose 131 (H) 74 - 99 mg/dL   POCT GLUCOSE   Result Value Ref Range    POCT Glucose 165 (H) 74 - 99 mg/dL   CBC and Auto Differential   Result Value Ref Range    WBC 7.3 4.4 - 11.3 x10*3/uL    nRBC 0.8 (H) 0.0 - 0.0 /100 WBCs    RBC 3.84 (L) 4.00 - 5.20 x10*6/uL    Hemoglobin 10.3 (L) 12.0 - 16.0 g/dL    Hematocrit 33.2 (L) 36.0 - 46.0 %    MCV 87 80 - 100 fL    MCH 26.8 26.0 - 34.0 pg    MCHC 31.0 (L) 32.0 - 36.0 g/dL    RDW 17.5 (H) 11.5 - 14.5 %    Platelets 93 (L) 150 - 450 x10*3/uL    Immature Granulocytes %, Automated 0.6 0.0 - 0.9 %    Immature Granulocytes Absolute, Automated 0.04 0.00 - 0.70 x10*3/uL   Comprehensive Metabolic Panel   Result Value Ref Range    Glucose 164 (H) 74 - 99 mg/dL    Sodium 144 136 - 145 mmol/L    Potassium 3.6 3.5 - 5.3 mmol/L    Chloride 108 (H) 98 - 107 mmol/L    Bicarbonate 29 21 - 32 mmol/L    Anion Gap 11 10 - 20 mmol/L    Urea Nitrogen 21 6 - 23 mg/dL    Creatinine 1.14 (H) 0.50 - 1.05 mg/dL    eGFR 57 (L) >60 mL/min/1.73m*2    Calcium 8.2 (L) 8.6 - 10.6 mg/dL    Albumin 2.6 (L) 3.4 - 5.0 g/dL    Alkaline Phosphatase 105 33 - 110 U/L    Total Protein 5.6 (L) 6.4 - 8.2 g/dL    AST 78 (H) 9 - 39 U/L    Bilirubin, Total 0.8 0.0 - 1.2 mg/dL     ALT 33 7 - 45 U/L   Magnesium   Result Value Ref Range    Magnesium 1.94 1.60 - 2.40 mg/dL   Phosphorus   Result Value Ref Range    Phosphorus 2.5 2.5 - 4.9 mg/dL   BLOOD GAS MIXED VENOUS FULL PANEL   Result Value Ref Range    POCT pH, Mixed 7.35 7.33 - 7.43 pH    POCT pCO2, Mixed 57 (H) 41 - 51 mm Hg    POCT pO2, Mixed 51 (H) 35 - 45 mm Hg    POCT SO2, Mixed 76 (H) 45 - 75 %    POCT Oxy Hemoglobin, Mixed 73.9 45.0 - 75.0 %    POCT Hematocrit Calculated, Mixed 33.0 (L) 36.0 - 46.0 %    POCT Sodium, Mixed 143 136 - 145 mmol/L    POCT Potassium, Mixed 3.6 3.5 - 5.3 mmol/L    POCT Chloride, Mixed 109 (H) 98 - 107 mmol/L    POCT Ionized Calcium, Mixed 1.19 1.10 - 1.33 mmol/L    POCT Glucose, Mixed 165 (H) 74 - 99 mg/dL    POCT Lactate, Mixed 1.0 0.4 - 2.0 mmol/L    POCT Base Excess, Mixed 4.7 (H) -2.0 - 3.0 mmol/L    POCT HCO3 Calculated, Mixed 31.5 (H) 22.0 - 26.0 mmol/L    POCT Hemoglobin, Mixed 11.0 (L) 12.0 - 16.0 g/dL    POCT Anion Gap, Mixed 6 (L) 10 - 25 mmo/L    Patient Temperature 37.0 degrees Celsius    FiO2 23 %   Manual Differential   Result Value Ref Range    Neutrophils %, Manual 67.3 40.0 - 80.0 %    Bands %, Manual 1.8 0.0 - 5.0 %    Lymphocytes %, Manual 18.6 13.0 - 44.0 %    Monocytes %, Manual 2.6 2.0 - 10.0 %    Eosinophils %, Manual 5.3 0.0 - 6.0 %    Basophils %, Manual 2.6 0.0 - 2.0 %    Atypical Lymphocytes %, Manual 1.8 0.0 - 2.0 %    Seg Neutrophils Absolute, Manual 4.91 1.20 - 7.00 x10*3/uL    Bands Absolute, Manual 0.13 0.00 - 0.70 x10*3/uL    Lymphocytes Absolute, Manual 1.36 1.20 - 4.80 x10*3/uL    Monocytes Absolute, Manual 0.19 0.10 - 1.00 x10*3/uL    Eosinophils Absolute, Manual 0.39 0.00 - 0.70 x10*3/uL    Basophils Absolute, Manual 0.19 (H) 0.00 - 0.10 x10*3/uL    Atypical Lymphs Absolute, Manual 0.13 0.00 - 0.50 x10*3/uL    Total Cells Counted 113     Neutrophils Absolute, Manual 5.04 1.20 - 7.70 x10*3/uL    RBC Morphology No significant RBC morphology present    Heparin Assay, UFH    Result Value Ref Range    Heparin Unfractionated 0.3 See Comment Below for Therapeutic Ranges IU/mL   POCT GLUCOSE   Result Value Ref Range    POCT Glucose 162 (H) 74 - 99 mg/dL   POCT GLUCOSE   Result Value Ref Range    POCT Glucose 171 (H) 74 - 99 mg/dL   POCT GLUCOSE   Result Value Ref Range    POCT Glucose 179 (H) 74 - 99 mg/dL   BLOOD GAS MIXED VENOUS FULL PANEL   Result Value Ref Range    POCT pH, Mixed 7.39 7.33 - 7.43 pH    POCT pCO2, Mixed 50 41 - 51 mm Hg    POCT pO2, Mixed 45 35 - 45 mm Hg    POCT SO2, Mixed 71 45 - 75 %    POCT Oxy Hemoglobin, Mixed 68.8 45.0 - 75.0 %    POCT Hematocrit Calculated, Mixed 34.0 (L) 36.0 - 46.0 %    POCT Sodium, Mixed 140 136 - 145 mmol/L    POCT Potassium, Mixed 4.2 3.5 - 5.3 mmol/L    POCT Chloride, Mixed 108 (H) 98 - 107 mmol/L    POCT Ionized Calcium, Mixed 1.18 1.10 - 1.33 mmol/L    POCT Glucose, Mixed 224 (H) 74 - 99 mg/dL    POCT Lactate, Mixed 1.0 0.4 - 2.0 mmol/L    POCT Base Excess, Mixed 4.4 (H) -2.0 - 3.0 mmol/L    POCT HCO3 Calculated, Mixed 30.3 (H) 22.0 - 26.0 mmol/L    POCT Hemoglobin, Mixed 11.2 (L) 12.0 - 16.0 g/dL    POCT Anion Gap, Mixed 6 (L) 10 - 25 mmo/L    Patient Temperature 37.0 degrees Celsius    FiO2 21 %   POCT GLUCOSE   Result Value Ref Range    POCT Glucose 200 (H) 74 - 99 mg/dL           Assessment/Plan   This patient currently has cardiac telemetry ordered; if you would like to modify or discontinue the telemetry order, click here to go to the orders activity to modify/discontinue the order.    Principal Problem:    Acute on chronic respiratory failure with hypercapnia (Multi)  Active Problems:    Rash and nonspecific skin eruption    Caryl Tucker is a 53 y.o. year old female patient w/ a PMH significant for PMH of CVA c/b left hemiparesis and dysphagia (2016) s/p PEG placement (2016, replaced 5 times, removed unknown date), HTN, PE/DVT on Eliquis, who was admitted to the MICU on 07/21/24 for acute hypercapnic respiratory failure requiring  intubation, encephalopathy, hyperglycemia, and hypernatremia. Vfib arrest occurred 7/22- ROSC achieved. Patient currently off pressers, sedation, insulin drip, and extubated.     MICU RECS/TO-DOs  -Monitor bowel output and adjust regimen PRN  -Recommend consulting endocrinology or arranging for outpatient endocrinology follow up for new onset diabetes  -Patient placed on Midodrine. Before discharge should be reconciled with her home antihypertensives and which need is present.  -Ferrous sulfate held inpatient in setting of constipation. Recommend starting every other day upon discharge so patient receives benefits while minimizing constipation  -Resume Eliquis upon discharge  -Patient currently NPO with NG tube, but eats soft foods at nursing home. SLP following.    #Shock, requiring pressors, ddx: cardiovascular secondary to vfib arrest vs hypovolemic secondary to HHS  #S/P Vfib arrest 7/22, likely secondary to hypokalemia vs less likely ischemic event  #Elevated troponin s/p arrest, improving  -Home medications: HCTZ 12.5mg, Lisinopril 20mg   -Admission 7/21: Troponin 28, BNP 6  -Admission 7/21: TTE: preserved EF with no concerning wall motion abnormalities  -7/22: V.fib cardiac arrest. Required 3 shocks, epi x 3, amiodarone 300mg, calcium x 1 , HCO3 x 1 during the code. Patient achieved ROSC and was following commands   -S/P v.fib cardiac arrest 7/22- Trop: 304 ->955 -> 709  Plan:  -Elevated lactate on presentation. Now wnl.  -Cardiology consulted  -7/23 began ASA 81mg daily, Atorvastatin 40 secondary to possible ischemic event  7/24: Discontinued Pressers  7/25: Pressors were intermittently needed overnight. Discontinued this morning.  Plan:  -Holding home medications in setting of ZULMA  -Per Cardiology recommendations, will angiogram on 7/29  -DVT management per endocrinology section  -Beginning Midodrine 7/25    #HHS in the setting of hyperglycemia and hypernatremia- resolved  -Admission 7/21: Glucose 1,498, Na  150  -Admission Relevant labs: Beta Hydroxybutyrate 0.24, TSH 3.43, Lactate 3.5, HA1C: 12.1  -Urine Osmolality: 455 (nl)  -Urine Electrolytes:  Na 91, K 37, Cr 23.9, Chloride 108; abnormal: chloride/Cr ratio: 452  -[facility] Protocol Insulin: Drip begun in ED, held 7/22 in setting of hypokalemia, resumed 7/23 and titrated down, discontinued 7/23. Current management: Lantus 10 and Moderate ISS  -[facility] Protocol Fluids: Current management includes 0.45% NaCl and free water flushes. Osmolar gap closed, discontinued D5W  -Trending glucose and Na: 7/23: Glucose 147, Na 145  -Trending Serum Osmolality: 7/21 416 -> 369 -> 344 -> 7/24 319  -Consulted nephrology, following recommendations  Plan:  -Daily RFP  -Continuing Lantus 10 and Moderate ISS  -Fluids: free water flushes  -Nutrition following for tube feed recommendation      #Acute Hypoxic Hypercapnic Respiratory Failure, improving  #Suspected chronic CO2 retainer, at baseline  -Admission 7/21:  Bicarbonate 35  -7/21 intubated on admission  -7/23: Extubated  -7/25: No oxygen requirement, at baseline bicarbonate  Plan:  -Monitoring post extubation with daily VBGs and vitals    #H/o Gastrostomy Tube  #Concern Fecal Impaction on Imaging- improved  -Admission: No PEG tube in place, healed abdominal scar noted.  -7/21 CT CAP: markedly distended rectum with mild thickening of the lower rectum and anorectal junction with large stool ball and mesorectal fat stranding, likely representing fecal impaction with stercoral proctitis  -Home medication: On Senna   -7/21: received 1 enema. Did not tolerate subsequent enemas. Unable to maintain fluid in rectum for efficient use.  -No bowel movement since 7/22 during code. Passing gas.  -7/23 SLP Eval s/p extubation. Patient recommended to be strict NPO with aggressive oral care.  -7/24: Manual fecal disimpaction. Multiple bowel movements after  -7/25: Overnight, only watery stool noted by nurse. F/U 7/25 KUB demonstrated nonobstructive bowel-gas  pattern. Adjusted bowel regimen.  Plan:  -Bowel regimen: senna/docusate, Miralax daily (Lactulose discontinued)  -Nutrition following for tube feed recommendations    #ZULMA, improved, near baseline  -: BUN 20s, Cr 0.85,   -On admission: BUN 80, Cr 3.57, AG 19, Egfr 14  -: Improving labs: BUN 21, Cr: 1.14, Egfr 57  Plan:  -Nephrology signed off on   -Avoiding nephrotoxic medications  -For fluid management, see Endocrinology    #H/o DVT/PE, on Eliquis at home  -Admission : INR 1.7, PT 19.2, labs likely reflect chronic Eliquis usage  -Admission : Held Eliquis  -Admission  CT- dilated main pulmonary artery-up to 4cm  -: restarted Heparin inpatient  -Cardiology Following  Plan:  -Continuing Heparin inpatient.   -Resume Eliquis upon discharge.    #COVID   -Imagin/21 CXR- bibasilar patchy airspace disease, worse at left lung base, small left effusion  -Labs: No leukocytosis during admission  -Symptoms: Patient denies cough or URI symptoms  -Vancomycin (-). Discontinued upon negative MRSA Nares PCR.       #Possible Abdominal infection-Improved  -Given 2g Cefepime, 1g Meropenem, and 2g Vancomycin in ED   -Imagin/21 CT CAP- likely fecal impaction with stercoral proctitis  -Labs: No leukocytosis.  Bcx * 2- One bottle positive for staphylococcus cohnii, anaerobic gram positive cocci in clusters. Believed to be contaminant.   -Antibiotics: Vancomycin (-). Discontinued upon negative MRSA Nares PCR. Metronidazole (- ). Aztreonam (-)      #Hypernatremia, resolved  -On admission : 150 ->  160 ->  151 ->  144  Plan  -Daily RFP  -See endocrinology for additional workup of HHS    #Hyperkalemia- resolved  -On admission : K 6.1. Given 2g Calcium Gluconate in ED  - - K dropped to 3.1- labs reflecting time patient coded. Aggressively repleted K.  -: K 3.7 ->  4.5 ->  4.2 ->  3.6  Plan  -Daily RFP labs  -Maintaining goal K >  4  -Will replete PRN    #Hypermagnesemia-resolved  -On admission 7/21: Mg 2.93 -> 7/24: 2.46 -> 7/25 1.94  Plan  -Daily Mg labs  -Maintaining >2, <2.4  -Will replete Mg PRN    #Hypercalcemia, POA- resolved  #Hypocalcemia in setting of Hypoalbuminemia, corrected to Normal Ca  -Admission 7/21: Ca 10.7, + Protein Gap: Total Protein 8.7, Albumin 3.9.   -Original concern for multiple myeloma. SPEP: Gamma spike. UPEP: Mild proteinuria  -Calcium since 7/22-23: WNL -> 7/24: 8.0, corrected 8.6 -> 8.2  -7/22: Albumin 3.0 -> 7/23 3.9 -> 7/24 2.6 -> 7/25 2.6  SPEP: Gamma spike. 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 Monitor CBCs    FEN  · Fluids: Free water flushes  · Electrolytes: Will replete PRN, with goals of Mg >2, K>4  · Nutrition: Tube feeds  Prophylaxis:  · DVT ppx: Heparin  · GI ppx/Bowel care: Senna/Docusate liquid, Lactulose, Suppositories   Hardware:  · Drain: Urinary catheter  · Lines: PIV  Social:  · Code: Full Code - confirmed with Radha, proxy, via phone 7/21/2024  · HPOA: Radha Osullivan (POA) - [phone] - Aunt      Plan is not final until fully discussed with Attending- Mahin Zhong MD.       Brice Hills MD  PGY-1

## 2024-07-25 NOTE — SIGNIFICANT EVENT
Floor Readiness Note     I, personally, evaluated Caryl Tucker prior to transfer to the floor, including reviewing all current laboratory and imaging studies. The patient remains appropriate for transfer to the floor. Bedside nurse and respiratory therapy are also in agreement of patient's readiness for the floor.     Brief summary:  Caryl Tucker is a 53 y.o. year old female patient w/ a PMH significant for PMH of CVA c/b left hemiparesis and dysphagia, HTN, PE/DVT on Eliquis, who was admitted to the MICU on 24 for acute hypercapnic respiratory failure requiring intubation, encephalopathy, hyperglycemia, and hypernatremia. Vfib arrest occurred - ROSC achieved. Treated with pressers, sedation, insulin drip, and intubation, all of which are complete.    Updated focused Physical Exam:  Constitutional:  She is awake. She is obese.   HENT: Normocephalic and atraumatic. Mucous membranes are dry.   Eyes: Extraocular movements intact.   Cardiovascular: Normal rate and regular rhythm. Normal pulses. Heart sounds are distant. No murmur heard. No friction rub. No gallop.   Pulmonary: Pulmonary effort is normal. No respiratory distress. No wheezing or rales.   Abdominal:  A surgical scar is present. Bowel sounds are normal. There is no distension. Abdomen is soft. There is no hepatomegaly or splenomegaly. There is no abdominal tenderness. There is no guarding or rebound.   Musculoskeletal:      Right lower le+ Pitting Edema present.      Left lower le+ Pitting Edema present.   Neurological: GCS eye subscore is 4. GCS verbal subscore is 5. GCS motor subscore is 6. Able to move all fingers and toes, R>L as per baseline.     Current Vital Signs:  Heart Rate: 91 (24 1500 : Riccardo Martin RN)  BP: 96/56 (24 1500 : Riccardo Martin RN)  Temp: 37.7 °C (99.9 °F) (24 1500 : Riccardo Martin RN)  Resp: 25 (24 1500 : Riccardo Martin RN)  SpO2: 98 % (24 1500 : Riccardo Martin RN)    Relevant  updates since rounds:  No new updates.  Recommendations for floor team:  -Monitor bowel output and adjust regimen PRN  -Recommend consulting endocrinology or arranging for outpatient endocrinology follow up for new onset diabetes  -Patient placed on Midodrine. Before discharge should be reconciled with her home antihypertensives and which need is present.  -Ferrous sulfate held inpatient in setting of constipation. Recommend starting every other day upon discharge so patient receives benefits while minimizing constipation  -Resume Eliquis upon discharge  -Patient currently NPO with NG tube, but eats soft foods at nursing home. SLP following.    Accepting team, Naff, received verbal sign out and the Provider Care team/Attending has been updated. Bedside nurse will now call accepting nurse for report and patient will be transferred to [place] [id number].    Shruti Oshea MD MPH   07/25/24 at 3:19 PM

## 2024-07-26 LAB
ALBUMIN SERPL BCP-MCNC: 2.8 G/DL (ref 3.4–5)
ALP SERPL-CCNC: 126 U/L (ref 33–110)
ALT SERPL W P-5'-P-CCNC: 48 U/L (ref 7–45)
ANION GAP SERPL CALC-SCNC: 13 MMOL/L (ref 10–20)
AST SERPL W P-5'-P-CCNC: 100 U/L (ref 9–39)
BACTERIA UR CULT: NO GROWTH
BASOPHILS # BLD AUTO: 0.04 X10*3/UL (ref 0–0.1)
BASOPHILS NFR BLD AUTO: 0.4 %
BILIRUB SERPL-MCNC: 0.5 MG/DL (ref 0–1.2)
BUN SERPL-MCNC: 16 MG/DL (ref 6–23)
CALCIUM SERPL-MCNC: 8.3 MG/DL (ref 8.6–10.6)
CHLORIDE SERPL-SCNC: 103 MMOL/L (ref 98–107)
CO2 SERPL-SCNC: 28 MMOL/L (ref 21–32)
CREAT SERPL-MCNC: 1.17 MG/DL (ref 0.5–1.05)
EGFRCR SERPLBLD CKD-EPI 2021: 55 ML/MIN/1.73M*2
EOSINOPHIL # BLD AUTO: 0.26 X10*3/UL (ref 0–0.7)
EOSINOPHIL NFR BLD AUTO: 2.9 %
ERYTHROCYTE [DISTWIDTH] IN BLOOD BY AUTOMATED COUNT: 17.4 % (ref 11.5–14.5)
GLUCOSE BLD MANUAL STRIP-MCNC: 121 MG/DL (ref 74–99)
GLUCOSE BLD MANUAL STRIP-MCNC: 154 MG/DL (ref 74–99)
GLUCOSE BLD MANUAL STRIP-MCNC: 238 MG/DL (ref 74–99)
GLUCOSE BLD MANUAL STRIP-MCNC: 254 MG/DL (ref 74–99)
GLUCOSE BLD MANUAL STRIP-MCNC: 278 MG/DL (ref 74–99)
GLUCOSE SERPL-MCNC: 270 MG/DL (ref 74–99)
HCT VFR BLD AUTO: 34.5 % (ref 36–46)
HGB BLD-MCNC: 10.2 G/DL (ref 12–16)
HOLD SPECIMEN: NORMAL
IMM GRANULOCYTES # BLD AUTO: 0.08 X10*3/UL (ref 0–0.7)
IMM GRANULOCYTES NFR BLD AUTO: 0.9 % (ref 0–0.9)
LYMPHOCYTES # BLD AUTO: 1.71 X10*3/UL (ref 1.2–4.8)
LYMPHOCYTES NFR BLD AUTO: 18.9 %
MAGNESIUM SERPL-MCNC: 2.01 MG/DL (ref 1.6–2.4)
MCH RBC QN AUTO: 26.9 PG (ref 26–34)
MCHC RBC AUTO-ENTMCNC: 29.6 G/DL (ref 32–36)
MCV RBC AUTO: 91 FL (ref 80–100)
MONOCYTES # BLD AUTO: 1.2 X10*3/UL (ref 0.1–1)
MONOCYTES NFR BLD AUTO: 13.3 %
NEUTROPHILS # BLD AUTO: 5.74 X10*3/UL (ref 1.2–7.7)
NEUTROPHILS NFR BLD AUTO: 63.6 %
NRBC BLD-RTO: 0 /100 WBCS (ref 0–0)
PHOSPHATE SERPL-MCNC: 2 MG/DL (ref 2.5–4.9)
PLATELET # BLD AUTO: 90 X10*3/UL (ref 150–450)
POTASSIUM SERPL-SCNC: 3.9 MMOL/L (ref 3.5–5.3)
PROT SERPL-MCNC: 6.2 G/DL (ref 6.4–8.2)
RBC # BLD AUTO: 3.79 X10*6/UL (ref 4–5.2)
SODIUM SERPL-SCNC: 140 MMOL/L (ref 136–145)
UFH PPP CHRO-ACNC: 0.2 IU/ML
UFH PPP CHRO-ACNC: 0.3 IU/ML
WBC # BLD AUTO: 9 X10*3/UL (ref 4.4–11.3)

## 2024-07-26 PROCEDURE — 2500000004 HC RX 250 GENERAL PHARMACY W/ HCPCS (ALT 636 FOR OP/ED): Mod: JZ

## 2024-07-26 PROCEDURE — 80053 COMPREHEN METABOLIC PANEL: CPT

## 2024-07-26 PROCEDURE — 82947 ASSAY GLUCOSE BLOOD QUANT: CPT

## 2024-07-26 PROCEDURE — 2500000001 HC RX 250 WO HCPCS SELF ADMINISTERED DRUGS (ALT 637 FOR MEDICARE OP)

## 2024-07-26 PROCEDURE — 99233 SBSQ HOSP IP/OBS HIGH 50: CPT | Performed by: INTERNAL MEDICINE

## 2024-07-26 PROCEDURE — 36415 COLL VENOUS BLD VENIPUNCTURE: CPT

## 2024-07-26 PROCEDURE — 2500000004 HC RX 250 GENERAL PHARMACY W/ HCPCS (ALT 636 FOR OP/ED)

## 2024-07-26 PROCEDURE — 2500000002 HC RX 250 W HCPCS SELF ADMINISTERED DRUGS (ALT 637 FOR MEDICARE OP, ALT 636 FOR OP/ED)

## 2024-07-26 PROCEDURE — 99233 SBSQ HOSP IP/OBS HIGH 50: CPT

## 2024-07-26 PROCEDURE — 2500000005 HC RX 250 GENERAL PHARMACY W/O HCPCS

## 2024-07-26 PROCEDURE — 85025 COMPLETE CBC W/AUTO DIFF WBC: CPT

## 2024-07-26 PROCEDURE — 1200000002 HC GENERAL ROOM WITH TELEMETRY DAILY

## 2024-07-26 PROCEDURE — 84100 ASSAY OF PHOSPHORUS: CPT

## 2024-07-26 PROCEDURE — 83735 ASSAY OF MAGNESIUM: CPT

## 2024-07-26 PROCEDURE — 86022 PLATELET ANTIBODIES: CPT

## 2024-07-26 PROCEDURE — 85520 HEPARIN ASSAY: CPT

## 2024-07-26 RX ORDER — INSULIN GLARGINE 100 [IU]/ML
15 INJECTION, SOLUTION SUBCUTANEOUS NIGHTLY
Status: DISCONTINUED | OUTPATIENT
Start: 2024-07-26 | End: 2024-07-28

## 2024-07-26 ASSESSMENT — PAIN - FUNCTIONAL ASSESSMENT
PAIN_FUNCTIONAL_ASSESSMENT: 0-10

## 2024-07-26 ASSESSMENT — PAIN SCALES - GENERAL
PAINLEVEL_OUTOF10: 0 - NO PAIN

## 2024-07-26 ASSESSMENT — COGNITIVE AND FUNCTIONAL STATUS - GENERAL
MOVING TO AND FROM BED TO CHAIR: TOTAL
WALKING IN HOSPITAL ROOM: TOTAL
MOBILITY SCORE: 6
TURNING FROM BACK TO SIDE WHILE IN FLAT BAD: TOTAL
STANDING UP FROM CHAIR USING ARMS: TOTAL
CLIMB 3 TO 5 STEPS WITH RAILING: TOTAL
MOVING FROM LYING ON BACK TO SITTING ON SIDE OF FLAT BED WITH BEDRAILS: TOTAL

## 2024-07-26 NOTE — CARE PLAN
The patient's goals for the shift include      The clinical goals for the shift include pt. will remain pain free.    Over the shift, the patient did not make progress toward the following goals. Barriers to progression include . Recommendations to address these barriers include .

## 2024-07-26 NOTE — CARE PLAN
The patient's goals for the shift include will ne safe and free from injury throughout the shift    The clinical goals for the shift include Pt will have no c/o pain through out the shift    Problem: Skin  Goal: Participates in plan/prevention/treatment measures  Flowsheets (Taken 7/26/2024 1858)  Participates in plan/prevention/treatment measures:   Discuss with provider PT/OT consult   Elevate heels  Goal: Prevent/manage excess moisture  Flowsheets (Taken 7/26/2024 1858)  Prevent/manage excess moisture:   Cleanse incontinence/protect with barrier cream   Monitor for/manage infection if present   Follow provider orders for dressing changes   Moisturize dry skin  Goal: Prevent/minimize sheer/friction injuries  Flowsheets (Taken 7/26/2024 1858)  Prevent/minimize sheer/friction injuries:   Complete micro-shifts as needed if patient unable. Adjust patient position to relieve pressure points, not a full turn   Use pull sheet   Turn/reposition every 2 hours/use positioning/transfer devices   HOB 30 degrees or less   Utilize specialty bed per algorithm  Goal: Promote/optimize nutrition  Flowsheets (Taken 7/26/2024 1858)  Promote/optimize nutrition:   Assist with feeding   Offer water/supplements/favorite foods  Goal: Promote skin healing  Flowsheets (Taken 7/26/2024 1858)  Promote skin healing:   Assess skin/pad under line(s)/device(s)   Turn/reposition every 2 hours/use positioning/transfer devices   Protective dressings over bony areas

## 2024-07-26 NOTE — SIGNIFICANT EVENT
RADAR pg - SpO2 91%    Pt positive for Covid but resting comfortably on RA. No current concerns from RN -     RN VS recheck with SpO2 96% on RA    RN encouraged to reach out with any future concerns

## 2024-07-26 NOTE — SIGNIFICANT EVENT
Rapid Response RN Note    RADAR score 6 due to the following VS: T 37.3 °C; ; RR 18; BP 97/57; SPO2 91%.     Reviewed above VS with bedside RN via phone.  Vital signs within patient's current trends.  Bedside RN to recheck SPO2 and reach out if needed. No interventions by rapid response team indicated at this time.    Staff to page rapid response for any concerns or acute change in condition/VS. Brandon Leyva RN.

## 2024-07-26 NOTE — PROGRESS NOTES
Transitional Care Coordination Progress Note:  Patient discussed during interdisciplinary rounds.  Team members present: MD, LEA  Plan per Medical/Surgical team: Pt s/p MICU transfer admitted with HHS (BS 1400) and arrested in the MICU. Per medical team plan for cardiac cath on Monday, and blood glucose management.   Payer: [employer], [employer]  Status: Inpatient  Discharge disposition: PT is recommending moderate intensity therapy post discharge. Pt will eventually need ICF as per POA Radha she was long term resident at [facility] since 2016-17.  Potential Barriers: none  ADOD: 7/31  Spoke with Radha [phone] regarding SNF preferences, she stated she just received the SNF list last night and have not made any selections yet. Radha stated she will review SNF list over the weekend and call me back Monday with SNF selections. Radha asked to provide me with minimum of 5 options since she will be seeking SNF to ICF for pt. Radha stated pt's belongings are still at [facility] she have not had the change to retrieve them yet. Care coordinator will continue to follow for discharge planning needs.     Sugar Samuels RN  Transitional Care Coordinator/TCC  [phone]

## 2024-07-26 NOTE — PROGRESS NOTES
Caryl Tucker is a 53 y.o. female on day 5 of admission presenting with Acute on chronic respiratory failure with hypercapnia (Multi).      Subjective   NAEON       Objective     Last Recorded Vitals  /64   Pulse 86   Temp 36.4 °C (97.5 °F)   Resp 18   Wt 103 kg (227 lb 1.2 oz)   SpO2 95%   Intake/Output last 3 Shifts:    Intake/Output Summary (Last 24 hours) at 7/26/2024 1429  Last data filed at 7/26/2024 0443  Gross per 24 hour   Intake 574 ml   Output 325 ml   Net 249 ml       Admission Weight  Weight: 93 kg (205 lb) (07/21/24 1046)    Daily Weight  07/25/24 : 103 kg (227 lb 1.2 oz)    Image Results  XR chest 1 view  Narrative: Interpreted By:  Vamsi Allred,  Ned Back   STUDY:  XR CHEST 1 VIEW;  7/25/2024 5:55 pm      INDICATION:  Signs/Symptoms:fever workup.      COMPARISON:  Chest x-ray 07/22/2024; chest, abdomen pelvis CT scan 07/21/2024      ACCESSION NUMBER(S):  [id number]      ORDERING CLINICIAN:  SHAMAR TRISTAN      FINDINGS:  AP radiograph of the chest patient is now slightly rotated towards  right.      LINES:  Interval extubation as well as removal of right IJ approach central  venous catheter and enteric tube. Dobbhoff tube is now visualized  coursing below the level of diaphragm and tip out of the field of  view.      CARDIOMEDIASTINAL SILHOUETTE:  The cardiomediastinal silhouette is grossly stable in size and  configuration.      LUNGS:  Low lung volumes with bronchovascular crowding. Improved but residual  bandlike bibasilar atelectasis, left more than right. There is no new  focal consolidation, pleural effusion or pneumothorax.      ABDOMEN:  No remarkable upper abdominal findings.      BONES:  No acute osseous abnormality.      Impression: 1. Improved but residual bibasilar atelectasis, left more than right.  No new airspace consolidation.  2. Medical devices as above.      I personally reviewed the images/study and I agree with the findings  as stated by Wong  DO Darshan, PGY-3. This study was interpreted  at [facility], [place], [place].      Signed by: Vamsi Allred 7/26/2024 7:27 AM  Dictation workstation:   [id number]      Physical Exam  Constitutional:       General: She is not in acute distress.     Appearance: She is not ill-appearing.   HENT:      Head: Normocephalic and atraumatic.      Mouth/Throat:      Mouth: Mucous membranes are moist.   Eyes:      General: No scleral icterus.  Cardiovascular:      Rate and Rhythm: Normal rate and regular rhythm.      Pulses: Normal pulses.      Heart sounds: No murmur heard.     No friction rub. No gallop.   Pulmonary:      Effort: Pulmonary effort is normal. No respiratory distress.      Breath sounds: No stridor. Rales present. No wheezing or rhonchi.   Abdominal:      General: There is no distension.      Tenderness: There is no abdominal tenderness. There is no guarding or rebound.   Musculoskeletal:      Cervical back: No rigidity or tenderness.      Right lower leg: No edema.      Left lower leg: No edema.   Skin:     Capillary Refill: Capillary refill takes less than 2 seconds.      Coloration: Skin is pale. Skin is not jaundiced.      Findings: No bruising.   Neurological:      Mental Status: She is alert and oriented to person, place, and time.      Motor: Weakness present.   Psychiatric:         Mood and Affect: Mood normal.           Relevant Results             Results for orders placed or performed during the hospital encounter of 07/21/24 (from the past 24 hour(s))   POCT GLUCOSE   Result Value Ref Range    POCT Glucose 200 (H) 74 - 99 mg/dL   Procalcitonin   Result Value Ref Range    Procalcitonin 0.36 (H) <=0.07 ng/mL   Blood Culture    Specimen: Peripheral Venipuncture; Blood culture   Result Value Ref Range    Blood Culture Loaded on Instrument - Culture in progress    Urinalysis with Reflex Culture and Microscopic   Result Value Ref Range    Color, Urine Yellow  Light-Yellow, Yellow, Dark-Yellow    Appearance, Urine Clear Clear    Specific Gravity, Urine 1.019 1.005 - 1.035    pH, Urine 5.5 5.0, 5.5, 6.0, 6.5, 7.0, 7.5, 8.0    Protein, Urine 30 (1+) (A) NEGATIVE, 10 (TRACE), 20 (TRACE) mg/dL    Glucose, Urine OVER (4+) (A) Normal mg/dL    Blood, Urine 1.0 (3+) (A) NEGATIVE    Ketones, Urine 10 (1+) (A) NEGATIVE mg/dL    Bilirubin, Urine NEGATIVE NEGATIVE    Urobilinogen, Urine Normal Normal mg/dL    Nitrite, Urine NEGATIVE NEGATIVE    Leukocyte Esterase, Urine 25 Sugey/µL (A) NEGATIVE   Extra Urine Gray Tube   Result Value Ref Range    Extra Tube Hold for add-ons.    Microscopic Only, Urine   Result Value Ref Range    WBC, Urine 1-5 1-5, NONE /HPF    RBC, Urine >20 (A) NONE, 1-2, 3-5 /HPF    Squamous Epithelial Cells, Urine 1-9 (SPARSE) Reference range not established. /HPF    Mucus, Urine FEW Reference range not established. /LPF   Urine Culture    Specimen: Indwelling (Vergara) Catheter; Urine   Result Value Ref Range    Urine Culture No growth    Blood Culture    Specimen: Peripheral Venipuncture; Blood culture   Result Value Ref Range    Blood Culture Loaded on Instrument - Culture in progress    POCT GLUCOSE   Result Value Ref Range    POCT Glucose 217 (H) 74 - 99 mg/dL   POCT GLUCOSE   Result Value Ref Range    POCT Glucose 207 (H) 74 - 99 mg/dL   POCT GLUCOSE   Result Value Ref Range    POCT Glucose 278 (H) 74 - 99 mg/dL   POCT GLUCOSE   Result Value Ref Range    POCT Glucose 254 (H) 74 - 99 mg/dL   Comprehensive Metabolic Panel   Result Value Ref Range    Glucose 270 (H) 74 - 99 mg/dL    Sodium 140 136 - 145 mmol/L    Potassium 3.9 3.5 - 5.3 mmol/L    Chloride 103 98 - 107 mmol/L    Bicarbonate 28 21 - 32 mmol/L    Anion Gap 13 10 - 20 mmol/L    Urea Nitrogen 16 6 - 23 mg/dL    Creatinine 1.17 (H) 0.50 - 1.05 mg/dL    eGFR 55 (L) >60 mL/min/1.73m*2    Calcium 8.3 (L) 8.6 - 10.6 mg/dL    Albumin 2.8 (L) 3.4 - 5.0 g/dL    Alkaline Phosphatase 126 (H) 33 - 110 U/L    Total  Protein 6.2 (L) 6.4 - 8.2 g/dL     (H) 9 - 39 U/L    Bilirubin, Total 0.5 0.0 - 1.2 mg/dL    ALT 48 (H) 7 - 45 U/L   Phosphorus   Result Value Ref Range    Phosphorus 2.0 (L) 2.5 - 4.9 mg/dL   Heparin Assay, UFH   Result Value Ref Range    Heparin Unfractionated 0.2 See Comment Below for Therapeutic Ranges IU/mL   CBC and Auto Differential   Result Value Ref Range    WBC 9.0 4.4 - 11.3 x10*3/uL    nRBC 0.0 0.0 - 0.0 /100 WBCs    RBC 3.79 (L) 4.00 - 5.20 x10*6/uL    Hemoglobin 10.2 (L) 12.0 - 16.0 g/dL    Hematocrit 34.5 (L) 36.0 - 46.0 %    MCV 91 80 - 100 fL    MCH 26.9 26.0 - 34.0 pg    MCHC 29.6 (L) 32.0 - 36.0 g/dL    RDW 17.4 (H) 11.5 - 14.5 %    Platelets 90 (L) 150 - 450 x10*3/uL    Neutrophils % 63.6 40.0 - 80.0 %    Immature Granulocytes %, Automated 0.9 0.0 - 0.9 %    Lymphocytes % 18.9 13.0 - 44.0 %    Monocytes % 13.3 2.0 - 10.0 %    Eosinophils % 2.9 0.0 - 6.0 %    Basophils % 0.4 0.0 - 2.0 %    Neutrophils Absolute 5.74 1.20 - 7.70 x10*3/uL    Immature Granulocytes Absolute, Automated 0.08 0.00 - 0.70 x10*3/uL    Lymphocytes Absolute 1.71 1.20 - 4.80 x10*3/uL    Monocytes Absolute 1.20 (H) 0.10 - 1.00 x10*3/uL    Eosinophils Absolute 0.26 0.00 - 0.70 x10*3/uL    Basophils Absolute 0.04 0.00 - 0.10 x10*3/uL   Magnesium   Result Value Ref Range    Magnesium 2.01 1.60 - 2.40 mg/dL      Assessment/Plan   Principal Problem:    Acute on chronic respiratory failure with hypercapnia (Multi)  Active Problems:    Rash and nonspecific skin eruption    Caryl Tucker is a 53 y.o. year old female patient w/ a PMH significant for PMH of CVA c/b left hemiparesis and dysphagia (2016) s/p PEG placement (2016, replaced 5 times, removed unknown date), HTN, PE/DVT on Eliquis, who was admitted to the MICU on 07/21/24 for acute hypercapnic respiratory failure requiring intubation, encephalopathy, hyperglycemia, and hypernatremia. Vfib arrest occurred 7/22- ROSC achieved. Patient currently off pressers, sedation,  insulin drip, and extubated.     7/26/2024 updates  -increase lantus to 15u daily  -replenish low PO4 levels  -LHC by cardiology on 7/29       #Shock, requiring pressors, ddx: cardiovascular secondary to vfib arrest vs hypovolemic secondary to HHS  #S/P Vfib arrest 7/22, likely secondary to hypokalemia vs less likely ischemic event  #Elevated troponin s/p arrest, improving  -Home medications: HCTZ 12.5mg, Lisinopril 20mg   -Admission 7/21: Troponin 28, BNP 6  -Admission 7/21: TTE: preserved EF with no concerning wall motion abnormalities  -7/22: V.fib cardiac arrest. Required 3 shocks, epi x 3, amiodarone 300mg, calcium x 1 , HCO3 x 1 during the code. Patient achieved ROSC and was following commands   -S/P v.fib cardiac arrest 7/22- Trop: 304 ->955 -> 709  Plan:  -Elevated lactate on presentation. Now wnl.  -Cardiology consulted  -7/23 began ASA 81mg daily, Atorvastatin 40 secondary to possible ischemic event  7/24: Discontinued Pressers  7/25: Pressors were intermittently needed overnight. Discontinued this morning.  Plan:  -Holding home medications in setting of ZULMA  -Per Cardiology recommendations, will angiogram on 7/29  -DVT management per endocrinology section  -Beginning Midodrine 7/25     #HHS in the setting of hyperglycemia and hypernatremia- resolved  -Admission 7/21: Glucose 1,498, Na 150  -Admission Relevant labs: Beta Hydroxybutyrate 0.24, TSH 3.43, Lactate 3.5, HA1C: 12.1  -Urine Osmolality: 455 (nl)  -Urine Electrolytes:  Na 91, K 37, Cr 23.9, Chloride 108; abnormal: chloride/Cr ratio: 452  -[facility] Protocol Insulin: Drip begun in ED, held 7/22 in setting of hypokalemia, resumed 7/23 and titrated down, discontinued 7/23. Current management: Lantus 10 and Moderate ISS  -[facility] Protocol Fluids: Current management includes 0.45% NaCl and free water flushes. Osmolar gap closed, discontinued D5W  -Trending glucose and Na: 7/23: Glucose 147, Na 145  -Trending Serum Osmolality: 7/21 416 -> 369 -> 344 -> 7/24  319  -Consulted nephrology, following recommendations  Plan:  -Daily RFP  -Continuing Lantus 10 and Moderate ISS  -Fluids: free water flushes  -Nutrition following for tube feed recommendation        #Acute Hypoxic Hypercapnic Respiratory Failure, improving  -Admission 7/21:  Bicarbonate 35  -7/21 intubated on admission  -7/23: Extubated  -7/25: No oxygen requirement, at baseline bicarbonate  Plan:  -Monitoring post extubation with daily VBGs and vitals     #H/o Gastrostomy Tube  #Concern Fecal Impaction on Imaging- improved  -Admission: No PEG tube in place, healed abdominal scar noted.  -7/21 CT CAP: markedly distended rectum with mild thickening of the lower rectum and anorectal junction with large stool ball and mesorectal fat stranding, likely representing fecal impaction with stercoral proctitis  -Home medication: On Senna   -7/21: received 1 enema. Did not tolerate subsequent enemas. Unable to maintain fluid in rectum for efficient use.  -No bowel movement since 7/22 during code. Passing gas.  -7/23 SLP Eval s/p extubation. Patient recommended to be strict NPO with aggressive oral care.  -7/24: Manual fecal disimpaction. Multiple bowel movements after  -7/25: Overnight, only watery stool noted by nurse. F/U 7/25 KUB demonstrated nonobstructive bowel-gas pattern. Adjusted bowel regimen.  Plan:  -Bowel regimen: senna/docusate, Miralax daily (Lactulose discontinued)  -Nutrition following for tube feed recommendations     #ZULMA, improved, near baseline  -2021: BUN 20s, Cr 0.85,   -On admission: BUN 80, Cr 3.57, AG 19, Egfr 14  -7/24: Improving labs: BUN 21, Cr: 1.14, Egfr 57  Plan:  -Nephrology signed off on 7/24  -Avoiding nephrotoxic medications  -For fluid management, see Endocrinology     #H/o DVT/PE, on Eliquis at home  -Admission 7/21: INR 1.7, PT 19.2, labs likely reflect chronic Eliquis usage  -Admission 7/21: Held Eliquis  -Admission 7/21 CT- dilated main pulmonary artery-up to 4cm  -7/23: restarted  Heparin inpatient  -Cardiology Following  Plan:  -Continuing Heparin inpatient.   -Resume Eliquis upon discharge.     #COVID   -Imagin/21 CXR- bibasilar patchy airspace disease, worse at left lung base, small left effusion  -Labs: No leukocytosis during admission  -Symptoms: Patient denies cough or URI symptoms  -Vancomycin (-). Discontinued upon negative MRSA Nares PCR.       #Possible Abdominal infection-Improved  -Given 2g Cefepime, 1g Meropenem, and 2g Vancomycin in ED   -Imagin/21 CT CAP- likely fecal impaction with stercoral proctitis  -Labs: No leukocytosis.  Bcx * 2- One bottle positive for staphylococcus cohnii, anaerobic gram positive cocci in clusters. Believed to be contaminant.   -Antibiotics: Vancomycin (-). Discontinued upon negative MRSA Nares PCR. Metronidazole (- , restarted ). Aztreonam (-, restarted )        #Hypernatremia, resolved  -On admission : 150 ->  160 ->  151 ->  144 -> 140  Plan  -Daily RFP  -See endocrinology for additional workup of HHS     #Hyperkalemia- resolved  -On admission : K 6.1. Given 2g Calcium Gluconate in ED  - - K dropped to 3.1- labs reflecting time patient coded. Aggressively repleted K.  -: K 3.7 ->  4.5 ->  4.2 ->  3.6 ->  3.9  Plan  -Daily RFP labs  -Maintaining goal K > 4  -Will replete PRN     #Hypermagnesemia-resolved  -On admission : Mg 2.93 -> : 2.46 ->  1.94 ->  2.01  Plan  -Daily Mg labs  -Maintaining >2, <2.4  -Will replete Mg PRN     #Hypercalcemia, POA- resolved  #Hypocalcemia in setting of Hypoalbuminemia, corrected to Normal Ca  -Admission : Ca 10.7, + Protein Gap: Total Protein 8.7, Albumin 3.9.   -Original concern for multiple myeloma. SPEP: Gamma spike. UPEP: Mild proteinuria  -Calcium since -: WNL -> : 8.0, corrected 8.6 -> 8.2  -: Albumin 3.0 ->  3.9 ->  2.6 ->  2.6   SPEP: Gamma spike. 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 Monitor CBCs  -Anti-platelet factor 4 antibody screen     FEN  ·Fluids: Free water flushes  ·Electrolytes: Will replete PRN, with goals of Mg >2, K>4  ·Nutrition: Tube feeds  Prophylaxis:  ·DVT ppx: Heparin  ·         GI ppx/Bowel care: Senna/Docusate liquid, Lactulose, Suppositories   Hardware:  ·Drain: Urinary catheter  ·Lines: PIV  Social:  ·Code: Full Code - confirmed with Radha, proxy, via phone 7/21/2024  ·HPOA: Radha Osullivan (POA) - [phone] - Aunt       Plan is not final until fully discussed with Attending- Mahin Zhong MD.       Brice Hills MD  PGY-1

## 2024-07-26 NOTE — PROGRESS NOTES
"Subjective   Patient mentions she has a lot of tubes everywhere and her NG is bothersome. Still on COVID precautions. Denies CP or SOB. No tele available.    On 7/25, started on midodrine 10mg TID by primary team.      Objective   Visit Vitals  /64   Pulse 86   Temp 36.4 °C (97.5 °F)   Resp 18   Ht 1.803 m (5' 11\")   Wt 103 kg (227 lb 1.2 oz)   SpO2 95%   BMI 31.67 kg/m²   Smoking Status Never   BSA 2.27 m²      Physical Exam  General: awake, alert, no acute distress  HEENT: no scleral icterus, on NC, NG in place  CV: warm and well perfused  Resp: rhonchi all lobes  Extremities: left sided weakness  Neuo: grossly normal    Cardiographics  Transthoracic Echo (TTE) Limited 7/22:  1. The left ventricular systolic function is normal, with a visually estimated ejection fraction of 65-70%.  2. There is moderate left ventricular hypertrophy.  3. There is normal right ventricular global systolic function.  4. The pulmonary artery is not well visualized.  5. On call limited fellow echo.       Lab Review   Lab Results   Component Value Date     07/25/2024    K 3.6 07/25/2024     (H) 07/25/2024    CO2 29 07/25/2024    BUN 21 07/25/2024    CREATININE 1.14 (H) 07/25/2024    GLUCOSE 164 (H) 07/25/2024    CALCIUM 8.2 (L) 07/25/2024     Lab Results   Component Value Date    WBC 7.3 07/25/2024    HGB 10.3 (L) 07/25/2024    HCT 33.2 (L) 07/25/2024    MCV 87 07/25/2024    PLT 93 (L) 07/25/2024        Assessment/Plan   53F w/ CVA c/b left hemiparesis and dysphagia (2016) s/p PEG placement (2016, replaced 5 times, removed unknown date), HTN, PE/DVT on Eliquis who was sent to the ED by [facility] for evaluation of encephalopathy. She was found to have an ZULMA with hyperkalemia, hyperglycemia, glucose greater than 1400, and the CT concerning for proctitis, she was treated with antibiotics, started with insulin drip, required Levophed briefly, and was intubated for airway protection.     Around 11:52 PM, " patient was noted to go into pulseless V-fib arrest, per telemetry review patient had increasing episodes of NSVT and bigeminy prior to this arrest. The patient required 3 shocks. She received epi x 3, amiodarone 300mg, calcium x 1 , HCO3 x 1 during the code. She  intermittently had ROSC in between episodes.      Impression   #VF arrest   #Elevated troponin  -Patient had increasing episodes of NSVT and bigeminy prior to this arrest highly suggestive of electrolyte imbalance in the setting of HHS and K 3.2 Mg 1.96. It is likely that her true potassium level was lower (6.1 on presentation)   -Troponin peaked at 955, and formal TTE preserved EF with no concerning wall motion abnormalities   -Given this less likely ischemic event, though cannot rule out ischemic cause completely   Recommendations   -Would not start amio as underlying electrolyte abnormalities are being corrected   -Initially plan was to continue Heparin gtt for total 48 hours from event, to be discontinued 7/25 at 1700, however, patient has hx of DVT/PE and on eliquis. Will continue IV heparin gtt until after [facility].   -Continue asa 81 mg daily, high dose statin  -Plan for coronary angiography on Monday, 7/29 once pt is off COVID precautions. Please make NPO at [facility] on Sunday night. Monitor PLT, downtrending, today 93.     Abhijit Lucio CNP   Thank you for the opportunity to contribute to the care of this patient. Above recommendations discussed with Dr. Baker.     If further questions arise, please page the general cardiology consult pager at [phone] on weekdays 7AM - 6PM and weekends 7AM - 2PM, or at [phone] at all other times.

## 2024-07-26 NOTE — NURSING NOTE
Patient arrived via cart from MICU.  Alert and oriented. Denies pain or discomfort. Oriented to suroundings. Call light within reach.

## 2024-07-27 LAB
ABO GROUP (TYPE) IN BLOOD: NORMAL
ALBUMIN SERPL BCP-MCNC: 2.8 G/DL (ref 3.4–5)
ALP SERPL-CCNC: 117 U/L (ref 33–110)
ALT SERPL W P-5'-P-CCNC: 57 U/L (ref 7–45)
ANION GAP SERPL CALC-SCNC: 15 MMOL/L (ref 10–20)
ANTIBODY SCREEN: NORMAL
APTT PPP: 58 SECONDS (ref 27–38)
AST SERPL W P-5'-P-CCNC: 109 U/L (ref 9–39)
BASOPHILS # BLD AUTO: 0.04 X10*3/UL (ref 0–0.1)
BASOPHILS NFR BLD AUTO: 0.5 %
BILIRUB DIRECT SERPL-MCNC: 0.1 MG/DL (ref 0–0.3)
BILIRUB SERPL-MCNC: 0.4 MG/DL (ref 0–1.2)
BUN SERPL-MCNC: 12 MG/DL (ref 6–23)
CALCIUM SERPL-MCNC: 8.8 MG/DL (ref 8.6–10.6)
CHLORIDE SERPL-SCNC: 102 MMOL/L (ref 98–107)
CO2 SERPL-SCNC: 27 MMOL/L (ref 21–32)
CREAT SERPL-MCNC: 0.96 MG/DL (ref 0.5–1.05)
EGFRCR SERPLBLD CKD-EPI 2021: 70 ML/MIN/1.73M*2
EOSINOPHIL # BLD AUTO: 0.29 X10*3/UL (ref 0–0.7)
EOSINOPHIL NFR BLD AUTO: 3.8 %
ERYTHROCYTE [DISTWIDTH] IN BLOOD BY AUTOMATED COUNT: 17.4 % (ref 11.5–14.5)
ERYTHROCYTE [DISTWIDTH] IN BLOOD BY AUTOMATED COUNT: 17.8 % (ref 11.5–14.5)
GLUCOSE BLD MANUAL STRIP-MCNC: 193 MG/DL (ref 74–99)
GLUCOSE BLD MANUAL STRIP-MCNC: 208 MG/DL (ref 74–99)
GLUCOSE BLD MANUAL STRIP-MCNC: 246 MG/DL (ref 74–99)
GLUCOSE BLD MANUAL STRIP-MCNC: 251 MG/DL (ref 74–99)
GLUCOSE BLD MANUAL STRIP-MCNC: 251 MG/DL (ref 74–99)
GLUCOSE SERPL-MCNC: 271 MG/DL (ref 74–99)
HCT VFR BLD AUTO: 27.7 % (ref 36–46)
HCT VFR BLD AUTO: 31.1 % (ref 36–46)
HGB BLD-MCNC: 7.7 G/DL (ref 12–16)
HGB BLD-MCNC: 9.8 G/DL (ref 12–16)
IMM GRANULOCYTES # BLD AUTO: 0.14 X10*3/UL (ref 0–0.7)
IMM GRANULOCYTES NFR BLD AUTO: 1.8 % (ref 0–0.9)
INR PPP: 1 (ref 0.9–1.1)
INTERPRETATION FOR ANTI-PLATELET FACTOR 4 ANTIBODY: NEGATIVE
LYMPHOCYTES # BLD AUTO: 1.64 X10*3/UL (ref 1.2–4.8)
LYMPHOCYTES NFR BLD AUTO: 21.4 %
MAGNESIUM SERPL-MCNC: 1.94 MG/DL (ref 1.6–2.4)
MCH RBC QN AUTO: 27 PG (ref 26–34)
MCH RBC QN AUTO: 27.1 PG (ref 26–34)
MCHC RBC AUTO-ENTMCNC: 27.8 G/DL (ref 32–36)
MCHC RBC AUTO-ENTMCNC: 31.5 G/DL (ref 32–36)
MCV RBC AUTO: 86 FL (ref 80–100)
MCV RBC AUTO: 97 FL (ref 80–100)
MONOCYTES # BLD AUTO: 1.04 X10*3/UL (ref 0.1–1)
MONOCYTES NFR BLD AUTO: 13.6 %
NEUTROPHILS # BLD AUTO: 4.5 X10*3/UL (ref 1.2–7.7)
NEUTROPHILS NFR BLD AUTO: 58.9 %
NRBC BLD-RTO: 0 /100 WBCS (ref 0–0)
NRBC BLD-RTO: 0.5 /100 WBCS (ref 0–0)
PHOSPHATE SERPL-MCNC: 2.9 MG/DL (ref 2.5–4.9)
PLATELET # BLD AUTO: 102 X10*3/UL (ref 150–450)
PLATELET # BLD AUTO: 67 X10*3/UL (ref 150–450)
POTASSIUM SERPL-SCNC: 3.9 MMOL/L (ref 3.5–5.3)
PROT SERPL-MCNC: 6.2 G/DL (ref 6.4–8.2)
PROTHROMBIN TIME: 11.8 SECONDS (ref 9.8–12.8)
RBC # BLD AUTO: 2.85 X10*6/UL (ref 4–5.2)
RBC # BLD AUTO: 3.61 X10*6/UL (ref 4–5.2)
RH FACTOR (ANTIGEN D): NORMAL
SERUM AND PLATELET FACTOR 4: 0.16 OD UNITS
SODIUM SERPL-SCNC: 140 MMOL/L (ref 136–145)
UFH PPP CHRO-ACNC: 0.2 IU/ML
UFH PPP CHRO-ACNC: 0.3 IU/ML
WBC # BLD AUTO: 6.6 X10*3/UL (ref 4.4–11.3)
WBC # BLD AUTO: 7.7 X10*3/UL (ref 4.4–11.3)

## 2024-07-27 PROCEDURE — 80053 COMPREHEN METABOLIC PANEL: CPT

## 2024-07-27 PROCEDURE — 2500000004 HC RX 250 GENERAL PHARMACY W/ HCPCS (ALT 636 FOR OP/ED)

## 2024-07-27 PROCEDURE — 82947 ASSAY GLUCOSE BLOOD QUANT: CPT

## 2024-07-27 PROCEDURE — 2500000002 HC RX 250 W HCPCS SELF ADMINISTERED DRUGS (ALT 637 FOR MEDICARE OP, ALT 636 FOR OP/ED)

## 2024-07-27 PROCEDURE — 85027 COMPLETE CBC AUTOMATED: CPT

## 2024-07-27 PROCEDURE — 85520 HEPARIN ASSAY: CPT

## 2024-07-27 PROCEDURE — 1200000002 HC GENERAL ROOM WITH TELEMETRY DAILY

## 2024-07-27 PROCEDURE — 2500000001 HC RX 250 WO HCPCS SELF ADMINISTERED DRUGS (ALT 637 FOR MEDICARE OP)

## 2024-07-27 PROCEDURE — 99233 SBSQ HOSP IP/OBS HIGH 50: CPT | Performed by: INTERNAL MEDICINE

## 2024-07-27 PROCEDURE — 85610 PROTHROMBIN TIME: CPT

## 2024-07-27 PROCEDURE — 36415 COLL VENOUS BLD VENIPUNCTURE: CPT

## 2024-07-27 PROCEDURE — 83010 ASSAY OF HAPTOGLOBIN QUANT: CPT

## 2024-07-27 PROCEDURE — 85025 COMPLETE CBC W/AUTO DIFF WBC: CPT

## 2024-07-27 PROCEDURE — 84100 ASSAY OF PHOSPHORUS: CPT

## 2024-07-27 PROCEDURE — 83735 ASSAY OF MAGNESIUM: CPT

## 2024-07-27 PROCEDURE — 82248 BILIRUBIN DIRECT: CPT

## 2024-07-27 PROCEDURE — 86901 BLOOD TYPING SEROLOGIC RH(D): CPT

## 2024-07-27 RX ORDER — INSULIN LISPRO 100 [IU]/ML
5 INJECTION, SOLUTION INTRAVENOUS; SUBCUTANEOUS
Status: DISCONTINUED | OUTPATIENT
Start: 2024-07-28 | End: 2024-08-06 | Stop reason: HOSPADM

## 2024-07-27 ASSESSMENT — COGNITIVE AND FUNCTIONAL STATUS - GENERAL
DRESSING REGULAR LOWER BODY CLOTHING: A LOT
DAILY ACTIVITIY SCORE: 15
STANDING UP FROM CHAIR USING ARMS: TOTAL
WALKING IN HOSPITAL ROOM: A LOT
TURNING FROM BACK TO SIDE WHILE IN FLAT BAD: A LITTLE
MOBILITY SCORE: 12
STANDING UP FROM CHAIR USING ARMS: TOTAL
TOILETING: A LOT
CLIMB 3 TO 5 STEPS WITH RAILING: TOTAL
MOVING TO AND FROM BED TO CHAIR: TOTAL
DAILY ACTIVITIY SCORE: 15
MOBILITY SCORE: 12
PERSONAL GROOMING: A LITTLE
HELP NEEDED FOR BATHING: A LOT
DRESSING REGULAR UPPER BODY CLOTHING: A LOT
DRESSING REGULAR LOWER BODY CLOTHING: A LOT
MOVING TO AND FROM BED TO CHAIR: TOTAL
DRESSING REGULAR UPPER BODY CLOTHING: A LOT
WALKING IN HOSPITAL ROOM: A LOT
HELP NEEDED FOR BATHING: A LOT
TOILETING: A LOT
CLIMB 3 TO 5 STEPS WITH RAILING: TOTAL
TURNING FROM BACK TO SIDE WHILE IN FLAT BAD: A LITTLE
PERSONAL GROOMING: A LITTLE

## 2024-07-27 ASSESSMENT — PAIN SCALES - GENERAL
PAINLEVEL_OUTOF10: 0 - NO PAIN
PAINLEVEL_OUTOF10: 0 - NO PAIN

## 2024-07-27 NOTE — PROGRESS NOTES
Subjective   Caryl Tucker is a 53 y.o. female on hospital day 6 reports no significant events        Objective     Exam     Vitals:    07/27/24 0006 07/27/24 0438 07/27/24 0812 07/27/24 1214   BP: 133/74 114/66 128/70 130/69   Pulse: 82 85 78 81   Resp: 18 18 13 14   Temp: 36.5 °C (97.7 °F) 36.5 °C (97.7 °F) 36.5 °C (97.7 °F) 36.3 °C (97.3 °F)   TempSrc:       SpO2: 93% 97% 96% 96%   Weight:       Height:          Intake/Output last 3 shifts:  I/O last 3 completed shifts:  In: - (0 mL/kg)   Out: 1000 (9.7 mL/kg) [Urine:1000 (0.3 mL/kg/hr)]  Weight: 103 kg     Physical Exam  Vitals reviewed.   Constitutional:       Appearance: She is obese. She is not ill-appearing or diaphoretic.   HENT:      Head: Normocephalic and atraumatic.      Mouth/Throat:      Mouth: Mucous membranes are moist.   Cardiovascular:      Rate and Rhythm: Normal rate and regular rhythm.      Pulses: Normal pulses.      Heart sounds: No murmur heard.     No friction rub. No gallop.   Pulmonary:      Effort: Pulmonary effort is normal.      Breath sounds: Rhonchi present. No wheezing or rales.   Abdominal:      General: Bowel sounds are normal. There is no distension.      Palpations: Abdomen is soft.      Tenderness: There is no abdominal tenderness. There is no guarding or rebound.   Musculoskeletal:      Comments: 1+ bilateral lower extremity edema   Skin:     General: Skin is warm and dry.   Neurological:      General: No focal deficit present.      Mental Status: She is alert and oriented to person, place, and time.   Psychiatric:         Mood and Affect: Mood normal.         Behavior: Behavior normal.            Medications   aspirin, 81 mg, oral, Daily  atorvastatin, 40 mg, oral, Nightly  aztreonam, 1 g, intravenous, q8h  insulin glargine, 15 Units, subcutaneous, Nightly  insulin lispro, 0-10 Units, subcutaneous, q6h  metroNIDAZOLE, 500 mg, intravenous, q8h  midodrine, 10 mg, oral, q8h  oxygen, , inhalation, Continuous -  "Inhalation  polyethylene glycol, 17 g, oral, Daily  senna, 5 mL, oral, Nightly       PRN medications: dextrose, glucagon, glucagon, heparin, ondansetron       Labs     All new labs reviewed:  some of the basic labs as follows -     Results from last 7 days   Lab Units 07/27/24  1041 07/27/24  0536 07/26/24  1219 07/25/24  0413 07/22/24  0010 07/21/24  1121   WBC AUTO x10*3/uL 7.7 6.6 9.0 7.3   < > 7.7   HEMOGLOBIN g/dL 9.8* 7.7* 10.2* 10.3*   < > 13.8   HEMATOCRIT % 31.1* 27.7* 34.5* 33.2*   < > 50.3*   PLATELETS AUTO x10*3/uL 102* 67* 90* 93*   < > 165   NEUTROS PCT AUTO % 58.9  --  63.6  --   --  67.2   LYMPHO PCT MAN %  --   --   --  18.6  --   --    LYMPHS PCT AUTO % 21.4  --  18.9  --   --  14.6   MONO PCT MAN %  --   --   --  2.6  --   --    MONOS PCT AUTO % 13.6  --  13.3  --   --  16.6   EOSINO PCT MAN %  --   --   --  5.3  --   --    EOS PCT AUTO % 3.8  --  2.9  --   --  0.7    < > = values in this interval not displayed.      Results from last 72 hours   Lab Units 07/27/24  1041   INR  1.0   APTT seconds 58*     Results from last 72 hours   Lab Units 07/27/24  1041 07/26/24  1219 07/25/24  0413   SODIUM mmol/L 140 140 144   POTASSIUM mmol/L 3.9 3.9 3.6   CHLORIDE mmol/L 102 103 108*   CO2 mmol/L 27 28 29   BUN mg/dL 12 16 21   CREATININE mg/dL 0.96 1.17* 1.14*     Results from last 72 hours   Lab Units 07/27/24  1041 07/26/24  1219 07/25/24  0413   ALK PHOS U/L 117* 126* 105   AST U/L 109* 100* 78*   ALT U/L 57* 48* 33   BILIRUBIN TOTAL mg/dL 0.4 0.5 0.8   BILIRUBIN DIRECT mg/dL 0.1  --   --    ALBUMIN g/dL 2.8* 2.8* 2.6*   PROTEIN TOTAL g/dL 6.2* 6.2* 5.6*     Results from last 72 hours   Lab Units 07/27/24  1041 07/26/24  1219 07/25/24  0413   GLUCOSE mg/dL 271* 270* 164*     Results from last 72 hours   Lab Units 07/25/24  1758   LEUKOCYTES U  25 Sugey/µL*   NITRITE U  NEGATIVE   WBC UR /HPF 1-5   RBC UR HPF /HPF >20*   BLOOD UR  1.0 (3+)*     No results found for: \"TR1\"  Lab Results   Component Value " Date    URINECULTURE No growth 07/25/2024    BLOODCULT No growth at 1 day 07/25/2024    BLOODCULT No growth at 1 day 07/25/2024    BLOODCULT No growth at 4 days -  FINAL REPORT 07/21/2024    BLOODCULT Staphylococcus cohnii (AA) 07/21/2024            Imaging   XR chest 1 view  Narrative: Interpreted By:  Vamsi Allred,  and Darshan Back   STUDY:  XR CHEST 1 VIEW;  7/25/2024 5:55 pm      INDICATION:  Signs/Symptoms:fever workup.      COMPARISON:  Chest x-ray 07/22/2024; chest, abdomen pelvis CT scan 07/21/2024      ACCESSION NUMBER(S):  [id number]      ORDERING CLINICIAN:  SHAMAR TRISTAN      FINDINGS:  AP radiograph of the chest patient is now slightly rotated towards  right.      LINES:  Interval extubation as well as removal of right IJ approach central  venous catheter and enteric tube. Dobbhoff tube is now visualized  coursing below the level of diaphragm and tip out of the field of  view.      CARDIOMEDIASTINAL SILHOUETTE:  The cardiomediastinal silhouette is grossly stable in size and  configuration.      LUNGS:  Low lung volumes with bronchovascular crowding. Improved but residual  bandlike bibasilar atelectasis, left more than right. There is no new  focal consolidation, pleural effusion or pneumothorax.      ABDOMEN:  No remarkable upper abdominal findings.      BONES:  No acute osseous abnormality.      Impression: 1. Improved but residual bibasilar atelectasis, left more than right.  No new airspace consolidation.  2. Medical devices as above.      I personally reviewed the images/study and I agree with the findings  as stated by Wong Sexton DO, PGY-3. This study was interpreted  at [facility], [place], [place].      Signed by: Vamsi Allred 7/26/2024 7:27 AM  Dictation workstation:   [id number]     No results found for this or any previous visit from the past 1095 days.     Encounter Date: 07/21/24   Electrocardiogram, 12-lead PRN ACS symptoms   Result Value     Ventricular Rate 88    Atrial Rate 88    VT Interval 158    QRS Duration 118    QT Interval 378    QTC Calculation(Bazett) 457    P Axis 71    R Axis 20    T Axis 55    QRS Count 14    Q Onset 229    P Onset 150    P Offset 211    T Offset 418    QTC Fredericia 429    Narrative    Normal sinus rhythm  Nonspecific intraventricular conduction delay  Nonspecific ST and T wave abnormality  Abnormal ECG  When compared with ECG of 22-JUL-2024 00:22,  Sinus rhythm has replaced Junctional rhythm  ST no longer depressed in Inferior leads  Non-specific change in ST segment in Anterior leads  ST less depressed in Lateral leads  Confirmed by Aris Davila ([id number]) on 7/23/2024 5:22:40 PM          Assessment and Plan     Newly diagnosed diabetes mellitus in the setting of HHS:  -Continue Lantus.  Ideally will add lispro 5 units with meals and continue to titrate based on corrective sliding scale  -Continue corrective sliding scale insulin  -Continue diabetes education    V. tach arrest:  -Follow-up cardiology recommendations.  Tentatively planning left heart catheterization 7/29 once out of COVID precautions  -Continue aspirin and statin  -Continue heparin drip at this time    Transaminitis: Overall relatively mild and stable today.  INR remain within normal limits.  Possibly related to V. tach arrest  -Trend LFTs    Thrombocytopenia: Platelets have increased to 102.  Not sure if a.m. CBC had fluid contamination in light of all cell lines dropping and repeat showing bump in all cell lines.  PF4 was negative  -Continue to trend CBC and smear    Acute renal failure: Since resolved  -Monitor renal function panel  -May be able to cut midodrine to 5 mg given improvements in blood pressure    ID: COVID:  -Continue precautions  -On aztreonam and Flagyl.  Can likely discontinue if repeat blood cultures remain no growth to date at 48 to 72 hours    GI:  -Continue bowel care    DVT prophylaxis    Physical therapy/Occupational Therapy  when appropriate    Dwain Landon MD     Of note the above was done with Dragon dictation system.  Note was proofread to minimize errors.

## 2024-07-27 NOTE — CARE PLAN
The patient's goals for the shift include      The clinical goals for the shift include Pt will have no c/o pain through out the shift    Over the shift, the patient did not make progress toward the following goals. Barriers to progression include . Recommendations to address these barriers include .

## 2024-07-27 NOTE — CARE PLAN
The patient's goals for the shift include  listen to music.    The clinical goals for the shift include pt will remain HDS for duration of shift ending on 7/27 at 1900.    Over the shift, the patient made progress towards all goals.

## 2024-07-28 VITALS
HEART RATE: 91 BPM | WEIGHT: 227.07 LBS | SYSTOLIC BLOOD PRESSURE: 123 MMHG | RESPIRATION RATE: 14 BRPM | HEIGHT: 71 IN | DIASTOLIC BLOOD PRESSURE: 76 MMHG | TEMPERATURE: 98.2 F | BODY MASS INDEX: 31.79 KG/M2 | OXYGEN SATURATION: 90 %

## 2024-07-28 PROBLEM — I49.01 CARDIAC ARREST WITH VENTRICULAR FIBRILLATION (MULTI): Status: ACTIVE | Noted: 2024-07-21

## 2024-07-28 PROBLEM — I46.9 CARDIAC ARREST WITH VENTRICULAR FIBRILLATION (MULTI): Status: ACTIVE | Noted: 2024-07-21

## 2024-07-28 LAB
ALBUMIN SERPL BCP-MCNC: 2.9 G/DL (ref 3.4–5)
ALP SERPL-CCNC: 104 U/L (ref 33–110)
ALT SERPL W P-5'-P-CCNC: 62 U/L (ref 7–45)
ANION GAP SERPL CALC-SCNC: 12 MMOL/L (ref 10–20)
AST SERPL W P-5'-P-CCNC: 100 U/L (ref 9–39)
BACTERIA BLD CULT: NORMAL
BACTERIA BLD CULT: NORMAL
BASOPHILS # BLD MANUAL: 0 X10*3/UL (ref 0–0.1)
BASOPHILS NFR BLD MANUAL: 0 %
BILIRUB DIRECT SERPL-MCNC: 0.2 MG/DL (ref 0–0.3)
BILIRUB SERPL-MCNC: 0.4 MG/DL (ref 0–1.2)
BUN SERPL-MCNC: 10 MG/DL (ref 6–23)
CALCIUM SERPL-MCNC: 8.7 MG/DL (ref 8.6–10.6)
CHLORIDE SERPL-SCNC: 100 MMOL/L (ref 98–107)
CO2 SERPL-SCNC: 30 MMOL/L (ref 21–32)
CREAT SERPL-MCNC: 0.95 MG/DL (ref 0.5–1.05)
EGFRCR SERPLBLD CKD-EPI 2021: 71 ML/MIN/1.73M*2
EOSINOPHIL # BLD MANUAL: 0.11 X10*3/UL (ref 0–0.7)
EOSINOPHIL NFR BLD MANUAL: 1 %
ERYTHROCYTE [DISTWIDTH] IN BLOOD BY AUTOMATED COUNT: 17.6 % (ref 11.5–14.5)
GLUCOSE BLD MANUAL STRIP-MCNC: 172 MG/DL (ref 74–99)
GLUCOSE BLD MANUAL STRIP-MCNC: 187 MG/DL (ref 74–99)
GLUCOSE BLD MANUAL STRIP-MCNC: 199 MG/DL (ref 74–99)
GLUCOSE BLD MANUAL STRIP-MCNC: 211 MG/DL (ref 74–99)
GLUCOSE SERPL-MCNC: 203 MG/DL (ref 74–99)
HAPTOGLOB SERPL-MCNC: 153 MG/DL (ref 37–246)
HCT VFR BLD AUTO: 32.7 % (ref 36–46)
HGB BLD-MCNC: 9.9 G/DL (ref 12–16)
IMM GRANULOCYTES # BLD AUTO: 0.57 X10*3/UL (ref 0–0.7)
IMM GRANULOCYTES NFR BLD AUTO: 5.2 % (ref 0–0.9)
LYMPHOCYTES # BLD MANUAL: 1.76 X10*3/UL (ref 1.2–4.8)
LYMPHOCYTES NFR BLD MANUAL: 16 %
MAGNESIUM SERPL-MCNC: 1.79 MG/DL (ref 1.6–2.4)
MCH RBC QN AUTO: 27 PG (ref 26–34)
MCHC RBC AUTO-ENTMCNC: 30.3 G/DL (ref 32–36)
MCV RBC AUTO: 89 FL (ref 80–100)
MONOCYTES # BLD MANUAL: 0.55 X10*3/UL (ref 0.1–1)
MONOCYTES NFR BLD MANUAL: 5 %
MYELOCYTES # BLD MANUAL: 0.66 X10*3/UL
MYELOCYTES NFR BLD MANUAL: 6 %
NEUTS SEG # BLD MANUAL: 7.92 X10*3/UL (ref 1.2–7)
NEUTS SEG NFR BLD MANUAL: 72 %
NRBC BLD-RTO: 1.6 /100 WBCS (ref 0–0)
PHOSPHATE SERPL-MCNC: 2.7 MG/DL (ref 2.5–4.9)
PLATELET # BLD AUTO: 161 X10*3/UL (ref 150–450)
POTASSIUM SERPL-SCNC: 3.7 MMOL/L (ref 3.5–5.3)
PROT SERPL-MCNC: 6.4 G/DL (ref 6.4–8.2)
RBC # BLD AUTO: 3.66 X10*6/UL (ref 4–5.2)
RBC MORPH BLD: ABNORMAL
SODIUM SERPL-SCNC: 138 MMOL/L (ref 136–145)
TOTAL CELLS COUNTED BLD: 100
UFH PPP CHRO-ACNC: 0.2 IU/ML
UFH PPP CHRO-ACNC: 0.3 IU/ML
WBC # BLD AUTO: 11 X10*3/UL (ref 4.4–11.3)

## 2024-07-28 PROCEDURE — 2500000002 HC RX 250 W HCPCS SELF ADMINISTERED DRUGS (ALT 637 FOR MEDICARE OP, ALT 636 FOR OP/ED)

## 2024-07-28 PROCEDURE — 84100 ASSAY OF PHOSPHORUS: CPT

## 2024-07-28 PROCEDURE — 2500000001 HC RX 250 WO HCPCS SELF ADMINISTERED DRUGS (ALT 637 FOR MEDICARE OP)

## 2024-07-28 PROCEDURE — 83735 ASSAY OF MAGNESIUM: CPT

## 2024-07-28 PROCEDURE — 85520 HEPARIN ASSAY: CPT

## 2024-07-28 PROCEDURE — 99232 SBSQ HOSP IP/OBS MODERATE 35: CPT

## 2024-07-28 PROCEDURE — 80053 COMPREHEN METABOLIC PANEL: CPT

## 2024-07-28 PROCEDURE — 82947 ASSAY GLUCOSE BLOOD QUANT: CPT

## 2024-07-28 PROCEDURE — 2500000004 HC RX 250 GENERAL PHARMACY W/ HCPCS (ALT 636 FOR OP/ED)

## 2024-07-28 PROCEDURE — 85027 COMPLETE CBC AUTOMATED: CPT

## 2024-07-28 PROCEDURE — 2500000004 HC RX 250 GENERAL PHARMACY W/ HCPCS (ALT 636 FOR OP/ED): Mod: JZ

## 2024-07-28 PROCEDURE — 36415 COLL VENOUS BLD VENIPUNCTURE: CPT

## 2024-07-28 PROCEDURE — 1200000002 HC GENERAL ROOM WITH TELEMETRY DAILY

## 2024-07-28 PROCEDURE — 85007 BL SMEAR W/DIFF WBC COUNT: CPT

## 2024-07-28 PROCEDURE — 82248 BILIRUBIN DIRECT: CPT

## 2024-07-28 RX ORDER — MIDODRINE HYDROCHLORIDE 10 MG/1
5 TABLET ORAL EVERY 8 HOURS
Status: DISCONTINUED | OUTPATIENT
Start: 2024-07-28 | End: 2024-07-29

## 2024-07-28 RX ORDER — INSULIN GLARGINE 100 [IU]/ML
8 INJECTION, SOLUTION SUBCUTANEOUS NIGHTLY
Status: DISCONTINUED | OUTPATIENT
Start: 2024-07-28 | End: 2024-08-06 | Stop reason: HOSPADM

## 2024-07-28 ASSESSMENT — COGNITIVE AND FUNCTIONAL STATUS - GENERAL
TOILETING: A LOT
WALKING IN HOSPITAL ROOM: A LOT
HELP NEEDED FOR BATHING: A LOT
TURNING FROM BACK TO SIDE WHILE IN FLAT BAD: A LOT
PERSONAL GROOMING: A LOT
DAILY ACTIVITIY SCORE: 12
STANDING UP FROM CHAIR USING ARMS: A LOT
MOVING TO AND FROM BED TO CHAIR: A LOT
MOVING FROM LYING ON BACK TO SITTING ON SIDE OF FLAT BED WITH BEDRAILS: A LOT
DRESSING REGULAR LOWER BODY CLOTHING: A LOT
DRESSING REGULAR UPPER BODY CLOTHING: A LOT
CLIMB 3 TO 5 STEPS WITH RAILING: A LOT
MOBILITY SCORE: 12
EATING MEALS: A LOT

## 2024-07-28 ASSESSMENT — PAIN SCALES - GENERAL: PAINLEVEL_OUTOF10: 0 - NO PAIN

## 2024-07-28 NOTE — PROGRESS NOTES
Caryl Tucker is a 53 y.o. female on day 7 of admission presenting with Acute on chronic respiratory failure with hypercapnia (Multi).      Subjective   PVN reported emesis but pt declines emesis or belly pain (may have been tube feed leakage).        Objective     Last Recorded Vitals  /69   Pulse 79   Temp 36.5 °C (97.7 °F)   Resp 14   Wt 103 kg (227 lb 1.2 oz)   SpO2 97%   Intake/Output last 3 Shifts:    Intake/Output Summary (Last 24 hours) at 7/28/2024 1331  Last data filed at 7/28/2024 0600  Gross per 24 hour   Intake 650 ml   Output 1250 ml   Net -600 ml       Admission Weight  Weight: 93 kg (205 lb) (07/21/24 1046)    Daily Weight  07/25/24 : 103 kg (227 lb 1.2 oz)    Image Results  XR chest 1 view  Narrative: Interpreted By:  Vamsi Allred and Ogievich Taessa   STUDY:  XR CHEST 1 VIEW;  7/25/2024 5:55 pm      INDICATION:  Signs/Symptoms:fever workup.      COMPARISON:  Chest x-ray 07/22/2024; chest, abdomen pelvis CT scan 07/21/2024      ACCESSION NUMBER(S):  [id number]      ORDERING CLINICIAN:  SHAMAR TRISTAN      FINDINGS:  AP radiograph of the chest patient is now slightly rotated towards  right.      LINES:  Interval extubation as well as removal of right IJ approach central  venous catheter and enteric tube. Dobbhoff tube is now visualized  coursing below the level of diaphragm and tip out of the field of  view.      CARDIOMEDIASTINAL SILHOUETTE:  The cardiomediastinal silhouette is grossly stable in size and  configuration.      LUNGS:  Low lung volumes with bronchovascular crowding. Improved but residual  bandlike bibasilar atelectasis, left more than right. There is no new  focal consolidation, pleural effusion or pneumothorax.      ABDOMEN:  No remarkable upper abdominal findings.      BONES:  No acute osseous abnormality.      Impression: 1. Improved but residual bibasilar atelectasis, left more than right.  No new airspace consolidation.  2. Medical devices as above.      I  personally reviewed the images/study and I agree with the findings  as stated by Wong Sexton DO, PGY-3. This study was interpreted  at [facility], [place], [place].      Signed by: Vamsi Allred 7/26/2024 7:27 AM  Dictation workstation:   [id number]      Physical Exam    Gen: NAD, alert and oriented, resting comfortably  HEENT: PERRL, EOMI, no oral lesions, MMM, NG in place  Neck: supple, no masses  Cardiac: RRR. No murmurs, rubs, or gallops  Resp: CTA b/l, no rales or ronchi  Abd: soft, nontender to palpation, no HSM, normal bowel sounds  MSK: Strength and ROM grossly intact b/l  Neuro: CN 2-12 intact b/l, sensation to light touch intact  Skin: No rash or bruising  : eaton in place  Lymph: trace edema LE  Vascular: Radial and pedal pulses 2+ b/l  Psych: Normal mood and affect        Relevant Results               Scheduled medications  aspirin, 81 mg, oral, Daily  atorvastatin, 40 mg, oral, Nightly  insulin glargine, 15 Units, subcutaneous, Nightly  insulin lispro, 0-10 Units, subcutaneous, q6h  insulin lispro, 5 Units, subcutaneous, TID  midodrine, 5 mg, oral, q8h  oxygen, , inhalation, Continuous - Inhalation  polyethylene glycol, 17 g, oral, Daily  senna, 5 mL, oral, Nightly      Continuous medications  heparin, 0-4,000 Units/hr, Last Rate: 1,200 Units/hr (07/28/24 1129)      PRN medications  PRN medications: dextrose, glucagon, glucagon, heparin, ondansetron      Assessment/Plan          This patient has a urinary catheter   Reason for the urinary catheter remaining today? urinary retention/bladder outlet obstruction, acute or chronic          Principal Problem:    Acute on chronic respiratory failure with hypercapnia (Multi)  Active Problems:    Rash and nonspecific skin eruption    Caryl Tucker is a 53 y.o. year old female patient w/ a PMH significant for PMH of CVA c/b left hemiparesis and dysphagia (2016) s/p PEG placement (2016, replaced 5 times, removed unknown  date), HTN, PE/DVT on Eliquis, who was admitted to the MICU on 07/21/24 for acute hypercapnic respiratory failure requiring intubation, encephalopathy, hyperglycemia, and hypernatremia. Vfib arrest occurred 7/22- ROSC achieved. Patient currently off pressers, sedation, insulin drip, and extubated. Patient with new onset thrombocytopenia. PFO4 negative. Other possibilities include drug induced thrombocytopenia though plts improved today. Will stop abx today given no evidence of infection on culture workup. Will have LHC tomorrow for ischemia workup and be NPO and Mn and continue on heparin drip. LFTs also slowly improving after likely ischemic hepatopathy during cardiac arrest     7/27/2024 updates  -Weaned midodrine to 5 mg TID  -Discontinued abx given neg culture since 7/25  -Heart cath LHC tomorrow NPO Mn, insulin dose halfed     #Shock, requiring pressors, ddx: cardiovascular secondary to vfib arrest vs hypovolemic secondary to HHS  #S/P Vfib arrest 7/22, likely secondary to hypokalemia vs less likely ischemic event  #Elevated troponin s/p arrest, improving  -Home medications: HCTZ 12.5mg, Lisinopril 20mg   -Admission 7/21: Troponin 28, BNP 6  -Admission 7/21: TTE: preserved EF with no concerning wall motion abnormalities  -7/22: V.fib cardiac arrest. Required 3 shocks, epi x 3, amiodarone 300mg, calcium x 1 , HCO3 x 1 during the code. Patient achieved ROSC and was following commands   -S/P v.fib cardiac arrest 7/22- Trop: 304 ->955 -> 709  Plan:  -Elevated lactate on presentation. Now wnl.  -Cardiology consulted  -7/23 began ASA 81mg daily, Atorvastatin 40 secondary to possible ischemic event  7/24: Discontinued Pressers  7/25: Pressors were intermittently needed overnight. Discontinued this morning.  Plan:  -Holding home medications in setting of ZULMA  -Per Cardiology recommendations, will angiogram on 7/29  -DVT management per endocrinology section  -Beginning Midodrine 7/25, weaned to 5 mg TID     #HHS in the  setting of hyperglycemia and hypernatremia- resolved  -Admission 7/21: Glucose 1,498, Na 150  -Admission Relevant labs: Beta Hydroxybutyrate 0.24, TSH 3.43, Lactate 3.5, HA1C: 12.1  -Urine Osmolality: 455 (nl)  -Urine Electrolytes:  Na 91, K 37, Cr 23.9, Chloride 108; abnormal: chloride/Cr ratio: 452  -[facility] Protocol Insulin: Drip begun in ED, held 7/22 in setting of hypokalemia, resumed 7/23 and titrated down, discontinued 7/23. Current management: Lantus 10 and Moderate ISS  -[facility] Protocol Fluids: Current management includes 0.45% NaCl and free water flushes. Osmolar gap closed, discontinued D5W  -Trending glucose and Na: 7/23: Glucose 147, Na 145  -Trending Serum Osmolality: 7/21 416 -> 369 -> 344 -> 7/24 319  -Consulted nephrology, following recommendations  Plan:  -Daily RFP  -Continuing Lantus 10 and Moderate ISS. Start Lispro 5u TID  -Fluids: free water flushes  -Nutrition following for tube feed recommendation     #Thrombocytopenia (improving)  -Trend CBC  - Follow up peripheral smear     #Acute Hypoxic Hypercapnic Respiratory Failure, improving  -Admission 7/21:  Bicarbonate 35  -7/21 intubated on admission  -7/23: Extubated  -7/25: No oxygen requirement, at baseline bicarbonate  Plan:  -Monitoring post extubation with daily VBGs and vitals     #H/o Gastrostomy Tube  #Concern Fecal Impaction on Imaging- improved  -Admission: No PEG tube in place, healed abdominal scar noted.  -7/21 CT CAP: markedly distended rectum with mild thickening of the lower rectum and anorectal junction with large stool ball and mesorectal fat stranding, likely representing fecal impaction with stercoral proctitis  -Home medication: On Senna   -7/21: received 1 enema. Did not tolerate subsequent enemas. Unable to maintain fluid in rectum for efficient use.  -No bowel movement since 7/22 during code. Passing gas.  -7/23 SLP Eval s/p extubation. Patient recommended to be strict NPO with aggressive oral care.  -7/24: Manual fecal  disimpaction. Multiple bowel movements after  -: Overnight, only watery stool noted by nurse. F/U  KUB demonstrated nonobstructive bowel-gas pattern. Adjusted bowel regimen.  Plan:  -Bowel regimen: senna/docusate, Miralax daily (Lactulose discontinued)  -Nutrition following for tube feed recommendations     #ZULMA, improved, near baseline  -: BUN 20s, Cr 0.85,   -On admission: BUN 80, Cr 3.57, AG 19, Egfr 14  -: Improving labs: BUN 21, Cr: 1.14, Egfr 57  Plan:  -Nephrology signed off on   -Avoiding nephrotoxic medications  -For fluid management, see Endocrinology     #H/o DVT/PE, on Eliquis at home  -Admission : INR 1.7, PT 19.2, labs likely reflect chronic Eliquis usage  -Admission : Held Eliquis  -Admission  CT- dilated main pulmonary artery-up to 4cm  -: restarted Heparin inpatient  -Cardiology Following  Plan:  -Continuing Heparin inpatient.   -Resume Eliquis upon discharge.     #COVID   -Imagin/21 CXR- bibasilar patchy airspace disease, worse at left lung base, small left effusion  -Labs: No leukocytosis during admission  -Symptoms: Patient denies cough or URI symptoms  -Vancomycin (-). Discontinued upon negative MRSA Nares PCR.       #Possible Abdominal infection-Improved  -Given 2g Cefepime, 1g Meropenem, and 2g Vancomycin in ED   -Imagin/21 CT CAP- likely fecal impaction with stercoral proctitis  -Labs: No leukocytosis.  Bcx * 2- One bottle positive for staphylococcus cohnii, anaerobic gram positive cocci in clusters. Believed to be contaminant.   -Antibiotics: Vancomycin (-). Discontinued upon negative MRSA Nares PCR. Metronidazole (- , restarted -). Aztreonam (-, restarted -) given negative Bcx and Ucx         #Hypernatremia, resolved  -On admission : 150 ->  160 ->  151 ->  144 -> 140  Plan  -Daily RFP  -See endocrinology for additional workup of HHS     #Hyperkalemia- resolved  -On  admission 7/21: K 6.1. Given 2g Calcium Gluconate in ED  -7/22 - K dropped to 3.1- labs reflecting time patient coded. Aggressively repleted K.  -7/22: K 3.7 -> 7/23 4.5 -> 7/24 4.2 -> 7/25 3.6 -> 7/26 3.9  Plan  -Daily RFP labs  -Maintaining goal K > 4  -Will replete PRN     #Hypermagnesemia-resolved  -On admission 7/21: Mg 2.93 -> 7/24: 2.46 -> 7/25 1.94 -> 7/26 2.01  Plan  -Daily Mg labs  -Maintaining >2, <2.4  -Will replete Mg PRN     #Hypercalcemia, POA- resolved  #Hypocalcemia in setting of Hypoalbuminemia, corrected to Normal Ca  -Admission 7/21: Ca 10.7, + Protein Gap: Total Protein 8.7, Albumin 3.9.   -Original concern for multiple myeloma. SPEP: Gamma spike. UPEP: Mild proteinuria  -Calcium since 7/22-23: WNL -> 7/24: 8.0, corrected 8.6 -> 8.2  -7/22: Albumin 3.0 -> 7/23 3.9 -> 7/24 2.6 -> 7/25 2.6   SPEP: Gamma spike. 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 Monitor CBCs  -Anti-platelet factor 4 antibody screen     FEN  ·Fluids: Free water flushes  ·Electrolytes: Will replete PRN, with goals of Mg >2, K>4  ·Nutrition: Tube feeds  Prophylaxis:  ·DVT ppx: Heparin  ·         GI ppx/Bowel care: Senna/Docusate liquid, Lactulose, Suppositories   Hardware:  ·Drain: Urinary catheter  ·Lines: PIV  Social:  ·Code: Full Code - confirmed with Radha, proxy, via phone 7/21/2024  ·HPOA: Radha Osullivan (POA) - [phone] - Aunt         Staffed with attending Dr Landon during rounds       Shanna San MD

## 2024-07-28 NOTE — PROGRESS NOTES
Caryl Tucker is a 53 y.o. female on day 6 of admission presenting with Acute on chronic respiratory failure with hypercapnia (Multi).      Subjective   NAEON. Patient requesting music this morning       Objective     Last Recorded Vitals  /70   Pulse 77   Temp 36.3 °C (97.3 °F)   Resp 14   Wt 103 kg (227 lb 1.2 oz)   SpO2 95%   Intake/Output last 3 Shifts:    Intake/Output Summary (Last 24 hours) at 7/27/2024 2024  Last data filed at 7/27/2024 1800  Gross per 24 hour   Intake 300 ml   Output 300 ml   Net 0 ml       Admission Weight  Weight: 93 kg (205 lb) (07/21/24 1046)    Daily Weight  07/25/24 : 103 kg (227 lb 1.2 oz)    Image Results  XR chest 1 view  Narrative: Interpreted By:  Vamsi Allred and Ogievich Taessa   STUDY:  XR CHEST 1 VIEW;  7/25/2024 5:55 pm      INDICATION:  Signs/Symptoms:fever workup.      COMPARISON:  Chest x-ray 07/22/2024; chest, abdomen pelvis CT scan 07/21/2024      ACCESSION NUMBER(S):  [id number]      ORDERING CLINICIAN:  SHAMAR TRISTAN      FINDINGS:  AP radiograph of the chest patient is now slightly rotated towards  right.      LINES:  Interval extubation as well as removal of right IJ approach central  venous catheter and enteric tube. Dobbhoff tube is now visualized  coursing below the level of diaphragm and tip out of the field of  view.      CARDIOMEDIASTINAL SILHOUETTE:  The cardiomediastinal silhouette is grossly stable in size and  configuration.      LUNGS:  Low lung volumes with bronchovascular crowding. Improved but residual  bandlike bibasilar atelectasis, left more than right. There is no new  focal consolidation, pleural effusion or pneumothorax.      ABDOMEN:  No remarkable upper abdominal findings.      BONES:  No acute osseous abnormality.      Impression: 1. Improved but residual bibasilar atelectasis, left more than right.  No new airspace consolidation.  2. Medical devices as above.      I personally reviewed the images/study and I agree with  the findings  as stated by Wong Sexton DO, PGY-3. This study was interpreted  at [facility], [place], [place].      Signed by: Vamsi Allred 7/26/2024 7:27 AM  Dictation workstation:   [id number]      Physical Exam  Constitutional:       General: She is not in acute distress.     Appearance: She is not ill-appearing.   HENT:      Head: Normocephalic and atraumatic.      Mouth/Throat:      Mouth: Mucous membranes are moist.   Eyes:      General: No scleral icterus.  Cardiovascular:      Rate and Rhythm: Normal rate and regular rhythm.      Pulses: Normal pulses.      Heart sounds: No murmur heard.     No friction rub. No gallop.   Pulmonary:      Effort: Pulmonary effort is normal. No respiratory distress.      Breath sounds: No stridor. Rales present. No wheezing or rhonchi.   Abdominal:      General: There is no distension.      Tenderness: There is no abdominal tenderness. There is no guarding or rebound.   Musculoskeletal:      Cervical back: No rigidity or tenderness.      Right lower leg: No edema.      Left lower leg: No edema.   Skin:     Capillary Refill: Capillary refill takes less than 2 seconds.      Coloration: Skin is not jaundiced.      Findings: No bruising.   Neurological:      Mental Status: She is alert and oriented to person, place, and time.      Motor: Weakness present.   Psychiatric:         Mood and Affect: Mood normal.           Relevant Results             Results for orders placed or performed during the hospital encounter of 07/21/24 (from the past 24 hour(s))   POCT GLUCOSE   Result Value Ref Range    POCT Glucose 238 (H) 74 - 99 mg/dL   Heparin Assay, UFH   Result Value Ref Range    Heparin Unfractionated 0.2 See Comment Below for Therapeutic Ranges IU/mL   POCT GLUCOSE   Result Value Ref Range    POCT Glucose 251 (H) 74 - 99 mg/dL   CBC   Result Value Ref Range    WBC 6.6 4.4 - 11.3 x10*3/uL    nRBC 0.0 0.0 - 0.0 /100 WBCs    RBC 2.85 (L) 4.00 -  5.20 x10*6/uL    Hemoglobin 7.7 (L) 12.0 - 16.0 g/dL    Hematocrit 27.7 (L) 36.0 - 46.0 %    MCV 97 80 - 100 fL    MCH 27.0 26.0 - 34.0 pg    MCHC 27.8 (L) 32.0 - 36.0 g/dL    RDW 17.8 (H) 11.5 - 14.5 %    Platelets 67 (L) 150 - 450 x10*3/uL   POCT GLUCOSE   Result Value Ref Range    POCT Glucose 246 (H) 74 - 99 mg/dL   Type and screen   Result Value Ref Range    ABO TYPE O     Rh TYPE POS     ANTIBODY SCREEN NEG    Heparin Assay, UFH   Result Value Ref Range    Heparin Unfractionated 0.3 See Comment Below for Therapeutic Ranges IU/mL   CBC and Auto Differential   Result Value Ref Range    WBC 7.7 4.4 - 11.3 x10*3/uL    nRBC 0.5 (H) 0.0 - 0.0 /100 WBCs    RBC 3.61 (L) 4.00 - 5.20 x10*6/uL    Hemoglobin 9.8 (L) 12.0 - 16.0 g/dL    Hematocrit 31.1 (L) 36.0 - 46.0 %    MCV 86 80 - 100 fL    MCH 27.1 26.0 - 34.0 pg    MCHC 31.5 (L) 32.0 - 36.0 g/dL    RDW 17.4 (H) 11.5 - 14.5 %    Platelets 102 (L) 150 - 450 x10*3/uL    Neutrophils % 58.9 40.0 - 80.0 %    Immature Granulocytes %, Automated 1.8 (H) 0.0 - 0.9 %    Lymphocytes % 21.4 13.0 - 44.0 %    Monocytes % 13.6 2.0 - 10.0 %    Eosinophils % 3.8 0.0 - 6.0 %    Basophils % 0.5 0.0 - 2.0 %    Neutrophils Absolute 4.50 1.20 - 7.70 x10*3/uL    Immature Granulocytes Absolute, Automated 0.14 0.00 - 0.70 x10*3/uL    Lymphocytes Absolute 1.64 1.20 - 4.80 x10*3/uL    Monocytes Absolute 1.04 (H) 0.10 - 1.00 x10*3/uL    Eosinophils Absolute 0.29 0.00 - 0.70 x10*3/uL    Basophils Absolute 0.04 0.00 - 0.10 x10*3/uL   Comprehensive Metabolic Panel   Result Value Ref Range    Glucose 271 (H) 74 - 99 mg/dL    Sodium 140 136 - 145 mmol/L    Potassium 3.9 3.5 - 5.3 mmol/L    Chloride 102 98 - 107 mmol/L    Bicarbonate 27 21 - 32 mmol/L    Anion Gap 15 10 - 20 mmol/L    Urea Nitrogen 12 6 - 23 mg/dL    Creatinine 0.96 0.50 - 1.05 mg/dL    eGFR 70 >60 mL/min/1.73m*2    Calcium 8.8 8.6 - 10.6 mg/dL    Albumin 2.8 (L) 3.4 - 5.0 g/dL    Alkaline Phosphatase 117 (H) 33 - 110 U/L    Total  Protein 6.2 (L) 6.4 - 8.2 g/dL     (H) 9 - 39 U/L    Bilirubin, Total 0.4 0.0 - 1.2 mg/dL    ALT 57 (H) 7 - 45 U/L   Magnesium   Result Value Ref Range    Magnesium 1.94 1.60 - 2.40 mg/dL   Phosphorus   Result Value Ref Range    Phosphorus 2.9 2.5 - 4.9 mg/dL   Bilirubin, Direct   Result Value Ref Range    Bilirubin, Direct 0.1 0.0 - 0.3 mg/dL   Coagulation Screen   Result Value Ref Range    Protime 11.8 9.8 - 12.8 seconds    INR 1.0 0.9 - 1.1    aPTT 58 (H) 27 - 38 seconds   Heparin Assay, UFH   Result Value Ref Range    Heparin Unfractionated 0.3 See Comment Below for Therapeutic Ranges IU/mL   POCT GLUCOSE   Result Value Ref Range    POCT Glucose 193 (H) 74 - 99 mg/dL   POCT GLUCOSE   Result Value Ref Range    POCT Glucose 251 (H) 74 - 99 mg/dL      Assessment/Plan   Principal Problem:    Acute on chronic respiratory failure with hypercapnia (Multi)  Active Problems:    Rash and nonspecific skin eruption    Caryl Tucker is a 53 y.o. year old female patient w/ a PMH significant for PMH of CVA c/b left hemiparesis and dysphagia (2016) s/p PEG placement (2016, replaced 5 times, removed unknown date), HTN, PE/DVT on Eliquis, who was admitted to the MICU on 07/21/24 for acute hypercapnic respiratory failure requiring intubation, encephalopathy, hyperglycemia, and hypernatremia. Vfib arrest occurred 7/22- ROSC achieved. Patient currently off pressers, sedation, insulin drip, and extubated. Patient with new onset thrombocytopenia. PFO4 negative. Other possibilities include drug induced thrombocytopenia.    7/27/2024 updates  -Platelets continuing to downtrend but PFO4 came back negative  -Add Lispro 5u TID       #Shock, requiring pressors, ddx: cardiovascular secondary to vfib arrest vs hypovolemic secondary to HHS  #S/P Vfib arrest 7/22, likely secondary to hypokalemia vs less likely ischemic event  #Elevated troponin s/p arrest, improving  -Home medications: HCTZ 12.5mg, Lisinopril 20mg   -Admission 7/21:  Troponin 28, BNP 6  -Admission 7/21: TTE: preserved EF with no concerning wall motion abnormalities  -7/22: V.fib cardiac arrest. Required 3 shocks, epi x 3, amiodarone 300mg, calcium x 1 , HCO3 x 1 during the code. Patient achieved ROSC and was following commands   -S/P v.fib cardiac arrest 7/22- Trop: 304 ->955 -> 709  Plan:  -Elevated lactate on presentation. Now wnl.  -Cardiology consulted  -7/23 began ASA 81mg daily, Atorvastatin 40 secondary to possible ischemic event  7/24: Discontinued Pressers  7/25: Pressors were intermittently needed overnight. Discontinued this morning.  Plan:  -Holding home medications in setting of ZULMA  -Per Cardiology recommendations, will angiogram on 7/29  -DVT management per endocrinology section  -Beginning Midodrine 7/25     #HHS in the setting of hyperglycemia and hypernatremia- resolved  -Admission 7/21: Glucose 1,498, Na 150  -Admission Relevant labs: Beta Hydroxybutyrate 0.24, TSH 3.43, Lactate 3.5, HA1C: 12.1  -Urine Osmolality: 455 (nl)  -Urine Electrolytes:  Na 91, K 37, Cr 23.9, Chloride 108; abnormal: chloride/Cr ratio: 452  -[facility] Protocol Insulin: Drip begun in ED, held 7/22 in setting of hypokalemia, resumed 7/23 and titrated down, discontinued 7/23. Current management: Lantus 10 and Moderate ISS  -[facility] Protocol Fluids: Current management includes 0.45% NaCl and free water flushes. Osmolar gap closed, discontinued D5W  -Trending glucose and Na: 7/23: Glucose 147, Na 145  -Trending Serum Osmolality: 7/21 416 -> 369 -> 344 -> 7/24 319  -Consulted nephrology, following recommendations  Plan:  -Daily RFP  -Continuing Lantus 10 and Moderate ISS. Start Lispro 5u TID  -Fluids: free water flushes  -Nutrition following for tube feed recommendation    #Thrombocytopenia  -Trend CBC  - Follow up peripheral smear     #Acute Hypoxic Hypercapnic Respiratory Failure, improving  -Admission 7/21:  Bicarbonate 35  -7/21 intubated on admission  -7/23: Extubated  -7/25: No oxygen  requirement, at baseline bicarbonate  Plan:  -Monitoring post extubation with daily VBGs and vitals     #H/o Gastrostomy Tube  #Concern Fecal Impaction on Imaging- improved  -Admission: No PEG tube in place, healed abdominal scar noted.  - CT CAP: markedly distended rectum with mild thickening of the lower rectum and anorectal junction with large stool ball and mesorectal fat stranding, likely representing fecal impaction with stercoral proctitis  -Home medication: On Senna   -: received 1 enema. Did not tolerate subsequent enemas. Unable to maintain fluid in rectum for efficient use.  -No bowel movement since  during code. Passing gas.  - SLP Eval s/p extubation. Patient recommended to be strict NPO with aggressive oral care.  -: Manual fecal disimpaction. Multiple bowel movements after  -: Overnight, only watery stool noted by nurse. F/U  KUB demonstrated nonobstructive bowel-gas pattern. Adjusted bowel regimen.  Plan:  -Bowel regimen: senna/docusate, Miralax daily (Lactulose discontinued)  -Nutrition following for tube feed recommendations     #ZULMA, improved, near baseline  -: BUN 20s, Cr 0.85,   -On admission: BUN 80, Cr 3.57, AG 19, Egfr 14  -: Improving labs: BUN 21, Cr: 1.14, Egfr 57  Plan:  -Nephrology signed off on   -Avoiding nephrotoxic medications  -For fluid management, see Endocrinology     #H/o DVT/PE, on Eliquis at home  -Admission : INR 1.7, PT 19.2, labs likely reflect chronic Eliquis usage  -Admission : Held Eliquis  -Admission  CT- dilated main pulmonary artery-up to 4cm  -: restarted Heparin inpatient  -Cardiology Following  Plan:  -Continuing Heparin inpatient.   -Resume Eliquis upon discharge.     #COVID   -Imagin/21 CXR- bibasilar patchy airspace disease, worse at left lung base, small left effusion  -Labs: No leukocytosis during admission  -Symptoms: Patient denies cough or URI symptoms  -Vancomycin (-). Discontinued upon  negative MRSA Nares PCR.       #Possible Abdominal infection-Improved  -Given 2g Cefepime, 1g Meropenem, and 2g Vancomycin in ED   -Imagin/21 CT CAP- likely fecal impaction with stercoral proctitis  -Labs: No leukocytosis.  Bcx * 2- One bottle positive for staphylococcus cohnii, anaerobic gram positive cocci in clusters. Believed to be contaminant.   -Antibiotics: Vancomycin (-). Discontinued upon negative MRSA Nares PCR. Metronidazole (- , restarted ). Aztreonam (-, restarted )        #Hypernatremia, resolved  -On admission : 150 ->  160 ->  151 ->  144 -> 140  Plan  -Daily RFP  -See endocrinology for additional workup of HHS     #Hyperkalemia- resolved  -On admission : K 6.1. Given 2g Calcium Gluconate in ED  - - K dropped to 3.1- labs reflecting time patient coded. Aggressively repleted K.  -: K 3.7 ->  4.5 ->  4.2 ->  3.6 ->  3.9  Plan  -Daily RFP labs  -Maintaining goal K > 4  -Will replete PRN     #Hypermagnesemia-resolved  -On admission : Mg 2.93 -> : 2.46 ->  1.94 ->  2.01  Plan  -Daily Mg labs  -Maintaining >2, <2.4  -Will replete Mg PRN     #Hypercalcemia, POA- resolved  #Hypocalcemia in setting of Hypoalbuminemia, corrected to Normal Ca  -Admission : Ca 10.7, + Protein Gap: Total Protein 8.7, Albumin 3.9.   -Original concern for multiple myeloma. SPEP: Gamma spike. UPEP: Mild proteinuria  -Calcium since -: WNL -> : 8.0, corrected 8.6 -> 8.2  -: Albumin 3.0 ->  3.9 ->  2.6 ->  2.6   SPEP: Gamma spike. 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 Monitor CBCs  -Anti-platelet factor 4 antibody screen     FEN  ·Fluids: Free water flushes  ·Electrolytes: Will replete PRN, with goals of Mg >2, K>4  ·Nutrition: Tube feeds  Prophylaxis:  ·DVT ppx: Heparin  ·         GI ppx/Bowel care: Senna/Docusate liquid, Lactulose, Suppositories   Hardware:  ·Drain: Urinary catheter  ·Lines: PIV  Social:  ·Code: Full Code - confirmed with Radha, proxy, via phone 7/21/2024  ·HPOA: Radha Osullivan (POA) - [phone] - Aunt       Plan is not final until fully discussed with Attending- Mahin Zhong MD.       Chase Woo MD  Internal Medicine-Pediatrics PGY2  Epic Chat

## 2024-07-28 NOTE — CARE PLAN
The patient's goals for the shift include      The clinical goals for the shift include Patient will remain hemodynamically stable this shift.      Problem: Skin  Goal: Participates in plan/prevention/treatment measures  Outcome: Progressing  Flowsheets (Taken 7/28/2024 0801)  Participates in plan/prevention/treatment measures: Elevate heels  Goal: Prevent/manage excess moisture  Outcome: Progressing  Flowsheets (Taken 7/28/2024 0801)  Prevent/manage excess moisture: Moisturize dry skin  Goal: Prevent/minimize sheer/friction injuries  Outcome: Progressing  Flowsheets (Taken 7/28/2024 0801)  Prevent/minimize sheer/friction injuries: HOB 30 degrees or less  Goal: Promote/optimize nutrition  Outcome: Progressing  Flowsheets (Taken 7/28/2024 0801)  Promote/optimize nutrition: Assist with feeding  Goal: Promote skin healing  Outcome: Progressing  Flowsheets (Taken 7/28/2024 0801)  Promote skin healing: Turn/reposition every 2 hours/use positioning/transfer devices     Problem: Fall/Injury  Goal: Not fall by end of shift  Outcome: Progressing  Goal: Be free from injury by end of the shift  Outcome: Progressing  Goal: Verbalize understanding of personal risk factors for fall in the hospital  Outcome: Progressing  Goal: Verbalize understanding of risk factor reduction measures to prevent injury from fall in the home  Outcome: Progressing  Goal: Use assistive devices by end of the shift  Outcome: Progressing  Goal: Pace activities to prevent fatigue by end of the shift  Outcome: Progressing     Problem: Pain - Adult  Goal: Verbalizes/displays adequate comfort level or baseline comfort level  Outcome: Progressing

## 2024-07-29 LAB
ABO GROUP (TYPE) IN BLOOD: NORMAL
ALBUMIN SERPL BCP-MCNC: 2.9 G/DL (ref 3.4–5)
ALP SERPL-CCNC: 103 U/L (ref 33–110)
ALT SERPL W P-5'-P-CCNC: 55 U/L (ref 7–45)
ANION GAP SERPL CALC-SCNC: 10 MMOL/L (ref 10–20)
ANTIBODY SCREEN: NORMAL
APTT PPP: 80 SECONDS (ref 27–38)
AST SERPL W P-5'-P-CCNC: 79 U/L (ref 9–39)
BACTERIA BLD CULT: NORMAL
BACTERIA BLD CULT: NORMAL
BASOPHILS # BLD MANUAL: 0.11 X10*3/UL (ref 0–0.1)
BASOPHILS NFR BLD MANUAL: 0.9 %
BILIRUB DIRECT SERPL-MCNC: 0.3 MG/DL (ref 0–0.3)
BILIRUB SERPL-MCNC: 0.5 MG/DL (ref 0–1.2)
BUN SERPL-MCNC: 7 MG/DL (ref 6–23)
CALCIUM SERPL-MCNC: 8.8 MG/DL (ref 8.6–10.6)
CHLORIDE SERPL-SCNC: 101 MMOL/L (ref 98–107)
CO2 SERPL-SCNC: 32 MMOL/L (ref 21–32)
CREAT SERPL-MCNC: 0.81 MG/DL (ref 0.5–1.05)
EGFRCR SERPLBLD CKD-EPI 2021: 86 ML/MIN/1.73M*2
EOSINOPHIL # BLD MANUAL: 0.2 X10*3/UL (ref 0–0.7)
EOSINOPHIL NFR BLD MANUAL: 1.7 %
ERYTHROCYTE [DISTWIDTH] IN BLOOD BY AUTOMATED COUNT: 17.7 % (ref 11.5–14.5)
ERYTHROCYTE [DISTWIDTH] IN BLOOD BY AUTOMATED COUNT: 17.9 % (ref 11.5–14.5)
GLUCOSE BLD MANUAL STRIP-MCNC: 137 MG/DL (ref 74–99)
GLUCOSE BLD MANUAL STRIP-MCNC: 143 MG/DL (ref 74–99)
GLUCOSE BLD MANUAL STRIP-MCNC: 159 MG/DL (ref 74–99)
GLUCOSE BLD MANUAL STRIP-MCNC: 164 MG/DL (ref 74–99)
GLUCOSE SERPL-MCNC: 158 MG/DL (ref 74–99)
HCT VFR BLD AUTO: 31.4 % (ref 36–46)
HCT VFR BLD AUTO: 34.6 % (ref 36–46)
HGB BLD-MCNC: 10.4 G/DL (ref 12–16)
HGB BLD-MCNC: 9.5 G/DL (ref 12–16)
IMM GRANULOCYTES # BLD AUTO: 1.03 X10*3/UL (ref 0–0.7)
IMM GRANULOCYTES NFR BLD AUTO: 8.8 % (ref 0–0.9)
INR PPP: 1 (ref 0.9–1.1)
LYMPHOCYTES # BLD MANUAL: 2.34 X10*3/UL (ref 1.2–4.8)
LYMPHOCYTES NFR BLD MANUAL: 20 %
MAGNESIUM SERPL-MCNC: 1.61 MG/DL (ref 1.6–2.4)
MCH RBC QN AUTO: 27.3 PG (ref 26–34)
MCH RBC QN AUTO: 27.4 PG (ref 26–34)
MCHC RBC AUTO-ENTMCNC: 30.1 G/DL (ref 32–36)
MCHC RBC AUTO-ENTMCNC: 30.3 G/DL (ref 32–36)
MCV RBC AUTO: 90 FL (ref 80–100)
MCV RBC AUTO: 91 FL (ref 80–100)
METAMYELOCYTES # BLD MANUAL: 0.3 X10*3/UL
METAMYELOCYTES NFR BLD MANUAL: 2.6 %
MONOCYTES # BLD MANUAL: 0.51 X10*3/UL (ref 0.1–1)
MONOCYTES NFR BLD MANUAL: 4.4 %
MYELOCYTES # BLD MANUAL: 0.2 X10*3/UL
MYELOCYTES NFR BLD MANUAL: 1.7 %
NEUTROPHILS # BLD MANUAL: 8.04 X10*3/UL (ref 1.2–7.7)
NEUTS BAND # BLD MANUAL: 0.11 X10*3/UL (ref 0–0.7)
NEUTS BAND NFR BLD MANUAL: 0.9 %
NEUTS SEG # BLD MANUAL: 7.93 X10*3/UL (ref 1.2–7)
NEUTS SEG NFR BLD MANUAL: 67.8 %
NRBC BLD-RTO: 3.6 /100 WBCS (ref 0–0)
NRBC BLD-RTO: 4.2 /100 WBCS (ref 0–0)
PHOSPHATE SERPL-MCNC: 3 MG/DL (ref 2.5–4.9)
PLATELET # BLD AUTO: 185 X10*3/UL (ref 150–450)
PLATELET # BLD AUTO: 197 X10*3/UL (ref 150–450)
POTASSIUM SERPL-SCNC: 3.4 MMOL/L (ref 3.5–5.3)
PROT SERPL-MCNC: 6.5 G/DL (ref 6.4–8.2)
PROTHROMBIN TIME: 11.5 SECONDS (ref 9.8–12.8)
RBC # BLD AUTO: 3.48 X10*6/UL (ref 4–5.2)
RBC # BLD AUTO: 3.79 X10*6/UL (ref 4–5.2)
RBC MORPH BLD: ABNORMAL
RH FACTOR (ANTIGEN D): NORMAL
SODIUM SERPL-SCNC: 140 MMOL/L (ref 136–145)
TOTAL CELLS COUNTED BLD: 115
UFH PPP CHRO-ACNC: 0.4 IU/ML
UFH PPP CHRO-ACNC: 0.5 IU/ML
WBC # BLD AUTO: 11.7 X10*3/UL (ref 4.4–11.3)
WBC # BLD AUTO: 12.6 X10*3/UL (ref 4.4–11.3)

## 2024-07-29 PROCEDURE — 85027 COMPLETE CBC AUTOMATED: CPT

## 2024-07-29 PROCEDURE — 80053 COMPREHEN METABOLIC PANEL: CPT

## 2024-07-29 PROCEDURE — 2500000004 HC RX 250 GENERAL PHARMACY W/ HCPCS (ALT 636 FOR OP/ED)

## 2024-07-29 PROCEDURE — 85610 PROTHROMBIN TIME: CPT

## 2024-07-29 PROCEDURE — 1200000002 HC GENERAL ROOM WITH TELEMETRY DAILY

## 2024-07-29 PROCEDURE — 92526 ORAL FUNCTION THERAPY: CPT | Mod: GN

## 2024-07-29 PROCEDURE — 2500000002 HC RX 250 W HCPCS SELF ADMINISTERED DRUGS (ALT 637 FOR MEDICARE OP, ALT 636 FOR OP/ED)

## 2024-07-29 PROCEDURE — 82248 BILIRUBIN DIRECT: CPT

## 2024-07-29 PROCEDURE — 85007 BL SMEAR W/DIFF WBC COUNT: CPT

## 2024-07-29 PROCEDURE — 36415 COLL VENOUS BLD VENIPUNCTURE: CPT

## 2024-07-29 PROCEDURE — 2500000001 HC RX 250 WO HCPCS SELF ADMINISTERED DRUGS (ALT 637 FOR MEDICARE OP)

## 2024-07-29 PROCEDURE — 86901 BLOOD TYPING SEROLOGIC RH(D): CPT

## 2024-07-29 PROCEDURE — 82947 ASSAY GLUCOSE BLOOD QUANT: CPT

## 2024-07-29 PROCEDURE — 84100 ASSAY OF PHOSPHORUS: CPT

## 2024-07-29 PROCEDURE — 85520 HEPARIN ASSAY: CPT

## 2024-07-29 PROCEDURE — 99233 SBSQ HOSP IP/OBS HIGH 50: CPT | Performed by: NURSE PRACTITIONER

## 2024-07-29 PROCEDURE — 83735 ASSAY OF MAGNESIUM: CPT

## 2024-07-29 PROCEDURE — 99232 SBSQ HOSP IP/OBS MODERATE 35: CPT | Performed by: INTERNAL MEDICINE

## 2024-07-29 RX ORDER — MAGNESIUM SULFATE HEPTAHYDRATE 40 MG/ML
2 INJECTION, SOLUTION INTRAVENOUS ONCE
Status: COMPLETED | OUTPATIENT
Start: 2024-07-29 | End: 2024-07-29

## 2024-07-29 RX ORDER — POTASSIUM CHLORIDE 14.9 MG/ML
20 INJECTION INTRAVENOUS
Status: COMPLETED | OUTPATIENT
Start: 2024-07-29 | End: 2024-07-29

## 2024-07-29 RX ORDER — MIDODRINE HYDROCHLORIDE 10 MG/1
5 TABLET ORAL EVERY 8 HOURS
Status: CANCELLED | OUTPATIENT
Start: 2024-07-29

## 2024-07-29 ASSESSMENT — COGNITIVE AND FUNCTIONAL STATUS - GENERAL
TURNING FROM BACK TO SIDE WHILE IN FLAT BAD: TOTAL
STANDING UP FROM CHAIR USING ARMS: TOTAL
HELP NEEDED FOR BATHING: TOTAL
WALKING IN HOSPITAL ROOM: TOTAL
MOVING FROM LYING ON BACK TO SITTING ON SIDE OF FLAT BED WITH BEDRAILS: TOTAL
WALKING IN HOSPITAL ROOM: TOTAL
STANDING UP FROM CHAIR USING ARMS: TOTAL
HELP NEEDED FOR BATHING: TOTAL
PERSONAL GROOMING: TOTAL
MOVING FROM LYING ON BACK TO SITTING ON SIDE OF FLAT BED WITH BEDRAILS: TOTAL
CLIMB 3 TO 5 STEPS WITH RAILING: TOTAL
TOILETING: TOTAL
CLIMB 3 TO 5 STEPS WITH RAILING: TOTAL
TURNING FROM BACK TO SIDE WHILE IN FLAT BAD: TOTAL
TOILETING: TOTAL
DRESSING REGULAR LOWER BODY CLOTHING: TOTAL
DRESSING REGULAR UPPER BODY CLOTHING: TOTAL
DRESSING REGULAR UPPER BODY CLOTHING: TOTAL
MOVING TO AND FROM BED TO CHAIR: TOTAL
EATING MEALS: A LITTLE
EATING MEALS: A LITTLE
DAILY ACTIVITIY SCORE: 8
DAILY ACTIVITIY SCORE: 8
DRESSING REGULAR LOWER BODY CLOTHING: TOTAL
MOBILITY SCORE: 6
MOBILITY SCORE: 6
PERSONAL GROOMING: TOTAL
MOVING TO AND FROM BED TO CHAIR: TOTAL

## 2024-07-29 ASSESSMENT — PAIN SCALES - GENERAL
PAINLEVEL_OUTOF10: 0 - NO PAIN

## 2024-07-29 NOTE — PROGRESS NOTES
Progress Note 7/29/2024    History of Present Illness  Caryl Tucker is a 53 y.o. female currently on day 8 of admission for Acute on chronic respiratory failure with hypercapnia (Multi).    Subjective  Patient has no complaints this morning. Denies SOB, chest pain, abdominal pain, N/V. Says she cannot remember when her last bowel movement was.    Objective    Vitals  Visit Vitals  /87 (BP Location: Right arm, Patient Position: Lying)   Pulse 83   Temp 36.8 °C (98.2 °F) (Temporal)   Resp 16      I/O    Intake/Output Summary (Last 24 hours) at 7/29/2024 1639  Last data filed at 7/29/2024 1610  Gross per 24 hour   Intake 50 ml   Output 2100 ml   Net -2050 ml        Physical Exam  Physical Exam  Constitutional:       Appearance: Normal appearance.   Cardiovascular:      Rate and Rhythm: Normal rate and regular rhythm.      Heart sounds: Normal heart sounds.   Abdominal:      Palpations: Abdomen is soft.   Musculoskeletal:      Right lower leg: Edema present.      Left lower leg: Edema present.   Neurological:      Mental Status: She is oriented to person, place, and time.         Labs  CBC:  Results from last 7 days   Lab Units 07/29/24  0746 07/29/24  0105 07/28/24  1051   WBC AUTO x10*3/uL 11.7* 12.6* 11.0   HEMOGLOBIN g/dL 10.4* 9.5* 9.9*   PLATELETS AUTO x10*3/uL 197 185 161     BMP:  Results from last 7 days   Lab Units 07/29/24  0746 07/28/24  1051 07/27/24  1041   SODIUM mmol/L 140 138 140   POTASSIUM mmol/L 3.4* 3.7 3.9   CHLORIDE mmol/L 101 100 102   CO2 mmol/L 32 30 27   BUN mg/dL 7 10 12   CREATININE mg/dL 0.81 0.95 0.96   GLUCOSE mg/dL 158* 203* 271*     LFT:  Results from last 7 days   Lab Units 07/29/24  0746 07/28/24  1051 07/27/24  1041   AST U/L 79* 100* 109*   ALT U/L 55* 62* 57*   ALK PHOS U/L 103 104 117*   BILIRUBIN TOTAL mg/dL 0.5 0.4 0.4   BILIRUBIN DIRECT mg/dL 0.3 0.2 0.1     Cardiac:  Results from last 7 days   Lab Units 07/22/24 2031   TROPHSCMC ng/L 709*     Coag:  Results from last 7  days   Lab Units 07/29/24  0124   PROTIME seconds 11.5   APTT seconds 80*   INR  1.0     UA:   Results from last 7 days   Lab Units 07/25/24  1758   COLOR U  Yellow   PH U  5.5   SPEC GRAV UR  1.019   PROTEIN U mg/dL 30 (1+)*   BLOOD UR  1.0 (3+)*   NITRITE U  NEGATIVE   WBC UR /HPF 1-5       Last EKG  Encounter Date: 07/21/24   Electrocardiogram, 12-lead PRN ACS symptoms   Result Value    Ventricular Rate 88    Atrial Rate 88    VT Interval 158    QRS Duration 118    QT Interval 378    QTC Calculation(Bazett) 457    P Axis 71    R Axis 20    T Axis 55    QRS Count 14    Q Onset 229    P Onset 150    P Offset 211    T Offset 418    QTC Fredericia 429    Narrative    Normal sinus rhythm  Nonspecific intraventricular conduction delay  Nonspecific ST and T wave abnormality  Abnormal ECG  When compared with ECG of 22-JUL-2024 00:22,  Sinus rhythm has replaced Junctional rhythm  ST no longer depressed in Inferior leads  Non-specific change in ST segment in Anterior leads  ST less depressed in Lateral leads  Confirmed by Aris Davila ([id number]) on 7/23/2024 5:22:40 PM        Imaging    XR chest 1 view    Result Date: 7/26/2024  1. Improved but residual bibasilar atelectasis, left more than right. No new airspace consolidation. 2. Medical devices as above.   I personally reviewed the images/study and I agree with the findings as stated by Wong Sexton DO, PGY-3. This study was interpreted at [facility], [place], [place].   Signed by: Vamsi Allred 7/26/2024 7:27 AM Dictation workstation:   [id number]    XR abdomen 1 view    Result Date: 7/25/2024  1.  Nonobstructive bowel-gas pattern. 2. Devices as above. Enteric tube tip projects over the gastric body.   MACRO: None   Signed by: Gerardo Soler 7/25/2024 2:26 PM Dictation workstation:   [id number]    Inpatient Medications    Scheduled medications  aspirin, 81 mg, oral, Daily  atorvastatin, 40 mg, oral, Nightly  insulin glargine, 8 Units,  subcutaneous, Nightly  insulin lispro, 0-10 Units, subcutaneous, q6h  insulin lispro, 5 Units, subcutaneous, TID  polyethylene glycol, 17 g, oral, Daily  senna, 5 mL, oral, Nightly      Continuous medications  heparin, 0-4,000 Units/hr, Last Rate: 1,200 Units/hr (07/29/24 1055)      PRN medications  PRN medications: dextrose, glucagon, glucagon, heparin, ondansetron      Assessment/Plan   Caryl Tucker is a 53 y.o. year old female patient with PMH of CVA c/b left hemiparesis and dysphagia (2016) s/p PEG placement (2016, replaced 5 times, removed unknown date), HTN, PE/DVT on Eliquis, who was admitted to the MICU on 07/21/24 for HHS and acute hypercapnic respiratory failure requiring intubation. Vfib arrest occurred 7/22- ROSC achieved. Patient currently on the floor and on Midodrine 5mg q8h. She developed new onset thrombocytopenia, now resolved (PFO4 negative, other possibilities include drug induced thrombocytopenia). Antibiotics stopped yesterday. High LFTs also slowly improving after likely ischemic hepatopathy during cardiac arrest.    Principal Problem:  #Acute on chronic respiratory failure with hypercapnia (Multi)  #Vfib #Cardiac arrest  - Previously on Midodrine 5mg q8h  - Last BP: 147/83 - 153/89  Plan:  Holding Midodrine   Plan:  Cath lab deferred until out of Covid precautions.  Resuming feeding with NGT today, NPO at midnight.  SLT consulting, MBBS tomorrow    #COVID19  - Tested +ve on 7/21  - Asymptomatic  - Precautions in place until tomorrow    #Newly diagnosed diabetes mellitus   #HHS  Resuming Lantus 8mg and lispro 5u with meals, hold at midnight (NPO)  Continue diabetes education    #ZULMA (resolved)  Transaminases WNL   Creatinine 0.81  K 3.4  S/p Kcl 20 mEq  Monitor electrolytes    #Transaminitis  - Downtrending ( -> 79, ALT, 62 -> 55)  Monitor LFP    #Constipation  - C/w bowel regimen    Fluids: PRN  Electrolytes: PRN  Nutrition: NPO Diet; Effective midnight  Enteral feeding with NPO  Glucerna 1.5; PO/NG (by mouth/nasogastric tube); 20; 300; Water; Sterile water; Every 6 hours  Access: PRN  DVT prophylaxis: on therapeutic AC w/ Heparin gtt    Code Status: Full Code (Confirmed on admission)   Emergency Contact / NOK: Extended Emergency Contact Information  Primary Emergency Contact: Radha Osullivan  Home Phone: [phone]  Work Phone: [phone]  Relation: Power of   Secondary Emergency Contact: GreerJade blank  Mobile Phone: [phone]  Relation: Relative       Juan Abdullahi MD, MSc  Internal Medicine PGY-1    7/29/2024

## 2024-07-29 NOTE — PROGRESS NOTES
Subjective   Caryl Tucker is a 53 y.o. female on hospital day 8 reports no significant events.  Patient continues inquire about getting Dobbhoff out of her nose.        Objective     Exam     Vitals:    07/29/24 0038 07/29/24 0500 07/29/24 0726 07/29/24 1148   BP: 115/74 131/82 147/83 153/89   BP Location:   Right arm Right arm   Patient Position:   Lying Lying   Pulse: 74 80 75 88   Resp:   16 17   Temp:  35.9 °C (96.7 °F) 36.2 °C (97.2 °F) 36.3 °C (97.3 °F)   TempSrc:  Temporal Skin Skin   SpO2: 93% 94% 97%    Weight:       Height:          Intake/Output last 3 shifts:  I/O last 3 completed shifts:  In: 700 (6.8 mL/kg) [NG/GT:600; IV Piggyback:100]  Out: 1450 (14.1 mL/kg) [Urine:950 (0.3 mL/kg/hr); Emesis/NG output:500]  Weight: 103 kg     Physical Exam  Vitals reviewed.   Constitutional:       Appearance: She is obese. She is not ill-appearing or diaphoretic.   HENT:      Head: Normocephalic and atraumatic.      Nose:      Comments: Dobbhoff remains in place  Cardiovascular:      Rate and Rhythm: Normal rate and regular rhythm.      Pulses: Normal pulses.      Heart sounds: No murmur heard.     No friction rub. No gallop.   Pulmonary:      Effort: Pulmonary effort is normal.      Breath sounds: No wheezing, rhonchi or rales.      Comments: Anteriorly clear  Abdominal:      General: Bowel sounds are normal. There is no distension.      Palpations: Abdomen is soft.      Tenderness: There is no abdominal tenderness. There is no guarding or rebound.   Musculoskeletal:      Comments: 1+ bilateral lower extremity edema   Skin:     General: Skin is warm and dry.   Neurological:      Mental Status: She is alert and oriented to person, place, and time.      Comments: Left-sided weakness which is chronic   Psychiatric:         Mood and Affect: Mood normal.         Behavior: Behavior normal.            Medications   aspirin, 81 mg, oral, Daily  atorvastatin, 40 mg, oral, Nightly  insulin glargine, 8 Units, subcutaneous,  "Nightly  insulin lispro, 0-10 Units, subcutaneous, q6h  [Held by provider] insulin lispro, 5 Units, subcutaneous, TID  polyethylene glycol, 17 g, oral, Daily  potassium chloride, 20 mEq, intravenous, q2h  senna, 5 mL, oral, Nightly       PRN medications: dextrose, glucagon, glucagon, heparin, ondansetron       Labs     All new labs reviewed:  some of the basic labs as follows -     Results from last 7 days   Lab Units 07/29/24  0746 07/29/24  0105 07/28/24  1051 07/27/24  1041 07/27/24  0536 07/26/24  1219   WBC AUTO x10*3/uL 11.7* 12.6* 11.0 7.7   < > 9.0   HEMOGLOBIN g/dL 10.4* 9.5* 9.9* 9.8*   < > 10.2*   HEMATOCRIT % 34.6* 31.4* 32.7* 31.1*   < > 34.5*   PLATELETS AUTO x10*3/uL 197 185 161 102*   < > 90*   NEUTROS PCT AUTO %  --   --   --  58.9  --  63.6   LYMPHO PCT MAN % 20.0  --  16.0  --   --   --    LYMPHS PCT AUTO %  --   --   --  21.4  --  18.9   MONO PCT MAN % 4.4  --  5.0  --   --   --    MONOS PCT AUTO %  --   --   --  13.6  --  13.3   EOSINO PCT MAN % 1.7  --  1.0  --   --   --    EOS PCT AUTO %  --   --   --  3.8  --  2.9    < > = values in this interval not displayed.      Results from last 72 hours   Lab Units 07/29/24  0124 07/27/24  1041   INR  1.0 1.0   APTT seconds 80* 58*     Results from last 72 hours   Lab Units 07/29/24  0746 07/28/24  1051 07/27/24  1041   SODIUM mmol/L 140 138 140   POTASSIUM mmol/L 3.4* 3.7 3.9   CHLORIDE mmol/L 101 100 102   CO2 mmol/L 32 30 27   BUN mg/dL 7 10 12   CREATININE mg/dL 0.81 0.95 0.96     Results from last 72 hours   Lab Units 07/29/24  0746 07/28/24  1051 07/27/24  1041   ALK PHOS U/L 103 104 117*   AST U/L 79* 100* 109*   ALT U/L 55* 62* 57*   BILIRUBIN TOTAL mg/dL 0.5 0.4 0.4   BILIRUBIN DIRECT mg/dL 0.3 0.2 0.1   ALBUMIN g/dL 2.9* 2.9* 2.8*   PROTEIN TOTAL g/dL 6.5 6.4 6.2*     Results from last 72 hours   Lab Units 07/29/24  0746 07/28/24  1051 07/27/24  1041   GLUCOSE mg/dL 158* 203* 271*           No results found for: \"TR1\"  Lab Results   Component " Value Date    URINECULTURE No growth 07/25/2024    BLOODCULT No growth at 3 days 07/25/2024    BLOODCULT No growth at 3 days 07/25/2024    BLOODCULT No growth at 4 days -  FINAL REPORT 07/21/2024    BLOODCULT Staphylococcus cohnii (AA) 07/21/2024            Imaging   XR chest 1 view  Narrative: Interpreted By:  Vamsi Allred,  and Darshan Back   STUDY:  XR CHEST 1 VIEW;  7/25/2024 5:55 pm      INDICATION:  Signs/Symptoms:fever workup.      COMPARISON:  Chest x-ray 07/22/2024; chest, abdomen pelvis CT scan 07/21/2024      ACCESSION NUMBER(S):  [id number]      ORDERING CLINICIAN:  SHAMAR TRISTAN      FINDINGS:  AP radiograph of the chest patient is now slightly rotated towards  right.      LINES:  Interval extubation as well as removal of right IJ approach central  venous catheter and enteric tube. Dobbhoff tube is now visualized  coursing below the level of diaphragm and tip out of the field of  view.      CARDIOMEDIASTINAL SILHOUETTE:  The cardiomediastinal silhouette is grossly stable in size and  configuration.      LUNGS:  Low lung volumes with bronchovascular crowding. Improved but residual  bandlike bibasilar atelectasis, left more than right. There is no new  focal consolidation, pleural effusion or pneumothorax.      ABDOMEN:  No remarkable upper abdominal findings.      BONES:  No acute osseous abnormality.      Impression: 1. Improved but residual bibasilar atelectasis, left more than right.  No new airspace consolidation.  2. Medical devices as above.      I personally reviewed the images/study and I agree with the findings  as stated by Wong Sexton DO, PGY-3. This study was interpreted  at [facility], [place], [place].      Signed by: Vamsi Allred 7/26/2024 7:27 AM  Dictation workstation:   [id number]     No results found for this or any previous visit from the past 1095 days.     Encounter Date: 07/21/24   Electrocardiogram, 12-lead PRN ACS symptoms    Result Value    Ventricular Rate 88    Atrial Rate 88    MA Interval 158    QRS Duration 118    QT Interval 378    QTC Calculation(Bazett) 457    P Axis 71    R Axis 20    T Axis 55    QRS Count 14    Q Onset 229    P Onset 150    P Offset 211    T Offset 418    QTC Fredericia 429    Narrative    Normal sinus rhythm  Nonspecific intraventricular conduction delay  Nonspecific ST and T wave abnormality  Abnormal ECG  When compared with ECG of 22-JUL-2024 00:22,  Sinus rhythm has replaced Junctional rhythm  ST no longer depressed in Inferior leads  Non-specific change in ST segment in Anterior leads  ST less depressed in Lateral leads  Confirmed by Aris Davila ([id number]) on 7/23/2024 5:22:40 PM          Assessment and Plan     Newly diagnosed diabetes mellitus in the setting of HHS:  -Continue Lantus and lispro 5 units with meals and continue to titrate based on corrective sliding scale  -Continue corrective sliding scale insulin  -Continue diabetes education     V. tach arrest:  -Follow-up cardiology recommendations.  Tentatively planning left heart catheterization today once out of COVID precautions.  N.p.o.   -Continue aspirin and statin  -Continue heparin drip at this time  -Keep K greater than 4 and mag greater than 2     Transaminitis: Overall relatively mild and downtrending possibly related to V. tach arrest  -Trend LFTs     Thrombocytopenia: Resolved  -NTD     Acute renal failure: Since resolved  -Monitor renal function panel  -Discontinue midodrine     ID: COVID:  -Monitor CBC with differential off antibiotics.  Of note patient with mild leukocytosis today at 11.7 with no significant left shift  -Follow-up cultures.  Most recent blood cultures are no growth to date greater than 72 hours     GI:  -Continue bowel care     CNS:  -Physical therapy/Occupational Therapy  -Speech therapy evaluation to see if able to advance to a diet    DVT prophylaxis covered by heparin drip at this time     Dwain Landon MD       Of note the above was done with Dragon dictation system.  Note was proofread to minimize errors.

## 2024-07-29 NOTE — PROGRESS NOTES
Physical Therapy                 Therapy Communication Note    Patient Name: Caryl Tucker  MRN: [id number]  Today's Date: 7/29/2024     Discipline: Physical Therapy    Missed Visit Reason: Missed Visit Reason: Other (Comment)    Missed Time: Attempt    Comment:  Patient is a questionable historian on eval for PLOF. Discussed with TCC who spoke to facility, pt is non-ambulatory, mag lift at baseline, all total care. Patient has no skilled PT needs at this time and orders discharged.

## 2024-07-29 NOTE — PROGRESS NOTES
Caryl Tucker is a 53 y.o. female on day 8 of admission presenting with Acute on chronic respiratory failure with hypercapnia (Multi).      Subjective   ***       Objective     Last Recorded Vitals  /83 (BP Location: Right arm, Patient Position: Lying)   Pulse 75   Temp 36.2 °C (97.2 °F) (Skin)   Resp 16   Wt 103 kg (227 lb 1.2 oz)   SpO2 97%   Intake/Output last 3 Shifts:    Intake/Output Summary (Last 24 hours) at 7/29/2024 0829  Last data filed at 7/29/2024 0000  Gross per 24 hour   Intake 50 ml   Output 500 ml   Net -450 ml       Admission Weight  Weight: 93 kg (205 lb) (07/21/24 1046)    Daily Weight  07/25/24 : 103 kg (227 lb 1.2 oz)    Image Results  XR chest 1 view  Narrative: Interpreted By:  Vamsi Allred and Ogievich Taessa   STUDY:  XR CHEST 1 VIEW;  7/25/2024 5:55 pm      INDICATION:  Signs/Symptoms:fever workup.      COMPARISON:  Chest x-ray 07/22/2024; chest, abdomen pelvis CT scan 07/21/2024      ACCESSION NUMBER(S):  [id number]      ORDERING CLINICIAN:  SHAMAR TRISTAN      FINDINGS:  AP radiograph of the chest patient is now slightly rotated towards  right.      LINES:  Interval extubation as well as removal of right IJ approach central  venous catheter and enteric tube. Dobbhoff tube is now visualized  coursing below the level of diaphragm and tip out of the field of  view.      CARDIOMEDIASTINAL SILHOUETTE:  The cardiomediastinal silhouette is grossly stable in size and  configuration.      LUNGS:  Low lung volumes with bronchovascular crowding. Improved but residual  bandlike bibasilar atelectasis, left more than right. There is no new  focal consolidation, pleural effusion or pneumothorax.      ABDOMEN:  No remarkable upper abdominal findings.      BONES:  No acute osseous abnormality.      Impression: 1. Improved but residual bibasilar atelectasis, left more than right.  No new airspace consolidation.  2. Medical devices as above.      I personally reviewed the images/study  and I agree with the findings  as stated by Wong Sexton DO, PGY-3. This study was interpreted  at [facility], [place], [place].      Signed by: Vamsi Allred 7/26/2024 7:27 AM  Dictation workstation:   [id number]      Physical Exam    Relevant Results  {If you would like to pull in Medications, type .meds     If you would like to pull in Lab results for the last 24 hours, type .[id number]    If you would like to pull in Imaging results, type .imgrslt :99}      {Link to Stroke Scoring tools - Link :99}       Assessment/Plan                  Principal Problem:    Acute on chronic respiratory failure with hypercapnia (Multi)  Active Problems:    Rash and nonspecific skin eruption    Cardiac arrest with ventricular fibrillation (Multi)    ***              ROLA DORSEY

## 2024-07-29 NOTE — CARE PLAN
The patient's goals for the shift include  get these things (IV's) out of me.    The clinical goals for the shift include pt will remain hds for duration of shift ending 7/29 at 1930.    Over the shift, the patient made progress towards all goals.

## 2024-07-29 NOTE — PROGRESS NOTES
"Subjective   -130s Midodrine down to 5 mg TID  SR 80s. Brief run of PAT this AM  Denies CP/ SOB/dizziness, palpitations   Plan for C tomorrow once out of COVID precautions     Objective   Visit Vitals  /89 (BP Location: Right arm, Patient Position: Lying)   Pulse 88   Temp 36.3 °C (97.3 °F) (Skin)   Resp 17   Ht 1.803 m (5' 11\")   Wt 103 kg (227 lb 1.2 oz)   SpO2 97%   BMI 31.67 kg/m²   Smoking Status Never   BSA 2.27 m²      Physical Exam  General: awake, alert, no acute distress  HEENT: no scleral icterus, on NC, DHT clamped  CV: RRR  Resp: CTA anteriorly   Extremities: No edema  Neuo: grossly normal    Cardiographics  Transthoracic Echo (TTE) Limited 7/22:  1. The left ventricular systolic function is normal, with a visually estimated ejection fraction of 65-70%.  2. There is moderate left ventricular hypertrophy.  3. There is normal right ventricular global systolic function.  4. The pulmonary artery is not well visualized.  5. On call limited fellow echo.       Lab Review   Lab Results   Component Value Date     07/29/2024    K 3.4 (L) 07/29/2024     07/29/2024    CO2 32 07/29/2024    BUN 7 07/29/2024    CREATININE 0.81 07/29/2024    GLUCOSE 158 (H) 07/29/2024    CALCIUM 8.8 07/29/2024     Lab Results   Component Value Date    WBC 11.7 (H) 07/29/2024    HGB 10.4 (L) 07/29/2024    HCT 34.6 (L) 07/29/2024    MCV 91 07/29/2024     07/29/2024        Assessment/Plan   53F w/ CVA c/b left hemiparesis and dysphagia (2016) s/p PEG placement (2016, replaced 5 times, removed unknown date), HTN, PE/DVT on Eliquis who was sent to the ED by [facility] for evaluation of encephalopathy. She was found to have an ZULMA with hyperkalemia, hyperglycemia, glucose greater than 1400, and the CT concerning for proctitis, she was treated with antibiotics, started with insulin drip, required Levophed briefly, and was intubated for airway protection.     Around 11:52 PM, patient was noted to " Patient able to give urine sample via bedside commode. go into pulseless V-fib arrest, per telemetry review patient had increasing episodes of NSVT and bigeminy prior to this arrest. The patient required 3 shocks. She received epi x 3, amiodarone 300mg, calcium x 1 , HCO3 x 1 during the code. She  intermittently had ROSC in between episodes.      Impression   #VF arrest   #Elevated troponin  -Patient had increasing episodes of NSVT and bigeminy prior to this arrest highly suggestive of electrolyte imbalance in the setting of HHS and K 3.2 Mg 1.96. It is likely that her true potassium level was lower (6.1 on presentation)   -Troponin peaked at 955, and formal TTE preserved EF with no concerning wall motion abnormalities   -Given this less likely ischemic event, though cannot rule out ischemic cause completely   -If LHC negative for ischemia, will plan CMR to further assess etiology  - will then subsequently plan EP involvement for ICD placement for secondary prevention         Recommendations   - Agree with stopping Midodrine  - Keep K+>4 and Mg+ >2   -Would not start amio as underlying electrolyte abnormalities are being corrected   -Initially plan was to continue Heparin gtt for total 48 hours from event, to be discontinued 7/25 at 1700, however, patient has hx of DVT/PE and on eliquis. Will continue IV heparin gtt until after LHC.   -Continue asa 81 mg daily, high dose statin  -Plan for LHC tomorrow once pt is off COVID precautions. Please make NPO at MN     /Cardiology will follow  Case discussed with Dr. Angel Cotto, MAK-CNP  Cardiology Consults    Please call with any questions  Pager [phone] M-F 7a-6p; Saturday 7a-2p  Pager [phone] all other times

## 2024-07-29 NOTE — PROGRESS NOTES
Speech-Language Pathology  Adult Inpatient Swallow Treatment    Patient Name: Caryl Tucker  MRN: [id number]  Today's Date: 7/29/2024   Start Time: 1515  Stop Time: 1530  Time Calculation (min): 15    Impression:   Swallow re-assessment completed. Pt cleared for evaluation by RN. Awake/alert and upright in bed for session; no longer requiring supplemental O2. Conversant/cooperative throughout session and reports success w/ independent tsp sips water trials. Intermittent tangential speech. Weak volitional cough prior to administration of PO trials.     Trials of tsp sips water x3 and single sip water via straw x1 were given. Normal oral management and no overt s/s aspiration w/ tsp sips water x3. However, significant overt clinical s/s aspiration w/ single sip water via straw characterized by prolonged wet coughing and wet vocal quality; required ~30 sec to recover. No further trials given 2/2 c/f aspiration.     Persistently unable to determine safety/efficiency of swallow function at the bedside. Recommend MBSS to instrumentally evaluate swallow function/determine least restrictive diet; will complete as orders placed/radiology schedule permits. Continue to recommend NPO w/ frequent aggressive oral care until MBSS can be completed. 5-7 tsp sips water/hr allowed for pleasure, safe swallow stimulation, and after oral care to prevent buildup of bacteria within the oropharynx. RN/MD aware.      Recommendations:  NPO with frequent aggressive oral care.  5-7 ice chips/hr allowed for pleasure, safe swallow stimulation, and after oral care to prevent buildup of bacteria within the oropharynx  MBSS to instrumentally evaluate swallow function/determine least restrictive diet; will complete as orders placed/radiology schedule permits    Goal:   Pt will tolerate least restrictive diet with no overt clinical s/s aspiration 100% of the time.   Start Date: 7/23/24  Expected Time Frame to Meet Goal: 2-3 weeks       Plan:  SLP  Services Indicated: Yes  Frequency: 2x week  Discussed POC with patient  SLP - OK to Discharge    Pain:   0-10  0 = No pain.     Inpatient Education:  Extensive education provided to patient regarding current swallow function, recommendations/results, and POC.      Consultations/Referrals/Coordination of Services:   N/A

## 2024-07-29 NOTE — PROGRESS NOTES
Transitional Care Coordination Progress Note:  Patient discussed during interdisciplinary rounds.  Team members present: MD, TCC  Plan per Medical/Surgical team: Plan for cardiac cath today, and SLP re-eval. Pt is NPO, team is hoping they can discontinue NGT/tube feeding and advance diet.  Payer: [employer], Marlin Wade  Status: Inpatient  Discharge disposition: Intermediate Care Facility pending FOC. PT discontinued their orders as pt has no skilled PT needs at this time.  Potential Barriers: none  ADOD: 7/31  [facility] confirmed pt was non-ambulatory/total care and transferred via [facility] lift when she was at their facility, assigned PT updated. [facility] updated family is seeking placement at another ICF, and per family they have yet to receive notification from their facility since pt admitted to the hospital.     Spoke with ANUP Garcia [phone] who stated she would like for me to speak with her daughter Jade [phone] regarding facility options. Radha and Jade both updated PT is no longer recommending moderate intensity therapy, so we will be placing pt for longer term care at their new FOC. Jade provided multiple ICF preferences, referrals placed via [facility] to see if her facility options can accept. Pt will require precert prior to discharge. Care coordinator will continue to follow for discharge planning needs.     Addendum 1604: Per Naveen Celeste [facility]UP Garcia is coming to their facility this Wednesday to discuss pt returning to their facility.    Sugar Samuels RN  Transitional Care Coordinator/TCC  [id number]

## 2024-07-29 NOTE — CARE PLAN
The patient's goals for the shift include        Problem: Skin  Goal: Participates in plan/prevention/treatment measures  Outcome: Progressing  Flowsheets (Taken 7/29/2024 0449)  Participates in plan/prevention/treatment measures: Elevate heels  Goal: Prevent/manage excess moisture  Outcome: Progressing  Flowsheets (Taken 7/29/2024 0449)  Prevent/manage excess moisture: Moisturize dry skin  Goal: Prevent/minimize sheer/friction injuries  Outcome: Progressing  Flowsheets (Taken 7/29/2024 0449)  Prevent/minimize sheer/friction injuries: Use pull sheet  Goal: Promote/optimize nutrition  Outcome: Progressing  Flowsheets (Taken 7/29/2024 0449)  Promote/optimize nutrition: Consume > 50% meals/supplements  Goal: Promote skin healing  Outcome: Progressing  Flowsheets (Taken 7/29/2024 0449)  Promote skin healing: Turn/reposition every 2 hours/use positioning/transfer devices     Problem: Fall/Injury  Goal: Not fall by end of shift  Outcome: Progressing  Goal: Be free from injury by end of the shift  Outcome: Progressing  Goal: Verbalize understanding of personal risk factors for fall in the hospital  Outcome: Progressing  Goal: Verbalize understanding of risk factor reduction measures to prevent injury from fall in the home  Outcome: Progressing  Goal: Use assistive devices by end of the shift  Outcome: Progressing  Goal: Pace activities to prevent fatigue by end of the shift  Outcome: Progressing     Problem: Pain - Adult  Goal: Verbalizes/displays adequate comfort level or baseline comfort level  Outcome: Progressing     Problem: Safety - Adult  Goal: Free from fall injury  Outcome: Progressing     Problem: Discharge Planning  Goal: Discharge to home or other facility with appropriate resources  Outcome: Progressing     Problem: Chronic Conditions and Co-morbidities  Goal: Patient's chronic conditions and co-morbidity symptoms are monitored and maintained or improved  Outcome: Progressing     Problem: Chronic Conditions  and Co-morbidities  Goal: Patient's chronic conditions and co-morbidity symptoms are monitored and maintained or improved  Outcome: Progressing     Problem: Diabetes  Goal: Achieve decreasing blood glucose levels by end of shift  Outcome: Progressing  Goal: Increase stability of blood glucose readings by end of shift  Outcome: Progressing  Goal: Decrease in ketones present in urine by end of shift  Outcome: Progressing  Goal: Maintain electrolyte levels within acceptable range throughout shift  Outcome: Progressing  Goal: Maintain glucose levels >70mg/dl to <250mg/dl throughout shift  Outcome: Progressing  Goal: No changes in neurological exam by end of shift  Outcome: Progressing  Goal: Learn about and adhere to nutrition recommendations by end of shift  Outcome: Progressing  Goal: Vital signs within normal range for age by end of shift  Outcome: Progressing  Goal: Increase self care and/or family involovement by end of shift  Outcome: Progressing  Goal: Receive DSME education by end of shift  Outcome: Progressing

## 2024-07-30 ENCOUNTER — APPOINTMENT (OUTPATIENT)
Dept: RADIOLOGY | Facility: HOSPITAL | Age: 54
DRG: 208 | End: 2024-07-30
Payer: MEDICARE

## 2024-07-30 PROBLEM — I25.9 ISCHEMIC HEART DISEASE: Status: ACTIVE | Noted: 2024-07-21

## 2024-07-30 LAB
ALBUMIN SERPL BCP-MCNC: 2.9 G/DL (ref 3.4–5)
ALP SERPL-CCNC: 103 U/L (ref 33–110)
ALT SERPL W P-5'-P-CCNC: 53 U/L (ref 7–45)
ANION GAP SERPL CALC-SCNC: 15 MMOL/L (ref 10–20)
AST SERPL W P-5'-P-CCNC: 84 U/L (ref 9–39)
BASOPHILS # BLD MANUAL: 0.1 X10*3/UL (ref 0–0.1)
BASOPHILS NFR BLD MANUAL: 0.9 %
BILIRUB DIRECT SERPL-MCNC: 0.2 MG/DL (ref 0–0.3)
BILIRUB SERPL-MCNC: 0.5 MG/DL (ref 0–1.2)
BUN SERPL-MCNC: 5 MG/DL (ref 6–23)
BURR CELLS BLD QL SMEAR: ABNORMAL
CALCIUM SERPL-MCNC: 8.5 MG/DL (ref 8.6–10.6)
CHLORIDE SERPL-SCNC: 100 MMOL/L (ref 98–107)
CO2 SERPL-SCNC: 28 MMOL/L (ref 21–32)
CREAT SERPL-MCNC: 0.78 MG/DL (ref 0.5–1.05)
EGFRCR SERPLBLD CKD-EPI 2021: 90 ML/MIN/1.73M*2
EOSINOPHIL # BLD MANUAL: 0.3 X10*3/UL (ref 0–0.7)
EOSINOPHIL NFR BLD MANUAL: 2.6 %
ERYTHROCYTE [DISTWIDTH] IN BLOOD BY AUTOMATED COUNT: 18.6 % (ref 11.5–14.5)
GLUCOSE BLD MANUAL STRIP-MCNC: 123 MG/DL (ref 74–99)
GLUCOSE BLD MANUAL STRIP-MCNC: 126 MG/DL (ref 74–99)
GLUCOSE BLD MANUAL STRIP-MCNC: 139 MG/DL (ref 74–99)
GLUCOSE BLD MANUAL STRIP-MCNC: 140 MG/DL (ref 74–99)
GLUCOSE BLD MANUAL STRIP-MCNC: 141 MG/DL (ref 74–99)
GLUCOSE BLD MANUAL STRIP-MCNC: 149 MG/DL (ref 74–99)
GLUCOSE SERPL-MCNC: 154 MG/DL (ref 74–99)
HCT VFR BLD AUTO: 33.7 % (ref 36–46)
HGB BLD-MCNC: 10.2 G/DL (ref 12–16)
HOLD SPECIMEN: NORMAL
IMM GRANULOCYTES # BLD AUTO: 1.03 X10*3/UL (ref 0–0.7)
IMM GRANULOCYTES NFR BLD AUTO: 9.1 % (ref 0–0.9)
LYMPHOCYTES # BLD MANUAL: 1.77 X10*3/UL (ref 1.2–4.8)
LYMPHOCYTES NFR BLD MANUAL: 15.5 %
MAGNESIUM SERPL-MCNC: 1.75 MG/DL (ref 1.6–2.4)
MCH RBC QN AUTO: 26.8 PG (ref 26–34)
MCHC RBC AUTO-ENTMCNC: 30.3 G/DL (ref 32–36)
MCV RBC AUTO: 89 FL (ref 80–100)
MONOCYTES # BLD MANUAL: 0.88 X10*3/UL (ref 0.1–1)
MONOCYTES NFR BLD MANUAL: 7.7 %
MYELOCYTES # BLD MANUAL: 0.79 X10*3/UL
MYELOCYTES NFR BLD MANUAL: 6.9 %
NEUTROPHILS # BLD MANUAL: 7.57 X10*3/UL (ref 1.2–7.7)
NEUTS BAND # BLD MANUAL: 0.1 X10*3/UL (ref 0–0.7)
NEUTS BAND NFR BLD MANUAL: 0.9 %
NEUTS SEG # BLD MANUAL: 7.47 X10*3/UL (ref 1.2–7)
NEUTS SEG NFR BLD MANUAL: 65.5 %
NRBC BLD-RTO: 4.7 /100 WBCS (ref 0–0)
PHOSPHATE SERPL-MCNC: 3.3 MG/DL (ref 2.5–4.9)
PLATELET # BLD AUTO: 232 X10*3/UL (ref 150–450)
POLYCHROMASIA BLD QL SMEAR: ABNORMAL
POTASSIUM SERPL-SCNC: 3.5 MMOL/L (ref 3.5–5.3)
PROT SERPL-MCNC: 6.4 G/DL (ref 6.4–8.2)
RBC # BLD AUTO: 3.8 X10*6/UL (ref 4–5.2)
RBC MORPH BLD: ABNORMAL
SODIUM SERPL-SCNC: 139 MMOL/L (ref 136–145)
TOTAL CELLS COUNTED BLD: 116
UFH PPP CHRO-ACNC: 0.5 IU/ML
WBC # BLD AUTO: 11.4 X10*3/UL (ref 4.4–11.3)

## 2024-07-30 PROCEDURE — 74230 X-RAY XM SWLNG FUNCJ C+: CPT

## 2024-07-30 PROCEDURE — 92611 MOTION FLUOROSCOPY/SWALLOW: CPT | Mod: GN

## 2024-07-30 PROCEDURE — 82947 ASSAY GLUCOSE BLOOD QUANT: CPT

## 2024-07-30 PROCEDURE — 99233 SBSQ HOSP IP/OBS HIGH 50: CPT | Performed by: NURSE PRACTITIONER

## 2024-07-30 PROCEDURE — 80069 RENAL FUNCTION PANEL: CPT

## 2024-07-30 PROCEDURE — 2500000002 HC RX 250 W HCPCS SELF ADMINISTERED DRUGS (ALT 637 FOR MEDICARE OP, ALT 636 FOR OP/ED)

## 2024-07-30 PROCEDURE — 85027 COMPLETE CBC AUTOMATED: CPT

## 2024-07-30 PROCEDURE — 1200000002 HC GENERAL ROOM WITH TELEMETRY DAILY

## 2024-07-30 PROCEDURE — 74230 X-RAY XM SWLNG FUNCJ C+: CPT | Performed by: RADIOLOGY

## 2024-07-30 PROCEDURE — 2500000001 HC RX 250 WO HCPCS SELF ADMINISTERED DRUGS (ALT 637 FOR MEDICARE OP)

## 2024-07-30 PROCEDURE — 85007 BL SMEAR W/DIFF WBC COUNT: CPT

## 2024-07-30 PROCEDURE — 82247 BILIRUBIN TOTAL: CPT

## 2024-07-30 PROCEDURE — 83735 ASSAY OF MAGNESIUM: CPT

## 2024-07-30 PROCEDURE — 2500000005 HC RX 250 GENERAL PHARMACY W/O HCPCS: Performed by: INTERNAL MEDICINE

## 2024-07-30 PROCEDURE — 36415 COLL VENOUS BLD VENIPUNCTURE: CPT

## 2024-07-30 PROCEDURE — 82248 BILIRUBIN DIRECT: CPT

## 2024-07-30 PROCEDURE — 85520 HEPARIN ASSAY: CPT

## 2024-07-30 PROCEDURE — 92526 ORAL FUNCTION THERAPY: CPT | Mod: GN

## 2024-07-30 PROCEDURE — 2500000004 HC RX 250 GENERAL PHARMACY W/ HCPCS (ALT 636 FOR OP/ED)

## 2024-07-30 PROCEDURE — 99232 SBSQ HOSP IP/OBS MODERATE 35: CPT | Performed by: INTERNAL MEDICINE

## 2024-07-30 RX ORDER — METOPROLOL TARTRATE 50 MG/1
100 TABLET ORAL ONCE AS NEEDED
Status: DISCONTINUED | OUTPATIENT
Start: 2024-07-30 | End: 2024-07-30

## 2024-07-30 RX ORDER — METOPROLOL TARTRATE 50 MG/1
100 TABLET ORAL ONCE
Status: COMPLETED | OUTPATIENT
Start: 2024-07-30 | End: 2024-07-30

## 2024-07-30 RX ORDER — METOPROLOL TARTRATE 1 MG/ML
5 INJECTION, SOLUTION INTRAVENOUS ONCE AS NEEDED
Status: DISCONTINUED | OUTPATIENT
Start: 2024-07-30 | End: 2024-08-02

## 2024-07-30 RX ORDER — METOPROLOL TARTRATE 1 MG/ML
5 INJECTION, SOLUTION INTRAVENOUS ONCE
Status: DISCONTINUED | OUTPATIENT
Start: 2024-07-30 | End: 2024-08-02

## 2024-07-30 RX ORDER — METOPROLOL TARTRATE 50 MG/1
100 TABLET ORAL ONCE AS NEEDED
Status: DISCONTINUED | OUTPATIENT
Start: 2024-07-31 | End: 2024-08-02

## 2024-07-30 RX ORDER — LORAZEPAM 2 MG/ML
0.5 INJECTION INTRAMUSCULAR EVERY 5 MIN PRN
Status: DISCONTINUED | OUTPATIENT
Start: 2024-07-30 | End: 2024-08-02

## 2024-07-30 RX ORDER — METOPROLOL TARTRATE 50 MG/1
100 TABLET ORAL DAILY
Status: DISCONTINUED | OUTPATIENT
Start: 2024-07-31 | End: 2024-07-30

## 2024-07-30 RX ORDER — METOPROLOL TARTRATE 50 MG/1
100 TABLET ORAL DAILY
Status: ACTIVE | OUTPATIENT
Start: 2024-07-31 | End: 2024-08-01

## 2024-07-30 RX ORDER — NITROGLYCERIN 0.4 MG/1
0.8 TABLET SUBLINGUAL ONCE
Status: DISCONTINUED | OUTPATIENT
Start: 2024-07-30 | End: 2024-08-02

## 2024-07-30 ASSESSMENT — PAIN - FUNCTIONAL ASSESSMENT: PAIN_FUNCTIONAL_ASSESSMENT: 0-10

## 2024-07-30 ASSESSMENT — COGNITIVE AND FUNCTIONAL STATUS - GENERAL
DAILY ACTIVITIY SCORE: 8
MOBILITY SCORE: 6
DRESSING REGULAR UPPER BODY CLOTHING: TOTAL
CLIMB 3 TO 5 STEPS WITH RAILING: TOTAL
MOVING FROM LYING ON BACK TO SITTING ON SIDE OF FLAT BED WITH BEDRAILS: TOTAL
MOVING TO AND FROM BED TO CHAIR: TOTAL
STANDING UP FROM CHAIR USING ARMS: TOTAL
DRESSING REGULAR LOWER BODY CLOTHING: TOTAL
HELP NEEDED FOR BATHING: TOTAL
PERSONAL GROOMING: TOTAL
TURNING FROM BACK TO SIDE WHILE IN FLAT BAD: TOTAL
WALKING IN HOSPITAL ROOM: TOTAL
EATING MEALS: A LITTLE
TOILETING: TOTAL

## 2024-07-30 ASSESSMENT — PAIN SCALES - GENERAL
PAINLEVEL_OUTOF10: 0 - NO PAIN
PAINLEVEL_OUTOF10: 0 - NO PAIN

## 2024-07-30 NOTE — CARE PLAN
The patient's goals for the shift include      The clinical goals for the shift include pt will remain HDS during shift    Pt was HDS during shift

## 2024-07-30 NOTE — PROGRESS NOTES
Progress Note 7/30/2024:    History of Present Illness  Caryl Tucker is a 53 y.o. female currently on day 9 of admission (after 4d 3h in ICU) for HHS and acute hypercapnic respiratory failure requiring intubation c/b Vfib cardiac arrest.    Subjective  Patient took out her NG tube yesterday evening.   LHC canceled due to concern for ability to take PO meds i.e. Plavix. Coronary CTA planned for tomorrow per cardiology recs.   Patient was seen by SLT, and states being against getting a feeding tube. Interested in a GOC discussion. Recommendation made for NPO w/ alternate means nutrition/hydration + purees/pudding for pleasure and/or bolus-driven therapy.    No complaints or symptoms otherwise.    Objective    Vitals  Visit Vitals  /77   Pulse 72   Temp 36.4 °C (97.5 °F)   Resp 18        I/O    Intake/Output Summary (Last 24 hours) at 7/30/2024 1626  Last data filed at 7/30/2024 1450  Gross per 24 hour   Intake 2140.27 ml   Output 1200 ml   Net 940.27 ml        Physical Exam  Physical Exam    Labs  CBC:  Results from last 7 days   Lab Units 07/30/24  0829 07/29/24  0746 07/29/24  0105   WBC AUTO x10*3/uL 11.4* 11.7* 12.6*   HEMOGLOBIN g/dL 10.2* 10.4* 9.5*   PLATELETS AUTO x10*3/uL 232 197 185     BMP:  Results from last 7 days   Lab Units 07/30/24  0828 07/29/24  0746 07/28/24  1051   SODIUM mmol/L 139 140 138   POTASSIUM mmol/L 3.5 3.4* 3.7   CHLORIDE mmol/L 100 101 100   CO2 mmol/L 28 32 30   BUN mg/dL 5* 7 10   CREATININE mg/dL 0.78 0.81 0.95   GLUCOSE mg/dL 154* 158* 203*     LFT:  Results from last 7 days   Lab Units 07/30/24  0828 07/29/24  0746 07/28/24  1051   AST U/L 84* 79* 100*   ALT U/L 53* 55* 62*   ALK PHOS U/L 103 103 104   BILIRUBIN TOTAL mg/dL 0.5 0.5 0.4   BILIRUBIN DIRECT mg/dL 0.2 0.3 0.2     Cardiac:      Coag:  Results from last 7 days   Lab Units 07/29/24  0124   PROTIME seconds 11.5   APTT seconds 80*   INR  1.0     ABG:     .  UA:   Results from last 7 days   Lab Units 07/25/24  8604    COLOR U  Yellow   PH U  5.5   SPEC GRAV UR  1.019   PROTEIN U mg/dL 30 (1+)*   BLOOD UR  1.0 (3+)*   NITRITE U  NEGATIVE   WBC UR /HPF 1-5       Last EKG  Encounter Date: 07/21/24   Electrocardiogram, 12-lead PRN ACS symptoms   Result Value    Ventricular Rate 88    Atrial Rate 88    GA Interval 158    QRS Duration 118    QT Interval 378    QTC Calculation(Bazett) 457    P Axis 71    R Axis 20    T Axis 55    QRS Count 14    Q Onset 229    P Onset 150    P Offset 211    T Offset 418    QTC Fredericia 429    Narrative    Normal sinus rhythm  Nonspecific intraventricular conduction delay  Nonspecific ST and T wave abnormality  Abnormal ECG  When compared with ECG of 22-JUL-2024 00:22,  Sinus rhythm has replaced Junctional rhythm  ST no longer depressed in Inferior leads  Non-specific change in ST segment in Anterior leads  ST less depressed in Lateral leads  Confirmed by Aris Davila ([id number]) on 7/23/2024 5:22:40 PM        Imaging    XR chest 1 view    Result Date: 7/26/2024  1. Improved but residual bibasilar atelectasis, left more than right. No new airspace consolidation. 2. Medical devices as above.   I personally reviewed the images/study and I agree with the findings as stated by Wong Sexton DO, PGY-3. This study was interpreted at [facility], [place], [place].   Signed by: Vamsi Allred 7/26/2024 7:27 AM Dictation workstation:   [id number]    XR abdomen 1 view    Result Date: 7/25/2024  1.  Nonobstructive bowel-gas pattern. 2. Devices as above. Enteric tube tip projects over the gastric body.   MACRO: None   Signed by: Gerardo Soler 7/25/2024 2:26 PM Dictation workstation:   [id number]    XR abdomen 1 view    Result Date: 7/24/2024  1. Enteric tube with distal tip overlying the expected location of the middle gastric body. 2. Large rectal stool burden.   I personally reviewed the images/study and I agree with the findings as stated by Wong Sexton DO, PGY-3. This  study was interpreted at [place], [place].   MACRO: None   Signed by: Tyson Vega 7/24/2024 8:16 AM Dictation workstation:   [id number]    CT head wo IV contrast    Result Date: 7/22/2024  The examination is mildly degraded by patient motion artifact   No acute intracranial hemorrhage or mass effect.   Mild degree of nonspecific white matter hypodensity compatible with microangiopathy.   MACRO: None   Signed by: Candis Burns 7/22/2024 7:03 AM Dictation workstation:   [id number]    XR chest 1 view    Result Date: 7/22/2024  1. Interval placement of a right IJ central venous catheter with the tip projecting over lower SVC. 2. Unchanged consolidation in left lung base. Correlate with concern for infection. 3. Small left basilar pleural effusion, also stable.       Signed by: Maria Del Carmen Cardenas 7/22/2024 6:51 AM Dictation workstation:   [id number]    XR abdomen 1 view    Result Date: 7/22/2024  1.  Nonobstructive bowel gas pattern with enteric tube as described above. 2. Left lung base consolidation.   Signed by: Maria Del Carmen Cardenas 7/22/2024 6:47 AM Dictation workstation:   [id number]    XR chest 1 view    Result Date: 7/21/2024  1. OG tube tip just below the GE junction with the side hole over the distal esophagus. Advancement is recommended 2. ETT in satisfactory position 3. Bilateral patchy airspace disease at the bases worse on the left. Small left effusion as well. Pneumonia in the differential.       MACRO: None   Signed by: Medhat Espinoza 7/21/2024 5:12 PM Dictation workstation:   [id number]    XR abdomen 1 view    Result Date: 7/21/2024  1. OG tube tip just below the GE junction with the side hole over the distal esophagus. Advancement is recommended.   MACRO: None   Signed by: Medhat Espinoza 7/21/2024 5:11 PM Dictation workstation:   [id number]    CT chest abdomen pelvis wo IV contrast    Result Date: 7/21/2024  Chest 1.  No acute process in the chest. 2.  Dilated main pulmonary artery. Correlate with pulmonary arterial hypertension.   Abdomen-Pelvis 1.  Markedly distended rectum with mild thickening of the lower rectum and anorectal junction with a large stool ball and mesorectal fat stranding. Findings likely represent fecal impaction with stercoral proctitis. 2. Otherwise no acute process in the abdomen and pelvis.     MACRO: None   Signed by: Akhil Huitron 7/21/2024 2:41 PM Dictation workstation:   [id number]    CT head wo IV contrast    Result Date: 7/21/2024  Minimal cortical volume loss without hydrocephalus, hemorrhage or extra-axial collection.   MACRO: none   Signed by: Dick Darling 7/21/2024 2:09 PM Dictation workstation:   [id number]    XR chest 1 view    Result Date: 7/21/2024  1.  Left basilar dense airspace disease concerning for pneumonia versus atelectasis 2. Low lung volumes       MACRO: None   Signed by: Medhat Espinoza 7/21/2024 1:30 PM Dictation workstation:   [id number]       Inpatient Medications    Scheduled medications  aspirin, 81 mg, oral, Daily  atorvastatin, 40 mg, oral, Nightly  insulin glargine, 8 Units, subcutaneous, Nightly  insulin lispro, 0-10 Units, subcutaneous, q6h  [Held by provider] insulin lispro, 5 Units, subcutaneous, TID  metoprolol, 5 mg, intravenous, Once  [START ON 7/31/2024] metoprolol tartrate, 100 mg, oral, Daily  nitroglycerin, 0.8 mg, sublingual, Once  polyethylene glycol, 17 g, oral, Daily  senna, 5 mL, oral, Nightly      Continuous medications  heparin, 0-4,000 Units/hr, Last Rate: 1,200 Units/hr (07/30/24 0908)      PRN medications  PRN medications: dextrose, glucagon, glucagon, heparin, LORazepam, metoprolol, metoprolol, metoprolol, metoprolol, metoprolol tartrate, ondansetron      Assessment/Plan   Caryl Tucker is a 53 y.o. 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 Vfib arrest occurred 7/22- ROSC achieved. Patient currently on the floor and off respiratory and pressor support. Off antibiotics. High LFTs also slowly improving after likely ischemic hepatopathy during cardiac arrest.     Patient was seen by SLT, and states being against getting a feeding tube. Interested in a GOC discussion. Recommendation made for NPO w/ alternate means nutrition/hydration + purees/pudding for pleasure and/or bolus-driven therapy.  LHC canceled due to concern for ability to take PO meds i.e. Plavix. Coronary CTA planned for tomorrow per cardiology recs. Follow-up accordingly.    Principal Problem:  #Vfib #Cardiac arrest   #Elevated troponin  #No parenteral access  - Cardiology following  - Etiology of arrest likely electrolyte disturbances in the setting of HHS, although cannot rule out ischemic cause completely  - TTE showed preserved EF and no wall motion abnormalities  Plan:  Coronary CTA in the AM. Patient to take Metoprolol (per protocol) crushed in purees.  GOC discussion with patient/POA to determine next steps accordingly (LCH/ICD).  If CCTA negative, consider cMR  Monitor K (goal >4) and Mg (goal >2)  C/w Heparin bridge      #Dysphagia  Patient was seen by SLT, and states being against getting a feeding tube. Interested in a GOC discussion.   Recommendation made for NPO w/ alternate means nutrition/hydration + purees/pudding for pleasure and/or bolus-driven therapy.  Plan:  GOC discussion  NPO w/ alternate means nutrition/hydration + purees/pudding for pleasure and/or bolus-driven therapy.    #Transaminitis (Resolved)  - AST and ALT stable  Monitor LFTs    #ZULMA (Resolved)  - Resolved  Monitor RFPs    #COVID19 (Resolved)  Patient off precautions  Cultures negative. Slightly WBC elevation w/o left shift.  Monitor CBC w/ differential    #Newly diagnosed diabetes mellitus    #HHS  C/w Lantus 8mg + SSI    Fluids: PRN  Electrolytes: PRN  Nutrition: Adult diet Regular; Pureed 4  Access: PRN  DVT prophylaxis: Heparin subq    Code Status: Full Code (Confirmed on admission)   Emergency Contact / NOK: Extended Emergency Contact Information  Primary Emergency Contact: Radha Osullivan  Home Phone: [phone]  Work Phone: [phone]  Relation: Power of   Secondary Emergency Contact: Jade Osullivan  Mobile Phone: [phone]  Relation: Relative       Juan Abdullahi MD, MSc  Internal Medicine PGY-1    7/30/2024

## 2024-07-30 NOTE — PROCEDURES
"Speech-Language Pathology  Adult Inpatient Modified Barium Swallow Study (MBSS)    Patient Name: Caryl Tucker  MRN: [id number]  Today's Date: 7/30/2024   Start Time: 1315  Stop Time: 1345  Time Calculation (min): 30    Initial MBSS: Pt reports undergoing MBSS x2 in the past w/ recommendations of pureed solids & \"thicker liquids\" but unable to provide further detail + SLP unable to locate reports in EMR    Respiratory Status:  Room air, NAD    History of Present Illness:   Caryl Tucker is a 53 y.o. female with a past medical hx of CVA c/b left hemiparesis and dysphagia (2016) s/p PEG placement (removed unknown date), HTN, PE/DVT on Eliquis. Likely chronic respiratory acidosis. She was admitted to the MICU with HHS and Type II respiratory failure. Nephrology consulted for hypernatremia.     Impression:   Modified Barium Swallow Study completed. Verbal consent obtained. Pt awake/alert and conversant/cooperative throughout study.    Severe oropharyngeal dysphagia. Trials of thin liquids via tsp/straw, mildly (nectar) thick liquids via straw, honey (moderately) thick liquids via straw, and purees were given. Lingual pumping, reduced bolus control, and piecemeal swallow across all consistencies w/ poor oral clearance.     Posterior loss to the level of the pyriforms, delayed pharyngeal swallow initiation, and delayed epiglottic inversion/laryngeal vestibular closure resulted in moderate amount aspiration of single sips thin, mildly (nectar), and moderately (honey) thick liquids via straw. Pt inconsistently sensed aspiration (i.e., SILENT w/ mildly thick liquids) and attempted to clear w/ cough w/ minimal success. Trialed moderately (honey) thick liquids via chin tuck to aid in airway invasion w/ initial success; however, aspiration reduplicated after the swallow 2/2 residue from pyriforms spilling over the posterior laryngeal wall and below the level of the TVF. Improved timeliness of pharyngeal swallow w/ purees e/b no " penetration/aspiration w/ x7 trials.    Pt not appropriate for initiation of complete oral diet given aspiration of all liquid consistencies. Would recommend NPO w/ frequent aggressive oral care + question need for alternate means nutrition/hydration. 5-7 ice chips/hr allowed for pleasure, safe swallow stimulation, and after oral care to prevent buildup of bacteria within the oropharynx.    Of note, pt's home facility reports pt tolerating Pureed (Level 4) Solids & Pudding Thick Liquids at baseline/prior to admission; confirmed via MBSS that pt consistently demonstrates safety/efficiency w/ pureed consistency. However, modified diets/thickened liquids can encourage/place pt at risk of malnutrition/dehydration; would deem purees appropriate for pleasure and/or bolus-driven therapy only.     Provided extensive education to pt re: results of MBSS, recommendations, and options for dysphagia POC. Pt expressed significant disinterest in dobhoff or PEG tube replacement + is content w/ baseline diet of pureed solids & pudding thick liquids. Therefore, question need for GOC discussions re: assuming the risk of aspiration/malnutrition/dehydration w/ complete oral diet vs pleasure feeds w/ supplemental nutrition. MD aware.       [place]:  Thin: 7 - Material enters airway, passes below the folds, not ejected despite effort.   Nectar: 8 - Material enters airway, passes below the folds, no effort to eject (no cough).  Honey: 8 - Material enters airway, passes below the folds, no effort to eject (no cough).  Puree: 1 - Material does not enter airway.     Recommendations:  NPO with frequent aggressive oral care + purees for pleasure and/or bolus-driven therapy only  5-7 ice chips/hr allowed for pleasure, safe swallow stimulation, and after oral care to prevent buildup of bacteria within the oropharynx  Consider alternate means nutrition/hydration    vs GOC discussions re: assuming the risk of aspiration/malnutrition/dehydration  w/ complete oral diet given pt's tolerance/content w/ baseline diet of Pureed (Level 4) Solids & Pudding Thick Liquids    Goal:   Pt will tolerate least restrictive diet with no overt clinical s/s aspiration 100% of the time.   Start Date: 7/30/24  Expected Time Frame to Meet Goal: 2-3 weeks       Plan:  SLP Services Indicated: Yes  Frequency: pending GOC  Discussed POC with patient  SLP - OK to Discharge    Pain:   0-10  0 = No pain.     Inpatient Education:  Extensive education provided to patient regarding current swallow function, recommendations/results, and POC.      Consultations/Referrals/Coordination of Services:   GOC

## 2024-07-30 NOTE — PROGRESS NOTES
Subjective   Caryl Tucker is a 53 y.o. female on hospital day 9 reports no significant events.  Patient removed her Dobbhoff from her nose last night    Objective     Exam     Vitals:    07/30/24 0000 07/30/24 0400 07/30/24 0747 07/30/24 1300   BP: 146/76 129/79 135/70 130/81   BP Location:  Right arm  Right arm   Patient Position:  Lying  Lying   Pulse: 88 88 86 89   Resp: 16 17 20 20   Temp: 35.9 °C (96.6 °F) 36.6 °C (97.9 °F) 35.9 °C (96.6 °F) 36.4 °C (97.6 °F)   TempSrc:  Temporal  Temporal   SpO2: 97% 97% 96% 96%   Weight:       Height:          Intake/Output last 3 shifts:  I/O last 3 completed shifts:  In: 50 (0.5 mL/kg) [I.V.:50 (0.5 mL/kg)]  Out: 2750 (26.7 mL/kg) [Urine:2250 (0.6 mL/kg/hr); Emesis/NG output:500]  Weight: 103 kg     Physical Exam  Vitals reviewed.   Constitutional:       Appearance: She is obese. She is not ill-appearing or diaphoretic.   HENT:      Head: Normocephalic and atraumatic.      Nose:      Comments: Dobbhoff is gone  Cardiovascular:      Rate and Rhythm: Normal rate and regular rhythm.      Pulses: Normal pulses.      Heart sounds: No murmur heard.     No friction rub. No gallop.   Pulmonary:      Effort: Pulmonary effort is normal.      Breath sounds: No wheezing, rhonchi or rales.      Comments: Anteriorly clear  Abdominal:      General: Bowel sounds are normal. There is no distension.      Palpations: Abdomen is soft.      Tenderness: There is no abdominal tenderness. There is no guarding or rebound.   Musculoskeletal:      Comments: 1+ bilateral lower extremity edema   Skin:     General: Skin is warm and dry.   Neurological:      Mental Status: She is alert.      Comments: Left-sided weakness which is chronic   Psychiatric:         Mood and Affect: Mood normal.         Behavior: Behavior normal.      Comments: Remains with odd affect            Medications   aspirin, 81 mg, oral, Daily  atorvastatin, 40 mg, oral, Nightly  insulin glargine, 8 Units, subcutaneous,  Nightly  insulin lispro, 0-10 Units, subcutaneous, q6h  [Held by provider] insulin lispro, 5 Units, subcutaneous, TID  metoprolol, 5 mg, intravenous, Once  nitroglycerin, 0.8 mg, sublingual, Once  polyethylene glycol, 17 g, oral, Daily  senna, 5 mL, oral, Nightly       PRN medications: dextrose, glucagon, glucagon, heparin, LORazepam, metoprolol, metoprolol, metoprolol, metoprolol, metoprolol tartrate, ondansetron       Labs     All new labs reviewed:  some of the basic labs as follows -     Results from last 7 days   Lab Units 07/30/24  0829 07/29/24  0746 07/29/24  0105 07/28/24  1051 07/27/24  1041 07/27/24  0536 07/26/24  1219   WBC AUTO x10*3/uL 11.4* 11.7* 12.6* 11.0 7.7   < > 9.0   HEMOGLOBIN g/dL 10.2* 10.4* 9.5* 9.9* 9.8*   < > 10.2*   HEMATOCRIT % 33.7* 34.6* 31.4* 32.7* 31.1*   < > 34.5*   PLATELETS AUTO x10*3/uL 232 197 185 161 102*   < > 90*   NEUTROS PCT AUTO %  --   --   --   --  58.9  --  63.6   LYMPHO PCT MAN % 15.5 20.0  --  16.0  --   --   --    LYMPHS PCT AUTO %  --   --   --   --  21.4  --  18.9   MONO PCT MAN % 7.7 4.4  --  5.0  --   --   --    MONOS PCT AUTO %  --   --   --   --  13.6  --  13.3   EOSINO PCT MAN % 2.6 1.7  --  1.0  --   --   --    EOS PCT AUTO %  --   --   --   --  3.8  --  2.9    < > = values in this interval not displayed.      Results from last 72 hours   Lab Units 07/29/24  0124   INR  1.0   APTT seconds 80*     Results from last 72 hours   Lab Units 07/30/24  0828 07/29/24  0746 07/28/24  1051   SODIUM mmol/L 139 140 138   POTASSIUM mmol/L 3.5 3.4* 3.7   CHLORIDE mmol/L 100 101 100   CO2 mmol/L 28 32 30   BUN mg/dL 5* 7 10   CREATININE mg/dL 0.78 0.81 0.95     Results from last 72 hours   Lab Units 07/30/24  0828 07/29/24  0746 07/28/24  1051   ALK PHOS U/L 103 103 104   AST U/L 84* 79* 100*   ALT U/L 53* 55* 62*   BILIRUBIN TOTAL mg/dL 0.5 0.5 0.4   BILIRUBIN DIRECT mg/dL 0.2 0.3 0.2   ALBUMIN g/dL 2.9* 2.9* 2.9*   PROTEIN TOTAL g/dL 6.4 6.5 6.4     Results from last 72  "hours   Lab Units 07/30/24  0828 07/29/24  0746 07/28/24  1051   GLUCOSE mg/dL 154* 158* 203*           No results found for: \"TR1\"  Lab Results   Component Value Date    URINECULTURE No growth 07/25/2024    BLOODCULT No growth at 4 days -  FINAL REPORT 07/25/2024    BLOODCULT No growth at 4 days -  FINAL REPORT 07/25/2024    BLOODCULT No growth at 4 days -  FINAL REPORT 07/21/2024    BLOODCULT Staphylococcus cohnii (AA) 07/21/2024            Imaging   XR chest 1 view  Narrative: Interpreted By:  Vamsi Allred,  and Darshan Back   STUDY:  XR CHEST 1 VIEW;  7/25/2024 5:55 pm      INDICATION:  Signs/Symptoms:fever workup.      COMPARISON:  Chest x-ray 07/22/2024; chest, abdomen pelvis CT scan 07/21/2024      ACCESSION NUMBER(S):  [id number]      ORDERING CLINICIAN:  SHAMAR TRISTAN      FINDINGS:  AP radiograph of the chest patient is now slightly rotated towards  right.      LINES:  Interval extubation as well as removal of right IJ approach central  venous catheter and enteric tube. Dobbhoff tube is now visualized  coursing below the level of diaphragm and tip out of the field of  view.      CARDIOMEDIASTINAL SILHOUETTE:  The cardiomediastinal silhouette is grossly stable in size and  configuration.      LUNGS:  Low lung volumes with bronchovascular crowding. Improved but residual  bandlike bibasilar atelectasis, left more than right. There is no new  focal consolidation, pleural effusion or pneumothorax.      ABDOMEN:  No remarkable upper abdominal findings.      BONES:  No acute osseous abnormality.      Impression: 1. Improved but residual bibasilar atelectasis, left more than right.  No new airspace consolidation.  2. Medical devices as above.      I personally reviewed the images/study and I agree with the findings  as stated by Wong Sexton DO, PGY-3. This study was interpreted  at [facility], [place], [place].      Signed by: Vamsi Allred 7/26/2024 7:27 " AM  Dictation workstation:   [id number]     No results found for this or any previous visit from the past 1095 days.     Encounter Date: 07/21/24   Electrocardiogram, 12-lead PRN ACS symptoms   Result Value    Ventricular Rate 88    Atrial Rate 88    PA Interval 158    QRS Duration 118    QT Interval 378    QTC Calculation(Bazett) 457    P Axis 71    R Axis 20    T Axis 55    QRS Count 14    Q Onset 229    P Onset 150    P Offset 211    T Offset 418    QTC Fredericia 429    Narrative    Normal sinus rhythm  Nonspecific intraventricular conduction delay  Nonspecific ST and T wave abnormality  Abnormal ECG  When compared with ECG of 22-JUL-2024 00:22,  Sinus rhythm has replaced Junctional rhythm  ST no longer depressed in Inferior leads  Non-specific change in ST segment in Anterior leads  ST less depressed in Lateral leads  Confirmed by Aris Davila ([id number]) on 7/23/2024 5:22:40 PM          Assessment and Plan     Newly diagnosed diabetes mellitus in the setting of HHS: Blood glucose overall doing well although patient was set to be n.p.o. for left heart catheterization.  -Continue Lantus and lispro with meals and continue to titrate based on corrective sliding scale  -Continue corrective sliding scale insulin     V. tach arrest: Had lengthy discussion with cardiology.  Need to determine patient's means for getting medications prior to doing heart catheterization as if she should have PCI stenting done will need her aspirin and Plavix.  And lieu of left heart catheterization cardiology recommending CT coronaries at this time.  -Follow-up cardiology recommendations.   -Checking CT coronaries.  Will give metoprolol per protocol.  Holding off on left heart catheterization at this time.  May need cardiac MRI to further assess etiology of cardiac arrest  -Will discuss with patient's POA possible ICD for secondary prevention given her cardiac arrest.  Make sure this is within the goals of care.  -Continue aspirin and  statin  -Continue heparin drip at this time  -Keep K greater than 4 and mag greater than 2     Transaminitis: Overall relatively mild and downtrending possibly related to V. tach arrest.  Overall improving  -Trend LFTs     Acute renal failure: Since resolved  -Monitor renal function panel     ID: COVID: Remains with ever so slight elevation in WBC.  No left shift.  No focal complaints.  All blood culture repeats are negative and final  -Monitor CBC with differential      GI:  -Continue bowel care     CNS:  -Physical therapy/Occupational Therapy  -Speech therapy evaluation to see if able to advance to a diet.  Patient have MBS today to further investigate.    DVT prophylaxis covered by heparin drip at this time     Dwain Landon MD      Of note the above was done with Dragon dictation system.  Note was proofread to minimize errors.

## 2024-07-30 NOTE — PROGRESS NOTES
"Subjective   Self removed DHT OVN  -150s  SR 80s  Plan for MBS  Denies CP/ SOB/dizziness, palpitations        Objective   Visit Vitals  /70   Pulse 86   Temp 35.9 °C (96.6 °F)   Resp 20   Ht 1.803 m (5' 11\")   Wt 103 kg (227 lb 1.2 oz)   SpO2 96%   BMI 31.67 kg/m²   Smoking Status Never   BSA 2.27 m²      Physical Exam  General: awake, alert, no acute distress  HEENT: no scleral icterus, room air   CV: RRR  Resp: CTA anteriorly   Extremities: No edema    Cardiographics  Transthoracic Echo (TTE) Limited 7/22:  1. The left ventricular systolic function is normal, with a visually estimated ejection fraction of 65-70%.  2. There is moderate left ventricular hypertrophy.  3. There is normal right ventricular global systolic function.  4. The pulmonary artery is not well visualized.  5. On call limited fellow echo.       Lab Review   Lab Results   Component Value Date     07/30/2024    K 3.5 07/30/2024     07/30/2024    CO2 28 07/30/2024    BUN 5 (L) 07/30/2024    CREATININE 0.78 07/30/2024    GLUCOSE 154 (H) 07/30/2024    CALCIUM 8.5 (L) 07/30/2024     Lab Results   Component Value Date    WBC 11.4 (H) 07/30/2024    HGB 10.2 (L) 07/30/2024    HCT 33.7 (L) 07/30/2024    MCV 89 07/30/2024     07/30/2024        Assessment/Plan   53F w/ CVA c/b left hemiparesis and dysphagia (2016) s/p PEG placement (2016, replaced 5 times, removed unknown date), HTN, PE/DVT on Eliquis who was sent to the ED by [facility] for evaluation of encephalopathy. She was found to have an ZULMA with hyperkalemia, hyperglycemia, glucose greater than 1400, and the CT concerning for proctitis, she was treated with antibiotics, started with insulin drip, required Levophed briefly, and was intubated for airway protection.     Around 11:52 PM, patient was noted to go into pulseless V-fib arrest, per telemetry review patient had increasing episodes of NSVT and bigeminy prior to this arrest. The patient required 3 " shocks. She received epi x 3, amiodarone 300mg, calcium x 1 , HCO3 x 1 during the code. She  intermittently had ROSC in between episodes.      Impression   #VF arrest   #Elevated troponin  #No parenteral access  -Patient had increasing episodes of NSVT and bigeminy prior to this arrest highly suggestive of electrolyte imbalance in the setting of HHS and K 3.2 Mg 1.96. It is likely that her true potassium level was lower (6.1 on presentation)   -Troponin peaked at 955, and formal TTE preserved EF with no concerning wall motion abnormalities   -Given this was less likely an ischemic event, though cannot rule out ischemic cause completely  -please have GOC discussion with patient/POA to determine if appropriate to proceed with ischemic eval/CMR/ICD placement   - If within GOC, once able to take meds via parenteral route (either passes MBS or tube re insterted) please order Coronary CTA for ischemic eval    -If ischemic eval negative, will plan CMR to further assess etiology  - will then subsequently plan EP involvement to consider ICD placement for secondary prevention if within GOC  - Keep K+>4 and Mg+ >2   - Continue Heparin bridge to Eliquis (hx of DVT/PE) until determined all procedures complete        /Cardiology will follow  Case discussed with MAK Suggs-CNP  Cardiology Consults    Please call with any questions  Pager [phone] M-F 7a-6p; Saturday 7a-2p  Pager [phone] all other times

## 2024-07-31 LAB
ALBUMIN SERPL BCP-MCNC: 3 G/DL (ref 3.4–5)
ALP SERPL-CCNC: 100 U/L (ref 33–110)
ALT SERPL W P-5'-P-CCNC: 44 U/L (ref 7–45)
ANION GAP SERPL CALC-SCNC: 16 MMOL/L (ref 10–20)
AST SERPL W P-5'-P-CCNC: 67 U/L (ref 9–39)
BASOPHILS # BLD MANUAL: 0 X10*3/UL (ref 0–0.1)
BASOPHILS NFR BLD MANUAL: 0 %
BILIRUB DIRECT SERPL-MCNC: 0.1 MG/DL (ref 0–0.3)
BILIRUB SERPL-MCNC: 0.5 MG/DL (ref 0–1.2)
BUN SERPL-MCNC: 4 MG/DL (ref 6–23)
CALCIUM SERPL-MCNC: 8.6 MG/DL (ref 8.6–10.6)
CHLORIDE SERPL-SCNC: 100 MMOL/L (ref 98–107)
CO2 SERPL-SCNC: 28 MMOL/L (ref 21–32)
CREAT SERPL-MCNC: 0.75 MG/DL (ref 0.5–1.05)
EGFRCR SERPLBLD CKD-EPI 2021: >90 ML/MIN/1.73M*2
EOSINOPHIL # BLD MANUAL: 0.28 X10*3/UL (ref 0–0.7)
EOSINOPHIL NFR BLD MANUAL: 2.6 %
ERYTHROCYTE [DISTWIDTH] IN BLOOD BY AUTOMATED COUNT: 19 % (ref 11.5–14.5)
GLUCOSE BLD MANUAL STRIP-MCNC: 101 MG/DL (ref 74–99)
GLUCOSE BLD MANUAL STRIP-MCNC: 129 MG/DL (ref 74–99)
GLUCOSE BLD MANUAL STRIP-MCNC: 152 MG/DL (ref 74–99)
GLUCOSE SERPL-MCNC: 127 MG/DL (ref 74–99)
HCT VFR BLD AUTO: 36.8 % (ref 36–46)
HGB BLD-MCNC: 11 G/DL (ref 12–16)
IMM GRANULOCYTES # BLD AUTO: 0.71 X10*3/UL (ref 0–0.7)
IMM GRANULOCYTES NFR BLD AUTO: 6.6 % (ref 0–0.9)
LYMPHOCYTES # BLD MANUAL: 1.89 X10*3/UL (ref 1.2–4.8)
LYMPHOCYTES NFR BLD MANUAL: 17.7 %
MAGNESIUM SERPL-MCNC: 1.74 MG/DL (ref 1.6–2.4)
MCH RBC QN AUTO: 27.4 PG (ref 26–34)
MCHC RBC AUTO-ENTMCNC: 29.9 G/DL (ref 32–36)
MCV RBC AUTO: 92 FL (ref 80–100)
MONOCYTES # BLD MANUAL: 0.66 X10*3/UL (ref 0.1–1)
MONOCYTES NFR BLD MANUAL: 6.2 %
MYELOCYTES # BLD MANUAL: 0.57 X10*3/UL
MYELOCYTES NFR BLD MANUAL: 5.3 %
NEUTROPHILS # BLD MANUAL: 7.01 X10*3/UL (ref 1.2–7.7)
NEUTS BAND # BLD MANUAL: 0.19 X10*3/UL (ref 0–0.7)
NEUTS BAND NFR BLD MANUAL: 1.8 %
NEUTS SEG # BLD MANUAL: 6.82 X10*3/UL (ref 1.2–7)
NEUTS SEG NFR BLD MANUAL: 63.7 %
NRBC BLD-RTO: 3.4 /100 WBCS (ref 0–0)
PHOSPHATE SERPL-MCNC: 3.7 MG/DL (ref 2.5–4.9)
PLASMA CELLS # BLD MANUAL: 0.1 X10*3/UL
PLASMA CELLS NFR BLD MANUAL: 0.9 %
PLATELET # BLD AUTO: 234 X10*3/UL (ref 150–450)
POTASSIUM SERPL-SCNC: 3.5 MMOL/L (ref 3.5–5.3)
PROT SERPL-MCNC: 6.9 G/DL (ref 6.4–8.2)
RBC # BLD AUTO: 4.01 X10*6/UL (ref 4–5.2)
RBC MORPH BLD: NORMAL
SODIUM SERPL-SCNC: 140 MMOL/L (ref 136–145)
TOTAL CELLS COUNTED BLD: 113
UFH PPP CHRO-ACNC: 0.5 IU/ML
VARIANT LYMPHS # BLD MANUAL: 0.19 X10*3/UL (ref 0–0.5)
VARIANT LYMPHS NFR BLD: 1.8 %
WBC # BLD AUTO: 10.7 X10*3/UL (ref 4.4–11.3)

## 2024-07-31 PROCEDURE — 82947 ASSAY GLUCOSE BLOOD QUANT: CPT

## 2024-07-31 PROCEDURE — 99232 SBSQ HOSP IP/OBS MODERATE 35: CPT | Performed by: INTERNAL MEDICINE

## 2024-07-31 PROCEDURE — 84100 ASSAY OF PHOSPHORUS: CPT

## 2024-07-31 PROCEDURE — 36415 COLL VENOUS BLD VENIPUNCTURE: CPT

## 2024-07-31 PROCEDURE — 1200000002 HC GENERAL ROOM WITH TELEMETRY DAILY

## 2024-07-31 PROCEDURE — 84075 ASSAY ALKALINE PHOSPHATASE: CPT

## 2024-07-31 PROCEDURE — 85520 HEPARIN ASSAY: CPT

## 2024-07-31 PROCEDURE — 85027 COMPLETE CBC AUTOMATED: CPT

## 2024-07-31 PROCEDURE — 83735 ASSAY OF MAGNESIUM: CPT

## 2024-07-31 PROCEDURE — 99233 SBSQ HOSP IP/OBS HIGH 50: CPT | Performed by: NURSE PRACTITIONER

## 2024-07-31 PROCEDURE — 2500000004 HC RX 250 GENERAL PHARMACY W/ HCPCS (ALT 636 FOR OP/ED)

## 2024-07-31 PROCEDURE — 82248 BILIRUBIN DIRECT: CPT

## 2024-07-31 PROCEDURE — 85007 BL SMEAR W/DIFF WBC COUNT: CPT

## 2024-07-31 ASSESSMENT — COGNITIVE AND FUNCTIONAL STATUS - GENERAL
PERSONAL GROOMING: TOTAL
TURNING FROM BACK TO SIDE WHILE IN FLAT BAD: TOTAL
MOVING FROM LYING ON BACK TO SITTING ON SIDE OF FLAT BED WITH BEDRAILS: TOTAL
MOBILITY SCORE: 6
DRESSING REGULAR UPPER BODY CLOTHING: TOTAL
WALKING IN HOSPITAL ROOM: TOTAL
EATING MEALS: A LITTLE
STANDING UP FROM CHAIR USING ARMS: TOTAL
TURNING FROM BACK TO SIDE WHILE IN FLAT BAD: TOTAL
TOILETING: TOTAL
STANDING UP FROM CHAIR USING ARMS: TOTAL
DRESSING REGULAR LOWER BODY CLOTHING: TOTAL
WALKING IN HOSPITAL ROOM: TOTAL
PERSONAL GROOMING: TOTAL
HELP NEEDED FOR BATHING: TOTAL
HELP NEEDED FOR BATHING: TOTAL
MOVING FROM LYING ON BACK TO SITTING ON SIDE OF FLAT BED WITH BEDRAILS: TOTAL
TOILETING: TOTAL
DAILY ACTIVITIY SCORE: 6
MOVING TO AND FROM BED TO CHAIR: TOTAL
CLIMB 3 TO 5 STEPS WITH RAILING: TOTAL
DAILY ACTIVITIY SCORE: 8
CLIMB 3 TO 5 STEPS WITH RAILING: TOTAL
MOVING TO AND FROM BED TO CHAIR: TOTAL
PERSONAL GROOMING: TOTAL
MOVING FROM LYING ON BACK TO SITTING ON SIDE OF FLAT BED WITH BEDRAILS: TOTAL
DRESSING REGULAR UPPER BODY CLOTHING: TOTAL
EATING MEALS: A LITTLE
DRESSING REGULAR UPPER BODY CLOTHING: TOTAL
DRESSING REGULAR LOWER BODY CLOTHING: TOTAL
DRESSING REGULAR LOWER BODY CLOTHING: TOTAL
EATING MEALS: TOTAL
WALKING IN HOSPITAL ROOM: TOTAL
DAILY ACTIVITIY SCORE: 8
STANDING UP FROM CHAIR USING ARMS: TOTAL
HELP NEEDED FOR BATHING: TOTAL
MOVING TO AND FROM BED TO CHAIR: TOTAL
MOBILITY SCORE: 6
TURNING FROM BACK TO SIDE WHILE IN FLAT BAD: TOTAL
TOILETING: TOTAL
CLIMB 3 TO 5 STEPS WITH RAILING: TOTAL
MOBILITY SCORE: 6

## 2024-07-31 ASSESSMENT — PAIN SCALES - GENERAL
PAINLEVEL_OUTOF10: 0 - NO PAIN

## 2024-07-31 ASSESSMENT — PAIN - FUNCTIONAL ASSESSMENT: PAIN_FUNCTIONAL_ASSESSMENT: 0-10

## 2024-07-31 NOTE — PROGRESS NOTES
"Subjective     SLP recommending NPO and patient expressed significant disinterest in dobhoff or PEG tube replacement   Currently no means to provide oral medications    -150s  Denies CP/ SOB/dizziness, palpitations        Objective   Visit Vitals  /74   Pulse 79   Temp 35.8 °C (96.4 °F)   Resp 18   Ht 1.803 m (5' 11\")   Wt 102 kg (224 lb 13.9 oz)   SpO2 96%   BMI 31.36 kg/m²   Smoking Status Never   BSA 2.26 m²      Physical Exam  General: awake, alert, no acute distress  HEENT: no scleral icterus, room air   CV: RRR  Resp: CTA anteriorly   Extremities: No edema    Cardiographics  Transthoracic Echo (TTE) Limited 7/22:  1. The left ventricular systolic function is normal, with a visually estimated ejection fraction of 65-70%.  2. There is moderate left ventricular hypertrophy.  3. There is normal right ventricular global systolic function.  4. The pulmonary artery is not well visualized.  5. On call limited fellow echo.       Lab Review   Lab Results   Component Value Date     07/31/2024    K 3.5 07/31/2024     07/31/2024    CO2 28 07/31/2024    BUN 4 (L) 07/31/2024    CREATININE 0.75 07/31/2024    GLUCOSE 127 (H) 07/31/2024    CALCIUM 8.6 07/31/2024     Lab Results   Component Value Date    WBC 10.7 07/31/2024    HGB 11.0 (L) 07/31/2024    HCT 36.8 07/31/2024    MCV 92 07/31/2024     07/31/2024        Assessment/Plan   53F w/ CVA c/b left hemiparesis and dysphagia (2016) s/p PEG placement (2016, replaced 5 times, removed unknown date), HTN, PE/DVT on Eliquis who was sent to the ED by [facility] for evaluation of encephalopathy. She was found to have an ZULMA with hyperkalemia, hyperglycemia, glucose greater than 1400, and the CT concerning for proctitis, she was treated with antibiotics, started with insulin drip, required Levophed briefly, and was intubated for airway protection.     Around 11:52 PM, patient was noted to go into pulseless V-fib arrest, per telemetry " review patient had increasing episodes of NSVT and bigeminy prior to this arrest. The patient required 3 shocks. She received epi x 3, amiodarone 300mg, calcium x 1 , HCO3 x 1 during the code. She  intermittently had ROSC in between episodes.      Impression   #VF arrest   #Elevated troponin  #No parenteral access  -Patient had increasing episodes of NSVT and bigeminy prior to this arrest highly suggestive of electrolyte imbalance in the setting of HHS and K 3.2 Mg 1.96. It is likely that her true potassium level was lower (6.1 on presentation)   -Troponin peaked at 955, and formal TTE preserved EF with no concerning wall motion abnormalities   -Given this was less likely an ischemic event, though cannot rule out ischemic cause completely  -please have C discussion with patient/POA to determine if appropriate to proceed with ischemic eval/CMR/ICD placement   - If within GOC, once able to take meds via parenteral route (either passes MBS or tube re insterted) please order Coronary CTA for ischemic eval    -If ischemic eval negative, will plan CMR to further assess etiology  - will then subsequently plan EP involvement to consider ICD placement for secondary prevention if within GOC  - Keep K+>4 and Mg+ >2   - Continue Heparin bridge to Eliquis (hx of DVT/PE) until determined all procedures complete        Cardiology will follow  Case discussed with MAK Suggs-CNP  Cardiology Consults    Please call with any questions  Pager [phone] M-F 7a-6p; Saturday 7a-2p  Pager [phone] all other times

## 2024-07-31 NOTE — PROGRESS NOTES
Subjective   Caryl Tucker is a 53 y.o. female on hospital day 10 reports no significant events.  Patient reports that she would not want to have another feeding tube placed in her nose or through her abdominal wall again.  Per report patient was having difficulty taking her meds and was choking on her medications while trying to swallow them last night.    Objective     Exam     Vitals:    07/31/24 0355 07/31/24 0600 07/31/24 0801 07/31/24 1205   BP: 143/75  139/74 157/79   BP Location:       Patient Position:       Pulse: 83  79 78   Resp:   18 18   Temp: 36.1 °C (97 °F)  35.8 °C (96.4 °F) 36 °C (96.8 °F)   TempSrc:       SpO2: 94%  96% 95%   Weight:  102 kg (224 lb 13.9 oz)     Height:          Intake/Output last 3 shifts:  I/O last 3 completed shifts:  In: 2380.3 (23.3 mL/kg) [I.V.:2380.3 (23.3 mL/kg)]  Out: 2000 (19.6 mL/kg) [Urine:2000 (0.5 mL/kg/hr)]  Weight: 102 kg     Physical Exam  Vitals reviewed.   Constitutional:       Appearance: She is obese. She is not ill-appearing or diaphoretic.   HENT:      Head: Normocephalic and atraumatic.   Cardiovascular:      Rate and Rhythm: Normal rate and regular rhythm.      Pulses: Normal pulses.      Heart sounds: No murmur heard.     No friction rub. No gallop.   Pulmonary:      Effort: Pulmonary effort is normal.      Breath sounds: No wheezing, rhonchi or rales.      Comments: Anteriorly clear  Abdominal:      General: Bowel sounds are normal. There is no distension.      Palpations: Abdomen is soft.      Tenderness: There is no abdominal tenderness. There is no guarding or rebound.   Musculoskeletal:      Comments: 1+ bilateral lower extremity edema   Skin:     General: Skin is warm and dry.   Neurological:      Mental Status: She is alert.      Comments: Left-sided weakness which is chronic   Psychiatric:         Mood and Affect: Mood normal.         Behavior: Behavior normal.      Comments: Remains with odd affect            Medications   aspirin, 81 mg, oral,  Daily  atorvastatin, 40 mg, oral, Nightly  insulin glargine, 8 Units, subcutaneous, Nightly  insulin lispro, 0-10 Units, subcutaneous, q6h  [Held by provider] insulin lispro, 5 Units, subcutaneous, TID  metoprolol, 5 mg, intravenous, Once  metoprolol tartrate, 100 mg, oral, Daily  nitroglycerin, 0.8 mg, sublingual, Once  polyethylene glycol, 17 g, oral, Daily  senna, 5 mL, oral, Nightly       PRN medications: dextrose, glucagon, glucagon, heparin, LORazepam, metoprolol, metoprolol, metoprolol, metoprolol, metoprolol tartrate, ondansetron       Labs     All new labs reviewed:  some of the basic labs as follows -     Results from last 7 days   Lab Units 07/31/24  0656 07/30/24  0829 07/29/24  0746 07/28/24  1051 07/27/24  1041 07/27/24  0536 07/26/24  1219   WBC AUTO x10*3/uL 10.7 11.4* 11.7*   < > 7.7   < > 9.0   HEMOGLOBIN g/dL 11.0* 10.2* 10.4*   < > 9.8*   < > 10.2*   HEMATOCRIT % 36.8 33.7* 34.6*   < > 31.1*   < > 34.5*   PLATELETS AUTO x10*3/uL 234 232 197   < > 102*   < > 90*   NEUTROS PCT AUTO %  --   --   --   --  58.9  --  63.6   LYMPHO PCT MAN % 17.7 15.5 20.0   < >  --   --   --    LYMPHS PCT AUTO %  --   --   --   --  21.4  --  18.9   MONO PCT MAN % 6.2 7.7 4.4   < >  --   --   --    MONOS PCT AUTO %  --   --   --   --  13.6  --  13.3   EOSINO PCT MAN % 2.6 2.6 1.7   < >  --   --   --    EOS PCT AUTO %  --   --   --   --  3.8  --  2.9    < > = values in this interval not displayed.      Results from last 72 hours   Lab Units 07/29/24  0124   INR  1.0   APTT seconds 80*     Results from last 72 hours   Lab Units 07/31/24  0655 07/30/24  0828 07/29/24  0746   SODIUM mmol/L 140 139 140   POTASSIUM mmol/L 3.5 3.5 3.4*   CHLORIDE mmol/L 100 100 101   CO2 mmol/L 28 28 32   BUN mg/dL 4* 5* 7   CREATININE mg/dL 0.75 0.78 0.81     Results from last 72 hours   Lab Units 07/31/24  0655 07/30/24  0828 07/29/24  0746   ALK PHOS U/L 100 103 103   AST U/L 67* 84* 79*   ALT U/L 44 53* 55*   BILIRUBIN TOTAL mg/dL 0.5 0.5  "0.5   BILIRUBIN DIRECT mg/dL 0.1 0.2 0.3   ALBUMIN g/dL 3.0* 2.9* 2.9*   PROTEIN TOTAL g/dL 6.9 6.4 6.5     Results from last 72 hours   Lab Units 07/31/24  0655 07/30/24  0828 07/29/24  0746   GLUCOSE mg/dL 127* 154* 158*           No results found for: \"TR1\"  Lab Results   Component Value Date    URINECULTURE No growth 07/25/2024    BLOODCULT No growth at 4 days -  FINAL REPORT 07/25/2024    BLOODCULT No growth at 4 days -  FINAL REPORT 07/25/2024    BLOODCULT No growth at 4 days -  FINAL REPORT 07/21/2024    BLOODCULT Staphylococcus charisi (AA) 07/21/2024            Imaging   FL modified barium swallow study  Narrative: Interpreted By:  Akhil Huitron,  and Johnie San   STUDY:  FL MODIFIED BARIUM SWALLOW STUDY; 7/30/2024 2:01 pm      INDICATION:  Signs/Symptoms:Evaluate swallow capacity for oral vs PEG tube.      COMPARISON:  None.      ACCESSION NUMBER(S):  [id number]      ORDERING CLINICIAN:  TIANNA BALDERRAMA      TECHNIQUE:  MBSS completed. Informed verbal consent obtained prior to completion  of exam. Trials of thin, nectar thick, honey thick, puree,  soft-solids, and regular solids given. Fluoro time: 4.3 minutes  SLP: Mena Jett  Phone/Pager: [phone]      SPEECH FINDINGS:  Speech-Language Pathology  Adult Inpatient Modified Barium Swallow Study (MBSS)      Patient Name: Caryl Tucker  MRN: [id number]  Today's Date: 7/30/2024  Start Time: 1315  Stop Time: 1345  Time Calculation (min): 30      Initial MBSS: Pt reports undergoing MBSS x2 in the past w/  recommendations of pureed solids & \"thicker liquids\" but unable to  provide further detail + SLP unable to locate reports in EMR      Respiratory Status:  Room air, NAD      History of Present Illness:  Caryl Tucker is a 53 y.o. female with a past medical hx of CVA c/b  left hemiparesis and dysphagia (2016) s/p PEG placement (removed  unknown date), HTN, PE/DVT on Eliquis. Likely chronic respiratory  acidosis. She was admitted to the MICU with HHS and " Type II  respiratory failure. Nephrology consulted for hypernatremia.      Impression: Impression:  Modified Barium Swallow Study completed. Verbal consent obtained. Pt  awake/alert and conversant/cooperative throughout study.      Severe oropharyngeal dysphagia. Trials of thin liquids via tsp/straw,  mildly (nectar) thick liquids via straw, honey (moderately) thick  liquids via straw, and purees were given. Lingual pumping, reduced  bolus control, and piecemeal swallow across all consistencies w/ poor  oral clearance.      Posterior loss to the level of the pyriforms, delayed pharyngeal  swallow initiation, and delayed epiglottic inversion/laryngeal  vestibular closure resulted in moderate amount aspiration of single  sips thin, mildly (nectar), and moderately (honey) thick liquids via  straw. Pt inconsistently sensed aspiration (i.e., SILENT w/ mildly  thick liquids) and attempted to clear w/ cough w/ minimal success.  Trialed moderately (honey) thick liquids via chin tuck to aid in  airway invasion w/ initial success; however, aspiration reduplicated  after the swallow 2/2 residue from pyriforms spilling over the  posterior laryngeal wall and below the level of the TVF. Improved  timeliness of pharyngeal swallow w/ purees e/b no  penetration/aspiration w/ x7 trials.      Pt not appropriate for initiation of complete oral diet given  aspiration of all liquid consistencies. Would recommend NPO w/  frequent aggressive oral care + question need for alternate means  nutrition/hydration. 5-7 ice chips/hr allowed for pleasure, safe  swallow stimulation, and after oral care to prevent buildup of  bacteria within the oropharynx.      Of note, pt's home facility reports pt tolerating Pureed (Level 4)  Solids & Pudding Thick Liquids at baseline/prior to admission;  confirmed via MBSS that pt consistently demonstrates  safety/efficiency w/ pureed consistency. However, modified  diets/thickened liquids can encourage/place pt  at risk of  malnutrition/dehydration; would deem purees appropriate for pleasure  and/or bolus-driven therapy only.      Provided extensive education to pt re: results of MBSS,  recommendations, and options for dysphagia POC. Pt expressed  significant disinterest in dobhoff or PEG tube replacement + is  content w/ baseline diet of pureed solids & pudding thick liquids.  Therefore, question need for GOC discussions re: assuming the risk of  aspiration/malnutrition/dehydration w/ complete oral diet vs pleasure  feeds w/ supplemental nutrition. MD aware.      [place]:  Thin: 7 - Material enters airway, passes below the folds, not ejected  despite effort. Nectar: 8 - Material enters airway, passes below the  folds, no effort to eject (no cough). Honey: 8 - Material enters  airway, passes below the folds, no effort to eject (no cough). Puree:  1 - Material does not enter airway.      Recommendations:  NPO with frequent aggressive oral care + purees for pleasure and/or  bolus-driven therapy only  5-7 ice chips/hr allowed for pleasure, safe swallow stimulation, and  after oral care to prevent buildup of bacteria within the oropharynx  Consider alternate means nutrition/hydration      vs GOC discussions re: assuming the risk of  aspiration/malnutrition/dehydration w/ complete oral diet given pt's  tolerance/content w/ baseline diet of Pureed (Level 4) Solids &  Pudding Thick Liquids      Goal:  Pt will tolerate least restrictive diet with no overt clinical s/s  aspiration 100% of the time. Start Date: 7/30/24  Expected Time Frame to Meet Goal: 2-3 weeks          Plan:  SLP Services Indicated: Yes  Frequency: pending GOC  Discussed POC with patient  SLP - OK to Discharge      Pain:  0-10  0 = No pain.      Inpatient Education:  Extensive education provided to patient regarding current swallow  function, recommendations/results, and POC.      Consultations/Referrals/Coordination of Services:  GOC      Speech Therapy section  of this report signed by Mena Jett on  7/30/2024 at 4:27 pm.      RADIOLOGY FINDINGS:  No evidence of degenerative osseous changes to the cervical vertebrae.      Radiology section of this report signed by Dr. Huitron  .      IMPRESSION:  1. Please refer to detailed swallow study evaluation by speech  pathologist.      2. No radiographic evidence of acute osseous abnormalities within  limits of the current examination. I personally reviewed the  images/study and I agree with the findings as stated by Fritz Mackenzie DO PGY-2. This study was interpreted at [place], [place].      MACRO:  None      Signed by: Akhil Huitron 7/30/2024 6:15 PM  Dictation workstation:   [id number]     No results found for this or any previous visit from the past 1095 days.     Encounter Date: 07/21/24   Electrocardiogram, 12-lead PRN ACS symptoms   Result Value    Ventricular Rate 88    Atrial Rate 88    MT Interval 158    QRS Duration 118    QT Interval 378    QTC Calculation(Bazett) 457    P Axis 71    R Axis 20    T Axis 55    QRS Count 14    Q Onset 229    P Onset 150    P Offset 211    T Offset 418    QTC Fredericia 429    Narrative    Normal sinus rhythm  Nonspecific intraventricular conduction delay  Nonspecific ST and T wave abnormality  Abnormal ECG  When compared with ECG of 22-JUL-2024 00:22,  Sinus rhythm has replaced Junctional rhythm  ST no longer depressed in Inferior leads  Non-specific change in ST segment in Anterior leads  ST less depressed in Lateral leads  Confirmed by Aris Davila ([id number]) on 7/23/2024 5:22:40 PM          Assessment and Plan     Newly diagnosed diabetes mellitus in the setting of HHS: Blood glucose doing well on the 8 units of Lantus  -Continue Lantus and continue to titrate based on corrective sliding scale  -Continue corrective sliding scale insulin     V. tach arrest: Had lengthy discussion with cardiology.  Need to determine patient's  means for getting medications prior to doing heart catheterization as if she should have PCI stenting done will need her aspirin and Plavix.  And lieu of left heart catheterization cardiology recommending CT coronaries at this time.  -Follow-up cardiology recommendations.   -Having goals of care discussion prior to pursuing left heart cath or CT coronaries.    -Continue aspirin and statin once we have safe oral means  -Keep K greater than 4 and mag greater than 2     Transaminitis: Nearly resolved  -NTD     ID: COVID: WBC is back to within normal limits with no significant left shift  -Monitor CBC with differential      GI:  -Continue bowel care  -Maintenance IV fluids     CNS:  -Physical therapy/Occupational Therapy  -Speech therapy    -Will discuss with patient's POA goals of care including items such as future nutritional means i.e. PEG tube, possible ICD for secondary prevention given her cardiac arrest, etc.     DVT prophylaxis     Dwain Landon MD      Of note the above was done with Dragon dictation system.  Note was proofread to minimize errors.

## 2024-07-31 NOTE — CONSULTS
Wound Care Consult     Visit Date: 7/31/2024      Patient Name: Caryl Tucker         MRN: [id number]           YOB: 1970     Reason for Consult: Skin tear RUE/MARSI      Wound Assessment:  Wound 07/31/24 Skin Tear Arm Right;Upper;Anterior (Active)   Wound Image   07/31/24 1917   Site Assessment Clean;Red 07/31/24 1917   Mariana-Wound Assessment Fragile 07/31/24 1917   Non-staged Wound Description Partial thickness 07/31/24 1917   Wound Length (cm) 2 cm 07/31/24 1917   Wound Width (cm) 2 cm 07/31/24 1917   Wound Surface Area (cm^2) 4 cm^2 07/31/24 1917   Drainage Description Serosanguineous 07/31/24 1917   Drainage Amount Scant 07/31/24 1917   Dressing Xeroform;Silicone border dressing 07/31/24 1917   Dressing Changed New 07/31/24 1917     Wound location: RUE   Recommendations: DAILY      Irrigate with normal saline or wound cleanser      Apply Xeroform dressing to open wound bed      Cover with Mepilex border dressing       Wound Team Plan:  Primary provider please review the wound care consult note and pending wound care order. If you agree with orders please file in EMR.      While inpatient, Secure chat with questions or if condition changes. For urgent communications please message the [facility] Wound Care Team through [employer].       Sherri Mehta RN  7/31/2024  7:18 PM

## 2024-08-01 LAB
ALBUMIN SERPL BCP-MCNC: 3.2 G/DL (ref 3.4–5)
ALP SERPL-CCNC: 95 U/L (ref 33–110)
ALT SERPL W P-5'-P-CCNC: 35 U/L (ref 7–45)
ANION GAP SERPL CALC-SCNC: 15 MMOL/L (ref 10–20)
AST SERPL W P-5'-P-CCNC: 54 U/L (ref 9–39)
BASOPHILS # BLD AUTO: 0.03 X10*3/UL (ref 0–0.1)
BASOPHILS NFR BLD AUTO: 0.3 %
BILIRUB DIRECT SERPL-MCNC: 0 MG/DL (ref 0–0.3)
BILIRUB SERPL-MCNC: 0.5 MG/DL (ref 0–1.2)
BUN SERPL-MCNC: 4 MG/DL (ref 6–23)
CALCIUM SERPL-MCNC: 8.4 MG/DL (ref 8.6–10.6)
CHLORIDE SERPL-SCNC: 100 MMOL/L (ref 98–107)
CO2 SERPL-SCNC: 25 MMOL/L (ref 21–32)
CREAT SERPL-MCNC: 0.73 MG/DL (ref 0.5–1.05)
EGFRCR SERPLBLD CKD-EPI 2021: >90 ML/MIN/1.73M*2
EOSINOPHIL # BLD AUTO: 0.17 X10*3/UL (ref 0–0.7)
EOSINOPHIL NFR BLD AUTO: 1.8 %
ERYTHROCYTE [DISTWIDTH] IN BLOOD BY AUTOMATED COUNT: 19.2 % (ref 11.5–14.5)
GLUCOSE BLD MANUAL STRIP-MCNC: 101 MG/DL (ref 74–99)
GLUCOSE BLD MANUAL STRIP-MCNC: 107 MG/DL (ref 74–99)
GLUCOSE BLD MANUAL STRIP-MCNC: 112 MG/DL (ref 74–99)
GLUCOSE BLD MANUAL STRIP-MCNC: 131 MG/DL (ref 74–99)
GLUCOSE SERPL-MCNC: 123 MG/DL (ref 74–99)
HCT VFR BLD AUTO: 37.8 % (ref 36–46)
HGB BLD-MCNC: 11 G/DL (ref 12–16)
HOLD SPECIMEN: NORMAL
IMM GRANULOCYTES # BLD AUTO: 0.23 X10*3/UL (ref 0–0.7)
IMM GRANULOCYTES NFR BLD AUTO: 2.4 % (ref 0–0.9)
LYMPHOCYTES # BLD AUTO: 2.19 X10*3/UL (ref 1.2–4.8)
LYMPHOCYTES NFR BLD AUTO: 23.2 %
MAGNESIUM SERPL-MCNC: 1.93 MG/DL (ref 1.6–2.4)
MCH RBC QN AUTO: 26.6 PG (ref 26–34)
MCHC RBC AUTO-ENTMCNC: 29.1 G/DL (ref 32–36)
MCV RBC AUTO: 92 FL (ref 80–100)
MONOCYTES # BLD AUTO: 0.81 X10*3/UL (ref 0.1–1)
MONOCYTES NFR BLD AUTO: 8.6 %
NEUTROPHILS # BLD AUTO: 6.03 X10*3/UL (ref 1.2–7.7)
NEUTROPHILS NFR BLD AUTO: 63.7 %
NRBC BLD-RTO: 0.8 /100 WBCS (ref 0–0)
PHOSPHATE SERPL-MCNC: 3.5 MG/DL (ref 2.5–4.9)
PLATELET # BLD AUTO: 260 X10*3/UL (ref 150–450)
POTASSIUM SERPL-SCNC: 4.5 MMOL/L (ref 3.5–5.3)
PROT SERPL-MCNC: 7.1 G/DL (ref 6.4–8.2)
RBC # BLD AUTO: 4.13 X10*6/UL (ref 4–5.2)
SODIUM SERPL-SCNC: 135 MMOL/L (ref 136–145)
UFH PPP CHRO-ACNC: 0.5 IU/ML
WBC # BLD AUTO: 9.5 X10*3/UL (ref 4.4–11.3)

## 2024-08-01 PROCEDURE — 83735 ASSAY OF MAGNESIUM: CPT

## 2024-08-01 PROCEDURE — 36415 COLL VENOUS BLD VENIPUNCTURE: CPT

## 2024-08-01 PROCEDURE — 2500000004 HC RX 250 GENERAL PHARMACY W/ HCPCS (ALT 636 FOR OP/ED)

## 2024-08-01 PROCEDURE — 1200000002 HC GENERAL ROOM WITH TELEMETRY DAILY

## 2024-08-01 PROCEDURE — 99232 SBSQ HOSP IP/OBS MODERATE 35: CPT

## 2024-08-01 PROCEDURE — 82248 BILIRUBIN DIRECT: CPT

## 2024-08-01 PROCEDURE — 85520 HEPARIN ASSAY: CPT

## 2024-08-01 PROCEDURE — 84100 ASSAY OF PHOSPHORUS: CPT

## 2024-08-01 PROCEDURE — 82947 ASSAY GLUCOSE BLOOD QUANT: CPT

## 2024-08-01 PROCEDURE — 80053 COMPREHEN METABOLIC PANEL: CPT

## 2024-08-01 PROCEDURE — 85025 COMPLETE CBC W/AUTO DIFF WBC: CPT

## 2024-08-01 ASSESSMENT — PAIN SCALES - GENERAL
PAINLEVEL_OUTOF10: 0 - NO PAIN
PAINLEVEL_OUTOF10: 0 - NO PAIN

## 2024-08-01 ASSESSMENT — PAIN - FUNCTIONAL ASSESSMENT: PAIN_FUNCTIONAL_ASSESSMENT: 0-10

## 2024-08-01 NOTE — PROGRESS NOTES
Transitional Care Coordination Progress Note:  Patient discussed during interdisciplinary rounds.  Team members present: LEA PFEIFFER  Plan per Medical/Surgical team: Pt/ family s/p GOC and would like to pursue PEG tube placement. Plan for PEG tube placement tomorrow with GI.  Payer: [employer], [employer]  Status: Inpatient  Discharge disposition: [facility]  Potential Barriers: none  ADOD: 8/6  Spoke with ANUP Radha [phone] who stated she had a meeting with [facility] yesterday and was assured they are under new management so they are making improvements with their overall qualify of care. Radha stated pt expressed she would like to return to [facility] as she's become accustomed to all of their staff and for this reason, she would like for pt to return to their facility. [facility] confirmed they can accept pt back whenever she is medically ready, no precert is required. Medical team and SW updated of above. Care coordinator will continue to follow for discharge planning needs.     Sugar Samuels RN  Transitional Care Coordinator/TCC  [phone]

## 2024-08-01 NOTE — CARE PLAN
The patient's goals for the shift include  no increase in oxygen req, bg above 70    The clinical goals for the shift include pt will remain free from SOB or increasing work of breathing during shift,      Problem: Skin  Goal: Participates in plan/prevention/treatment measures  Outcome: Progressing  Flowsheets (Taken 8/1/2024 1216)  Participates in plan/prevention/treatment measures:   Discuss with provider PT/OT consult   Elevate heels  Goal: Prevent/manage excess moisture  Outcome: Progressing  Flowsheets (Taken 8/1/2024 1216)  Prevent/manage excess moisture:   Monitor for/manage infection if present   Cleanse incontinence/protect with barrier cream  Goal: Prevent/minimize sheer/friction injuries  Outcome: Progressing  Flowsheets (Taken 8/1/2024 1216)  Prevent/minimize sheer/friction injuries:   Turn/reposition every 2 hours/use positioning/transfer devices   HOB 30 degrees or less  Goal: Promote/optimize nutrition  Outcome: Progressing  Flowsheets (Taken 8/1/2024 1216)  Promote/optimize nutrition:   Monitor/record intake including meals   Offer water/supplements/favorite foods  Goal: Promote skin healing  Outcome: Progressing  Flowsheets (Taken 8/1/2024 1216)  Promote skin healing:   Turn/reposition every 2 hours/use positioning/transfer devices   Rotate device position/do not position patient on device     Problem: Fall/Injury  Goal: Not fall by end of shift  Outcome: Progressing  Goal: Be free from injury by end of the shift  Outcome: Progressing  Goal: Verbalize understanding of personal risk factors for fall in the hospital  Outcome: Progressing  Goal: Verbalize understanding of risk factor reduction measures to prevent injury from fall in the home  Outcome: Progressing  Goal: Use assistive devices by end of the shift  Outcome: Progressing  Goal: Pace activities to prevent fatigue by end of the shift  Outcome: Progressing     Problem: Pain - Adult  Goal: Verbalizes/displays adequate comfort level or baseline  comfort level  Outcome: Progressing     Problem: Safety - Adult  Goal: Free from fall injury  Outcome: Progressing     Problem: Discharge Planning  Goal: Discharge to home or other facility with appropriate resources  Outcome: Progressing     Problem: Chronic Conditions and Co-morbidities  Goal: Patient's chronic conditions and co-morbidity symptoms are monitored and maintained or improved  Outcome: Progressing     Problem: Diabetes  Goal: Achieve decreasing blood glucose levels by end of shift  Outcome: Progressing  Goal: Increase stability of blood glucose readings by end of shift  Outcome: Progressing  Goal: Decrease in ketones present in urine by end of shift  Outcome: Progressing  Goal: Maintain electrolyte levels within acceptable range throughout shift  Outcome: Progressing  Goal: Maintain glucose levels >70mg/dl to <250mg/dl throughout shift  Outcome: Progressing  Goal: No changes in neurological exam by end of shift  Outcome: Progressing  Goal: Learn about and adhere to nutrition recommendations by end of shift  Outcome: Progressing  Goal: Vital signs within normal range for age by end of shift  Outcome: Progressing  Goal: Increase self care and/or family involovement by end of shift  Outcome: Progressing  Goal: Receive DSME education by end of shift  Outcome: Progressing

## 2024-08-01 NOTE — PROGRESS NOTES
Progress Note, 7/31/2024:    History of Present Illness  Caryl Tucker is a 53 y.o. female currently on day 10 of admission (after 4d 3h in ICU) for HHS and acute hypercapnic respiratory failure requiring intubation c/b Vfib cardiac arrest.    Interval History  Overnight: patient had a choking episode on administration of crushed PO meds in pureed food. Cardiac workup deferred.    Discussed with patient and her aunt (power of ), given dysphagia and NPO recommendation. Explained risks of aspiration with oral intake and importance of adequate nutrition access. Both patient and POA were agreeable to proceeding with PEG tube placement. Consulting with GI.    Subjective  Patient is clinically stable.  Complained of a blister on proximal RUE, wound care following.  Urethral catheter removal requested.    Objective    Vitals  Visit Vitals  /74   Pulse 80   Temp 36.5 °C (97.7 °F)   Resp 18        I/O    Intake/Output Summary (Last 24 hours) at 7/31/2024 2144  Last data filed at 7/31/2024 1720  Gross per 24 hour   Intake 313.6 ml   Output 1700 ml   Net -1386.4 ml        Physical Exam  Physical Exam    Labs  CBC:  Results from last 7 days   Lab Units 07/31/24  0656 07/30/24  0829 07/29/24  0746   WBC AUTO x10*3/uL 10.7 11.4* 11.7*   HEMOGLOBIN g/dL 11.0* 10.2* 10.4*   PLATELETS AUTO x10*3/uL 234 232 197     BMP:  Results from last 7 days   Lab Units 07/31/24  0655 07/30/24  0828 07/29/24  0746   SODIUM mmol/L 140 139 140   POTASSIUM mmol/L 3.5 3.5 3.4*   CHLORIDE mmol/L 100 100 101   CO2 mmol/L 28 28 32   BUN mg/dL 4* 5* 7   CREATININE mg/dL 0.75 0.78 0.81   GLUCOSE mg/dL 127* 154* 158*     LFT:  Results from last 7 days   Lab Units 07/31/24  0655 07/30/24  0828 07/29/24  0746   AST U/L 67* 84* 79*   ALT U/L 44 53* 55*   ALK PHOS U/L 100 103 103   BILIRUBIN TOTAL mg/dL 0.5 0.5 0.5   BILIRUBIN DIRECT mg/dL 0.1 0.2 0.3     Cardiac:      Coag:  Results from last 7 days   Lab Units 07/29/24  0124   PROTIME seconds  11.5   APTT seconds 80*   INR  1.0     ABG:     .  UA:   Results from last 7 days   Lab Units 07/25/24  1758   COLOR U  Yellow   PH U  5.5   SPEC GRAV UR  1.019   PROTEIN U mg/dL 30 (1+)*   BLOOD UR  1.0 (3+)*   NITRITE U  NEGATIVE   WBC UR /HPF 1-5       Last EKG  Encounter Date: 07/21/24   Electrocardiogram, 12-lead PRN ACS symptoms   Result Value    Ventricular Rate 88    Atrial Rate 88    NJ Interval 158    QRS Duration 118    QT Interval 378    QTC Calculation(Bazett) 457    P Axis 71    R Axis 20    T Axis 55    QRS Count 14    Q Onset 229    P Onset 150    P Offset 211    T Offset 418    QTC Fredericia 429    Narrative    Normal sinus rhythm  Nonspecific intraventricular conduction delay  Nonspecific ST and T wave abnormality  Abnormal ECG  When compared with ECG of 22-JUL-2024 00:22,  Sinus rhythm has replaced Junctional rhythm  ST no longer depressed in Inferior leads  Non-specific change in ST segment in Anterior leads  ST less depressed in Lateral leads  Confirmed by Aris Davila ([id number]) on 7/23/2024 5:22:40 PM        Imaging    FL modified barium swallow study    Result Date: 7/30/2024  Impression: Modified Barium Swallow Study completed. Verbal consent obtained. Pt awake/alert and conversant/cooperative throughout study.   Severe oropharyngeal dysphagia. Trials of thin liquids via tsp/straw, mildly (nectar) thick liquids via straw, honey (moderately) thick liquids via straw, and purees were given. Lingual pumping, reduced bolus control, and piecemeal swallow across all consistencies w/ poor oral clearance.   Posterior loss to the level of the pyriforms, delayed pharyngeal swallow initiation, and delayed epiglottic inversion/laryngeal vestibular closure resulted in moderate amount aspiration of single sips thin, mildly (nectar), and moderately (honey) thick liquids via straw. Pt inconsistently sensed aspiration (i.e., SILENT w/ mildly thick liquids) and attempted to clear w/ cough w/ minimal success.  Trialed moderately (honey) thick liquids via chin tuck to aid in airway invasion w/ initial success; however, aspiration reduplicated after the swallow 2/2 residue from pyriforms spilling over the posterior laryngeal wall and below the level of the TVF. Improved timeliness of pharyngeal swallow w/ purees e/b no penetration/aspiration w/ x7 trials.   Pt not appropriate for initiation of complete oral diet given aspiration of all liquid consistencies. Would recommend NPO w/ frequent aggressive oral care + question need for alternate means nutrition/hydration. 5-7 ice chips/hr allowed for pleasure, safe swallow stimulation, and after oral care to prevent buildup of bacteria within the oropharynx.   Of note, pt's home facility reports pt tolerating Pureed (Level 4) Solids & Pudding Thick Liquids at baseline/prior to admission; confirmed via MBSS that pt consistently demonstrates safety/efficiency w/ pureed consistency. However, modified diets/thickened liquids can encourage/place pt at risk of malnutrition/dehydration; would deem purees appropriate for pleasure and/or bolus-driven therapy only.   Provided extensive education to pt re: results of MBSS, recommendations, and options for dysphagia POC. Pt expressed significant disinterest in dobhoff or PEG tube replacement + is content w/ baseline diet of pureed solids & pudding thick liquids. Therefore, question need for GOC discussions re: assuming the risk of aspiration/malnutrition/dehydration w/ complete oral diet vs pleasure feeds w/ supplemental nutrition. MD aware.   Ro: Thin: 7 - Material enters airway, passes below the folds, not ejected despite effort. Nectar: 8 - Material enters airway, passes below the folds, no effort to eject (no cough). Honey: 8 - Material enters airway, passes below the folds, no effort to eject (no cough). Puree: 1 - Material does not enter airway.   Recommendations: NPO with frequent aggressive oral care + purees for pleasure  and/or bolus-driven therapy only 5-7 ice chips/hr allowed for pleasure, safe swallow stimulation, and after oral care to prevent buildup of bacteria within the oropharynx Consider alternate means nutrition/hydration   vs GOC discussions re: assuming the risk of aspiration/malnutrition/dehydration w/ complete oral diet given pt's tolerance/content w/ baseline diet of Pureed (Level 4) Solids & Pudding Thick Liquids   Goal: Pt will tolerate least restrictive diet with no overt clinical s/s aspiration 100% of the time. Start Date: 7/30/24 Expected Time Frame to Meet Goal: 2-3 weeks     Plan: SLP Services Indicated: Yes Frequency: pending GOC Discussed POC with patient SLP - OK to Discharge   Pain: 0-10 0 = No pain.   Inpatient Education: Extensive education provided to patient regarding current swallow function, recommendations/results, and POC.   Consultations/Referrals/Coordination of Services: [facility]   Speech Therapy section of this report signed by Mena Jett on 7/30/2024 at 4:27 pm.   RADIOLOGY FINDINGS: No evidence of degenerative osseous changes to the cervical vertebrae.   Radiology section of this report signed by Dr. Huitron  .   IMPRESSION: 1. Please refer to detailed swallow study evaluation by speech pathologist.   2. No radiographic evidence of acute osseous abnormalities within limits of the current examination. I personally reviewed the images/study and I agree with the findings as stated by Fritz Mackenzie DO PGY-2. This study was interpreted at [facility], [place], [place].   MACRO: None   Signed by: Akhil Huitron 7/30/2024 6:15 PM Dictation workstation:   [id number]    XR chest 1 view    Result Date: 7/26/2024  1. Improved but residual bibasilar atelectasis, left more than right. No new airspace consolidation. 2. Medical devices as above.   I personally reviewed the images/study and I agree with the findings as stated by Wong Sexton DO, PGY-3. This study  was interpreted at [place], [place].   Signed by: Vamsi Allred 7/26/2024 7:27 AM Dictation workstation:   [id number]    XR abdomen 1 view    Result Date: 7/25/2024  1.  Nonobstructive bowel-gas pattern. 2. Devices as above. Enteric tube tip projects over the gastric body.   MACRO: None   Signed by: Gerardo Soler 7/25/2024 2:26 PM Dictation workstation:   [id number]    XR abdomen 1 view    Result Date: 7/24/2024  1. Enteric tube with distal tip overlying the expected location of the middle gastric body. 2. Large rectal stool burden.   I personally reviewed the images/study and I agree with the findings as stated by Wong Sexton DO, PGY-3. This study was interpreted at [facility], [place], [place].   MACRO: None   Signed by: Tyson Vega 7/24/2024 8:16 AM Dictation workstation:   [id number]    CT head wo IV contrast    Result Date: 7/22/2024  The examination is mildly degraded by patient motion artifact   No acute intracranial hemorrhage or mass effect.   Mild degree of nonspecific white matter hypodensity compatible with microangiopathy.   MACRO: None   Signed by: Candis Burns 7/22/2024 7:03 AM Dictation workstation:   [id number]    XR chest 1 view    Result Date: 7/22/2024  1. Interval placement of a right IJ central venous catheter with the tip projecting over lower SVC. 2. Unchanged consolidation in left lung base. Correlate with concern for infection. 3. Small left basilar pleural effusion, also stable.       Signed by: Maria Del Carmen Cardenas 7/22/2024 6:51 AM Dictation workstation:   [id number]    XR abdomen 1 view    Result Date: 7/22/2024  1.  Nonobstructive bowel gas pattern with enteric tube as described above. 2. Left lung base consolidation.   Signed by: Maria Del Carmen Cardenas 7/22/2024 6:47 AM Dictation workstation:   [id number]    XR chest 1 view    Result Date: 7/21/2024  1. OG tube tip just below the GE junction with the  side hole over the distal esophagus. Advancement is recommended 2. ETT in satisfactory position 3. Bilateral patchy airspace disease at the bases worse on the left. Small left effusion as well. Pneumonia in the differential.       MACRO: None   Signed by: Medhat Espinoza 7/21/2024 5:12 PM Dictation workstation:   [id number]    XR abdomen 1 view    Result Date: 7/21/2024  1. OG tube tip just below the GE junction with the side hole over the distal esophagus. Advancement is recommended.   MACRO: None   Signed by: Medhat Espinoza 7/21/2024 5:11 PM Dictation workstation:   [id number]    CT chest abdomen pelvis wo IV contrast    Result Date: 7/21/2024  Chest 1.  No acute process in the chest. 2. Dilated main pulmonary artery. Correlate with pulmonary arterial hypertension.   Abdomen-Pelvis 1.  Markedly distended rectum with mild thickening of the lower rectum and anorectal junction with a large stool ball and mesorectal fat stranding. Findings likely represent fecal impaction with stercoral proctitis. 2. Otherwise no acute process in the abdomen and pelvis.     MACRO: None   Signed by: Akhil Huitron 7/21/2024 2:41 PM Dictation workstation:   [id number]    CT head wo IV contrast    Result Date: 7/21/2024  Minimal cortical volume loss without hydrocephalus, hemorrhage or extra-axial collection.   MACRO: none   Signed by: Dick Darling 7/21/2024 2:09 PM Dictation workstation:   [id number]    XR chest 1 view    Result Date: 7/21/2024  1.  Left basilar dense airspace disease concerning for pneumonia versus atelectasis 2. Low lung volumes       MACRO: None   Signed by: Medhat Espinoza 7/21/2024 1:30 PM Dictation workstation:   [id number]       Inpatient Medications    Scheduled medications  aspirin, 81 mg, oral, Daily  atorvastatin, 40 mg, oral, Nightly  insulin glargine, 8 Units, subcutaneous, Nightly  insulin lispro, 0-10 Units, subcutaneous, q6h  [Held by provider] insulin lispro, 5 Units, subcutaneous,  TID  metoprolol, 5 mg, intravenous, Once  metoprolol tartrate, 100 mg, oral, Daily  nitroglycerin, 0.8 mg, sublingual, Once  polyethylene glycol, 17 g, oral, Daily  senna, 5 mL, oral, Nightly      Continuous medications  heparin, 0-4,000 Units/hr, Last Rate: 1,200 Units/hr (07/31/24 1157)      PRN medications  PRN medications: dextrose, glucagon, glucagon, heparin, LORazepam, metoprolol, metoprolol, metoprolol, metoprolol, metoprolol tartrate, ondansetron      Assessment & Plan  Caryl Tucker is a 53 y.o. female on day 10 of admission, PMH of CVA c/b left hemiparesis and dysphagia (2016) s/p PEG placement (2016, replaced 5 times, removed unknown date), HTN, PE/DVT on Eliquis, who was admitted to the MICU on 07/21/24 for HHS and acute hypercapnic respiratory failure requiring intubation. Vfib arrest occurred 7/22- ROSC achieved. Patient currently on the floor and clinically stable.  Notably, patient has not been tolerating PO intake even in pureed foods, SLT recommended NPO with alternate means nutrition.  Discussed with patient and POA (aunt), decision made to proceed with PEG tube placement.    Principal Problem:  #Dysphagia  #History of Stroke  #PEG tube   - Patient was seen by SLT, recommendation made for NPO w/ alternate means nutrition/hydration + purees/pudding for pleasure and/or bolus-driven therapy.  - Patient and power-of- agreeable with PEG tube placement.  Plan:  Follow-up with GI to proceed with PEG tube placement.    #Vfib #Cardiac arrest   #Elevated troponin  - Etiology of arrest likely electrolyte disturbances in the setting of HHS, although cannot rule out ischemic cause completely  - Cardiac workup deferred given no means to provide PO medication  Plan:  Consider workup after PEG tube placement  Follow-up with patient and POA for GOC from a cardiac standpoint (LHC/ICD)  Monitor K (goal >4) and Mg (goal >2)  C/w Heparin bridge       #Transaminitis (Resolved)  - AST and ALT  downtrending  Monitor LFTs     #ZULMA (Resolved)  - Resolved  Monitor RFPs     #COVID19 (Resolved)  Patient off precautions  Monitor CBC w/ differential     #Newly diagnosed diabetes mellitus   #HHS  C/w Lantus 8mg + SSI    Fluids: PRN  Electrolytes: PRN  Nutrition: NPO Diet; Effective now  Access: PRN  DVT prophylaxis: Heparin subq    Code Status: Full Code (Confirmed on admission)   Emergency Contact / NOK: Extended Emergency Contact Information  Primary Emergency Contact: Radha Osullivan  Home Phone: [phone]  Work Phone: [phone]  Relation: Power of   Secondary Emergency Contact: Jade Osullivan  Mobile Phone: [phone]  Relation: Relative       Juan Abdullahi MD, MSc  Internal Medicine PGY-1    7/31/2024

## 2024-08-01 NOTE — PROGRESS NOTES
"Nutrition Follow Up Assessment:   Nutrition Assessment    Pt self removed dobhoff on 7/29. Has been NPO as been unable to pass a swallow eal, plan is for PEG placement tomorrow (8/2). Thus pt has not been receiving enteral feeds over the last 3-4 days.    Anthropometrics:  Height: 180.3 cm (5' 11\")   Weight: 104 kg (230 lb 6.1 oz)   BMI (Calculated): 32.15  IBW/kg (Dietitian Calculated): 78.2 kg  Percent of IBW: 129 %     Weight History:   Date/Time Weight   08/01/24 0600 104 kg   07/31/24 0600 102 kg   07/25/24 0600 103 kg   07/24/24 0400 109 kg   07/23/24 0400 104 kg   07/21/24 1708 101 kg   07/21/24 1046 93 kg     Weight Change %: wt gain noted since admission       Nutrition Focused Physical Exam Findings:    Edema:  Edema: +1 trace  Edema Location: generalized    Nutrition Significant Labs:  BG POCT trend:   Results from last 7 days   Lab Units 08/01/24  0822 08/01/24  0228 07/31/24  1721 07/31/24  1208 07/31/24  0804   POCT GLUCOSE mg/dL 131* 107* 101* 129* 152*    , Renal Lab Trend:   Results from last 7 days   Lab Units 08/01/24  0854 07/31/24  0655 07/30/24  0828 07/29/24  0746   POTASSIUM mmol/L 4.5 3.5 3.5 3.4*   PHOSPHORUS mg/dL 3.5 3.7 3.3 3.0   SODIUM mmol/L 135* 140 139 140   MAGNESIUM mg/dL 1.93 1.74 1.75 1.61   EGFR mL/min/1.73m*2 >90 >90 90 86   BUN mg/dL 4* 4* 5* 7   CREATININE mg/dL 0.73 0.75 0.78 0.81        Nutrition Specific Medications:  Scheduled medications  insulin glargine, 8 Units, subcutaneous, Nightly  insulin lispro, 0-10 Units, subcutaneous, q6h  [Held by provider] insulin lispro, 5 Units, subcutaneous, TID  polyethylene glycol, 17 g, oral, Daily  senna, 5 mL, oral, Nightly    Continuous medications  heparin, 0-4,000 Units/hr, Last Rate: 1,200 Units/hr (08/01/24 0452)      I/O:   Last BM Date: 07/31/24; Stool Appearance: Soft (07/31/24 1720)    Dietary Orders (From admission, onward)       Start     Ordered    07/31/24 1451  NPO Diet; Effective now  Diet effective now         " 07/31/24 1450                     Estimated Needs:   Total Energy Estimated Needs (kCal):  (1259-3381)  Method for Estimating Needs: MSJ using 93 kgs = 1624 ;  ~23-25 kcals/kg of ideal BW  Total Protein Estimated Needs (g): 100 g  Method for Estimating Needs: ~1.3 gm/kg ideal BW  Total Fluid Estimated Needs (mL):  (per team)      Nutrition Diagnosis   Malnutrition Diagnosis  Patient has Malnutrition Diagnosis: No    Nutrition Diagnosis  Patient has Nutrition Diagnosis: Yes  Diagnosis Status (1): Ongoing  Nutrition Diagnosis 1: Increased nutrient needs  Related to (1): altered metabolism  As Evidenced by (1): critilcal illness, covid infection  Additional Nutrition Diagnosis: Diagnosis 2  Diagnosis Status (2): Ongoing  Nutrition Diagnosis 2: Swallowing difficulity  Related to (2): failed swallow eval with SLP 7/23  As Evidenced by (2): NPO status s/p extubation with NG placed       Nutrition Interventions/Recommendations         Nutrition Prescription:  Individualized Nutrition Prescription Provided for : enteral nutrition support        Nutrition Interventions:   Interventions: Enteral intake  Enteral Intake: Modify rate of enteral nutrition  Goal: Resume Glucerna 1.5 @ 50 ml/hr once PEG placed = 1800 kcal, 99 g protein, 911 ml free H20      Nutrition Monitoring and Evaluation   Food/Nutrient Related History Monitoring  Monitoring and Evaluation Plan: Enteral and parenteral nutrition intake  Enteral and Parenteral Nutrition Intake: Enteral nutrition formula/solution, Enteral nutrition intake  Criteria: resume enteral feeds, tolerate at goal of 50 ml/hr    Biochemical Data, Medical Tests and Procedures  Monitoring and Evaluation Plan: Electrolyte/renal panel, Glucose/endocrine profile  Criteria: lytes WNL  Criteria: POC glucose <180 mg/dl      Time Spent (min): 30 minutes

## 2024-08-01 NOTE — CARE PLAN
The patient's goals for the shift include  rest    The clinical goals for the shift include pt will remain free from SOB or increasing work of breathing during shift,      Problem: Skin  Goal: Prevent/manage excess moisture  Outcome: Progressing  Flowsheets (Taken 8/1/2024 0033)  Prevent/manage excess moisture:   Cleanse incontinence/protect with barrier cream   Moisturize dry skin  Goal: Prevent/minimize sheer/friction injuries  Outcome: Progressing  Flowsheets (Taken 8/1/2024 0033)  Prevent/minimize sheer/friction injuries:   Use pull sheet   HOB 30 degrees or less   Turn/reposition every 2 hours/use positioning/transfer devices     Problem: Fall/Injury  Goal: Not fall by end of shift  Outcome: Progressing  Goal: Be free from injury by end of the shift  Outcome: Progressing     Problem: Safety - Adult  Goal: Free from fall injury  Outcome: Progressing     Problem: Diabetes  Goal: Maintain glucose levels >70mg/dl to <250mg/dl throughout shift  Outcome: Progressing

## 2024-08-01 NOTE — PROGRESS NOTES
Progress Note, 8/1/2024:    History of Present Illness  Caryl Tucker is a 53 y.o. female currently on day 11 of admission (after 4d 3h in ICU) for HHS and acute hypercapnic respiratory failure requiring intubation c/b Vfib cardiac arrest.    Interval History  Both patient and POA were agreeable to proceeding with PEG tube placement. GI consulted, planning for PEG tube placement tomorrow.    Subjective  Patient is clinically stable.  No complaints today.    Objective    Vitals  Visit Vitals  /74   Pulse 83   Temp 37 °C (98.6 °F)   Resp 18        I/O    Intake/Output Summary (Last 24 hours) at 8/1/2024 1419  Last data filed at 8/1/2024 0900  Gross per 24 hour   Intake 236.8 ml   Output 900 ml   Net -663.2 ml        Physical Exam  Physical Exam    Labs  CBC:  Results from last 7 days   Lab Units 07/31/24  0656 07/30/24  0829 07/29/24  0746   WBC AUTO x10*3/uL 10.7 11.4* 11.7*   HEMOGLOBIN g/dL 11.0* 10.2* 10.4*   PLATELETS AUTO x10*3/uL 234 232 197     BMP:  Results from last 7 days   Lab Units 08/01/24  0854 07/31/24  0655 07/30/24  0828   SODIUM mmol/L 135* 140 139   POTASSIUM mmol/L 4.5 3.5 3.5   CHLORIDE mmol/L 100 100 100   CO2 mmol/L 25 28 28   BUN mg/dL 4* 4* 5*   CREATININE mg/dL 0.73 0.75 0.78   GLUCOSE mg/dL 123* 127* 154*     LFT:  Results from last 7 days   Lab Units 08/01/24  0854 07/31/24  0655 07/30/24  0828   AST U/L 54* 67* 84*   ALT U/L 35 44 53*   ALK PHOS U/L 95 100 103   BILIRUBIN TOTAL mg/dL 0.5 0.5 0.5   BILIRUBIN DIRECT mg/dL 0.0 0.1 0.2     Cardiac:      Coag:  Results from last 7 days   Lab Units 07/29/24  0124   PROTIME seconds 11.5   APTT seconds 80*   INR  1.0     ABG:     .  UA:   Results from last 7 days   Lab Units 07/25/24  1758   COLOR U  Yellow   PH U  5.5   SPEC GRAV UR  1.019   PROTEIN U mg/dL 30 (1+)*   BLOOD UR  1.0 (3+)*   NITRITE U  NEGATIVE   WBC UR /HPF 1-5       Last EKG  Encounter Date: 07/21/24   Electrocardiogram, 12-lead PRN ACS symptoms   Result Value     Ventricular Rate 88    Atrial Rate 88    SC Interval 158    QRS Duration 118    QT Interval 378    QTC Calculation(Bazett) 457    P Axis 71    R Axis 20    T Axis 55    QRS Count 14    Q Onset 229    P Onset 150    P Offset 211    T Offset 418    QTC Fredericia 429    Narrative    Normal sinus rhythm  Nonspecific intraventricular conduction delay  Nonspecific ST and T wave abnormality  Abnormal ECG  When compared with ECG of 22-JUL-2024 00:22,  Sinus rhythm has replaced Junctional rhythm  ST no longer depressed in Inferior leads  Non-specific change in ST segment in Anterior leads  ST less depressed in Lateral leads  Confirmed by Aris Davila ([id number]) on 7/23/2024 5:22:40 PM        Imaging    FL modified barium swallow study    Result Date: 7/30/2024  Impression: Modified Barium Swallow Study completed. Verbal consent obtained. Pt awake/alert and conversant/cooperative throughout study.   Severe oropharyngeal dysphagia. Trials of thin liquids via tsp/straw, mildly (nectar) thick liquids via straw, honey (moderately) thick liquids via straw, and purees were given. Lingual pumping, reduced bolus control, and piecemeal swallow across all consistencies w/ poor oral clearance.   Posterior loss to the level of the pyriforms, delayed pharyngeal swallow initiation, and delayed epiglottic inversion/laryngeal vestibular closure resulted in moderate amount aspiration of single sips thin, mildly (nectar), and moderately (honey) thick liquids via straw. Pt inconsistently sensed aspiration (i.e., SILENT w/ mildly thick liquids) and attempted to clear w/ cough w/ minimal success. Trialed moderately (honey) thick liquids via chin tuck to aid in airway invasion w/ initial success; however, aspiration reduplicated after the swallow 2/2 residue from pyriforms spilling over the posterior laryngeal wall and below the level of the TVF. Improved timeliness of pharyngeal swallow w/ purees e/b no penetration/aspiration w/ x7 trials.   Pt not  appropriate for initiation of complete oral diet given aspiration of all liquid consistencies. Would recommend NPO w/ frequent aggressive oral care + question need for alternate means nutrition/hydration. 5-7 ice chips/hr allowed for pleasure, safe swallow stimulation, and after oral care to prevent buildup of bacteria within the oropharynx.   Of note, pt's home facility reports pt tolerating Pureed (Level 4) Solids & Pudding Thick Liquids at baseline/prior to admission; confirmed via MBSS that pt consistently demonstrates safety/efficiency w/ pureed consistency. However, modified diets/thickened liquids can encourage/place pt at risk of malnutrition/dehydration; would deem purees appropriate for pleasure and/or bolus-driven therapy only.   Provided extensive education to pt re: results of MBSS, recommendations, and options for dysphagia POC. Pt expressed significant disinterest in dobhoff or PEG tube replacement + is content w/ baseline diet of pureed solids & pudding thick liquids. Therefore, question need for GOC discussions re: assuming the risk of aspiration/malnutrition/dehydration w/ complete oral diet vs pleasure feeds w/ supplemental nutrition. MD aware.   Rosenbeck: Thin: 7 - Material enters airway, passes below the folds, not ejected despite effort. Nectar: 8 - Material enters airway, passes below the folds, no effort to eject (no cough). Honey: 8 - Material enters airway, passes below the folds, no effort to eject (no cough). Puree: 1 - Material does not enter airway.   Recommendations: NPO with frequent aggressive oral care + purees for pleasure and/or bolus-driven therapy only 5-7 ice chips/hr allowed for pleasure, safe swallow stimulation, and after oral care to prevent buildup of bacteria within the oropharynx Consider alternate means nutrition/hydration   vs GOC discussions re: assuming the risk of aspiration/malnutrition/dehydration w/ complete oral diet given pt's tolerance/content w/ baseline  diet of Pureed (Level 4) Solids & Pudding Thick Liquids   Goal: Pt will tolerate least restrictive diet with no overt clinical s/s aspiration 100% of the time. Start Date: 7/30/24 Expected Time Frame to Meet Goal: 2-3 weeks     Plan: SLP Services Indicated: Yes Frequency: pending [facility] Discussed POC with patient SLP - OK to Discharge   Pain: 0-10 0 = No pain.   Inpatient Education: Extensive education provided to patient regarding current swallow function, recommendations/results, and POC.   Consultations/Referrals/Coordination of Services: [facility]   Speech Therapy section of this report signed by Mena Jett on 7/30/2024 at 4:27 pm.   RADIOLOGY FINDINGS: No evidence of degenerative osseous changes to the cervical vertebrae.   Radiology section of this report signed by Dr. Huitron  .   IMPRESSION: 1. Please refer to detailed swallow study evaluation by speech pathologist.   2. No radiographic evidence of acute osseous abnormalities within limits of the current examination. I personally reviewed the images/study and I agree with the findings as stated by Fritz Mackenzie DO PGY-2. This study was interpreted at [place], [place].   MACRO: None   Signed by: Akhil Huitron 7/30/2024 6:15 PM Dictation workstation:   [id number]    XR chest 1 view    Result Date: 7/26/2024  1. Improved but residual bibasilar atelectasis, left more than right. No new airspace consolidation. 2. Medical devices as above.   I personally reviewed the images/study and I agree with the findings as stated by Wong Sexton DO, PGY-3. This study was interpreted at [place], [place].   Signed by: Vamsi Allred 7/26/2024 7:27 AM Dictation workstation:   [id number]      Inpatient Medications    Scheduled medications  aspirin, 81 mg, oral, Daily  atorvastatin, 40 mg, oral, Nightly  insulin glargine, 8 Units, subcutaneous, Nightly  insulin lispro, 0-10 Units,  subcutaneous, q6h  [Held by provider] insulin lispro, 5 Units, subcutaneous, TID  metoprolol, 5 mg, intravenous, Once  nitroglycerin, 0.8 mg, sublingual, Once  polyethylene glycol, 17 g, oral, Daily  senna, 5 mL, oral, Nightly      Continuous medications  heparin, 0-4,000 Units/hr, Last Rate: 1,200 Units/hr (08/01/24 0452)      PRN medications  PRN medications: dextrose, glucagon, glucagon, heparin, LORazepam, metoprolol, metoprolol, metoprolol, metoprolol, metoprolol tartrate, ondansetron      Assessment & Plan  Caryl Tucker is a 53 y.o. female on day 11 of admission, PMH of CVA c/b left hemiparesis and dysphagia (2016) s/p PEG placement (2016, replaced 5 times, removed unknown date), HTN, PE/DVT on Eliquis, who was admitted to the MICU on 07/21/24 for HHS and acute hypercapnic respiratory failure requiring intubation. Vfib arrest occurred 7/22- ROSC achieved. Patient currently on the floor and clinically stable.  Notably, patient has not been tolerating PO intake even in pureed foods, SLT recommended NPO with alternate means nutrition.  Discussed with patient and POA (aunt), decision made to proceed with PEG tube placement.    Principal Problem:  #Dysphagia  #History of Stroke  #PEG tube   - Patient was seen by SLT, recommendation made for NPO w/ alternate means nutrition/hydration + purees/pudding for pleasure and/or bolus-driven therapy.  - Patient and power-of- agreeable with PEG tube placement.  Plan:  PEG placement tomorrow  Holding heparin at 4 am    #Vfib #Cardiac arrest   #Elevated troponin  - Etiology of arrest likely electrolyte disturbances in the setting of HHS, although cannot rule out ischemic cause completely  - Cardiac workup to be considered given plan for PEG tube placement.  Plan:  Follow-up with patient and POA for GOC from a cardiac standpoint  Monitor K (goal >4) and Mg (goal >2)  Holding heparin at 4 am for PEG tube placement      #Transaminitis (Resolved)  - AST and ALT  downtrending  Monitor LFTs     #ZULMA (Resolved)  - Resolved  Monitor RFPs     #COVID19 (Resolved)  Patient off precautions  Monitor CBC w/ differential     #Newly diagnosed diabetes mellitus   #HHS  C/w Lantus 8mg + SSI    Fluids: PRN  Electrolytes: PRN  Nutrition: NPO Diet; Effective now  Access: PRN  DVT prophylaxis: Heparin subq    Code Status: Full Code (Confirmed on admission)   Emergency Contact / NOK: Extended Emergency Contact Information  Primary Emergency Contact: Radha Osullivan  Home Phone: [phone]  Work Phone: [phone]  Relation: Power of   Secondary Emergency Contact: Jade Osullivan  Mobile Phone: [phone]  Relation: Relative       Juan Abdullahi MD, MSc  Internal Medicine PGY-1    8/1/2024

## 2024-08-01 NOTE — CONSULTS
[facility]   [facility]  INITIAL CONSULT NOTE     Source of Information: The source of the history was chart review     Consult requested by: Service: Medicine    Reason for Consult: PEG    Admission Chief Complaint: HSS      SUBJECTIVE     HPI: Caryl Tucker is a 53 y.o. female w/PMH of CVA with Lt hemiparesis and dysphagia (2016) s/p PEG, removed 3 years ago, PE/DVT on eliquis admitted to MICU 7/21/2024 for HHS and hypcapnic respiratory failure requiring intubation. Course c/b Vfib 7/22. Pt has not been able to tolerate PO intake. GI was consulted for PEG placement.     MBBS on 7/29 showed severe oropharyngeal dysphagia. Seen be SLP who recommended NPO vs pleasure feeding. Of note ongoing discussion with cardiology regarding Vfib work up which includes LHC/CT coronaries which likely require aspirin/plavix necessiate enteral access. After GOC with POA, decision was made for PEG re-placement. Not on AC.    WBC 10.7, Hb 11.0.      ROS: Complete review of systems obtained, negative unless otherwise indicated above.     Allergies   Allergen Reactions    Penicillins Anaphylaxis     Past Medical History:   Diagnosis Date    Anemia 2016    Chronic pulmonary edema (HHS-HCC) 2016    Dysarthria following cerebral infarction 2016    Dysphagia following cerebral infarction 2016    Dyspnea 2016    Encephalopathy 2016    Facial weakness following cerebral infarction 2016    Hemiplegia and hemiparesis following cerebral infarction affecting left non-dominant side (Multi) 2016    Hypertension 2016    Personal history of DVT (deep vein thrombosis) 2016    Personal history of pulmonary embolism 2016     Past Surgical History:   Procedure Laterality Date    GASTROSTOMY  08/31/2016    Originally placed 2016. Replaced 05/15/2018, 07/31/2018, 02/25/2021, 04/2021, 07/2021, 06/2022    MR HEAD ANGIO WO IV CONTRAST  08/19/2016    MR HEAD ANGIO WO IV CONTRAST 8/19/2016 [facility] CLINICAL  LEGACY    MR NECK ANGIO WO IV CONTRAST  08/19/2016    MR NECK ANGIO WO IV CONTRAST 8/19/2016 [facility] CLINICAL LEGACY    PEG TUBE REMOVAL      Unknown Date     No family history on file.  Social History     Social History Narrative    Not on file       Medications:  aspirin, 81 mg, oral, Daily  atorvastatin, 40 mg, oral, Nightly  insulin glargine, 8 Units, subcutaneous, Nightly  insulin lispro, 0-10 Units, subcutaneous, q6h  [Held by provider] insulin lispro, 5 Units, subcutaneous, TID  metoprolol, 5 mg, intravenous, Once  nitroglycerin, 0.8 mg, sublingual, Once  polyethylene glycol, 17 g, oral, Daily  senna, 5 mL, oral, Nightly      PRN medications: dextrose, glucagon, glucagon, heparin, LORazepam, metoprolol, metoprolol, metoprolol, metoprolol, metoprolol tartrate, ondansetron      EXAM     Vital signs:      7/31/2024     8:33 PM 8/1/2024    12:05 AM 8/1/2024     3:45 AM 8/1/2024     6:00 AM 8/1/2024     8:00 AM 8/1/2024    10:31 AM 8/1/2024     5:09 PM   Vitals   Systolic 116 135 138  139 158 134   Diastolic 74 69 77  81 74 79   Heart Rate 80    78 88 85  88 83 89   Temp 36.5 °C (97.7 °F) 36.5 °C (97.7 °F) 36.5 °C (97.7 °F)  37.3 °C (99.1 °F) 37 °C (98.6 °F) 35.8 °C (96.4 °F)   Resp 18 16 18  18 18 18   Weight (lb)    230.38      BMI    32.13 kg/m2      BSA (m2)    2.29 m2            Physical Exam   General: Alert   Eyes: EOMI, PERRLA  ENT: MMM  Heart: RRR  Lungs: No respiratory distress  Abdomen: Soft, non tender, non distended  Skin: No jaundice   Neuro: Alert, Lt side weakness      DATA                                                                            Labs     Results for orders placed or performed during the hospital encounter of 07/21/24 (from the past 24 hour(s))   POCT GLUCOSE   Result Value Ref Range    POCT Glucose 101 (H) 74 - 99 mg/dL   POCT GLUCOSE   Result Value Ref Range    POCT Glucose 107 (H) 74 - 99 mg/dL   POCT GLUCOSE   Result Value Ref Range    POCT Glucose 131 (H) 74 - 99 mg/dL    Comprehensive Metabolic Panel   Result Value Ref Range    Glucose 123 (H) 74 - 99 mg/dL    Sodium 135 (L) 136 - 145 mmol/L    Potassium 4.5 3.5 - 5.3 mmol/L    Chloride 100 98 - 107 mmol/L    Bicarbonate 25 21 - 32 mmol/L    Anion Gap 15 10 - 20 mmol/L    Urea Nitrogen 4 (L) 6 - 23 mg/dL    Creatinine 0.73 0.50 - 1.05 mg/dL    eGFR >90 >60 mL/min/1.73m*2    Calcium 8.4 (L) 8.6 - 10.6 mg/dL    Albumin 3.2 (L) 3.4 - 5.0 g/dL    Alkaline Phosphatase 95 33 - 110 U/L    Total Protein 7.1 6.4 - 8.2 g/dL    AST 54 (H) 9 - 39 U/L    Bilirubin, Total 0.5 0.0 - 1.2 mg/dL    ALT 35 7 - 45 U/L   Magnesium   Result Value Ref Range    Magnesium 1.93 1.60 - 2.40 mg/dL   Phosphorus   Result Value Ref Range    Phosphorus 3.5 2.5 - 4.9 mg/dL   Bilirubin, Direct   Result Value Ref Range    Bilirubin, Direct 0.0 0.0 - 0.3 mg/dL   Heparin Assay, UFH   Result Value Ref Range    Heparin Unfractionated 0.5 See Comment Below for Therapeutic Ranges IU/mL                                                                                Imaging           7/29/2024  IMPRESSION:  1. Please refer to detailed swallow study evaluation by speech  pathologist.      2. No radiographic evidence of acute osseous abnormalities within  limits of the current examination. I personally reviewed the  images/study and I agree with the findings as stated by Fritz Mackenzie DO PGY-2. This study was interpreted at [place], [place].                                                                               GI Procedures     8/31/2016  Findings:       The esophagus was normal.       The stomach was normal.       The examined duodenum was normal.       Placement of an externally removable PEG with no T-fasteners was        successfully completed. The external bumper was at the 4.5 cm marking on        the tube. Estimated blood loss was minimal.  Impression:            - Normal esophagus.                         -  Normal stomach.                         - Normal examined duodenum.                         - No specimens collected.      ASSESSMENT / PLAN                  Assessment and Recommendations:      Caryl Tucker is a 53 y.o. female w/PMH of CVA with Lt hemiparesis and dysphagia (2016) s/p PEG, removed 3 years ago, PE/DVT on eliquis admitted to MICU 7/21/2024 for HHS and hypcapnic respiratory failure requiring intubation. Course c/b Vfib 7/22. Pt has not been able to tolerate PO intake. GI was consulted for PEG placement.     MBBS on 7/29 showed severe oropharyngeal dysphagia. Seen be SLP who recommended NPO vs pleasure feeding. Of note ongoing discussion with cardiology regarding Vfib work up which includes LHC/CT coronaries which likely require aspirin/plavix necessiate enteral access. After GOC with POA, decision was made for PEG re-placement. Consent obtained from POA.     -Please hold TF at midnight preceding PEG  -Please order IV cefazolin 2 gram x1 dose and send it down with patient to the endoscopy lab so GI endoscopy can administer 30 minutes prior to EGD w/ PEG placement (if patient has penicillin or cephalosporin hypersensitivity, can use vancomycin 1 gram [preferably] or clindamycin 900mg IV)'      BARBARA per primary team.   ------------------------------------------------------------------------  Isaura Merlos MD   Gastroenterology Fellow    After 5PM and on Weekends, please page on-call fellow.    Case discussed with service attending Dr. Hendrix. To be formally seen with Dr. Lo.  Final recommendations pending attending attestation.

## 2024-08-01 NOTE — H&P (VIEW-ONLY)
[facility]   [facility]  INITIAL CONSULT NOTE     Source of Information: The source of the history was chart review     Consult requested by: Service: Medicine    Reason for Consult: PEG    Admission Chief Complaint: HSS      SUBJECTIVE     HPI: Caryl Tucker is a 53 y.o. female w/PMH of CVA with Lt hemiparesis and dysphagia (2016) s/p PEG, removed 3 years ago, PE/DVT on eliquis admitted to MICU 7/21/2024 for HHS and hypcapnic respiratory failure requiring intubation. Course c/b Vfib 7/22. Pt has not been able to tolerate PO intake. GI was consulted for PEG placement.     MBBS on 7/29 showed severe oropharyngeal dysphagia. Seen be SLP who recommended NPO vs pleasure feeding. Of note ongoing discussion with cardiology regarding Vfib work up which includes LHC/CT coronaries which likely require aspirin/plavix necessiate enteral access. After GOC with POA, decision was made for PEG re-placement. Not on AC.    WBC 10.7, Hb 11.0.      ROS: Complete review of systems obtained, negative unless otherwise indicated above.     Allergies   Allergen Reactions    Penicillins Anaphylaxis     Past Medical History:   Diagnosis Date    Anemia 2016    Chronic pulmonary edema (HHS-HCC) 2016    Dysarthria following cerebral infarction 2016    Dysphagia following cerebral infarction 2016    Dyspnea 2016    Encephalopathy 2016    Facial weakness following cerebral infarction 2016    Hemiplegia and hemiparesis following cerebral infarction affecting left non-dominant side (Multi) 2016    Hypertension 2016    Personal history of DVT (deep vein thrombosis) 2016    Personal history of pulmonary embolism 2016     Past Surgical History:   Procedure Laterality Date    GASTROSTOMY  08/31/2016    Originally placed 2016. Replaced 05/15/2018, 07/31/2018, 02/25/2021, 04/2021, 07/2021, 06/2022    MR HEAD ANGIO WO IV CONTRAST  08/19/2016    MR HEAD ANGIO WO IV CONTRAST 8/19/2016 [facility] CLINICAL  LEGACY    MR NECK ANGIO WO IV CONTRAST  08/19/2016    MR NECK ANGIO WO IV CONTRAST 8/19/2016 [facility] CLINICAL LEGACY    PEG TUBE REMOVAL      Unknown Date     No family history on file.  Social History     Social History Narrative    Not on file       Medications:  aspirin, 81 mg, oral, Daily  atorvastatin, 40 mg, oral, Nightly  insulin glargine, 8 Units, subcutaneous, Nightly  insulin lispro, 0-10 Units, subcutaneous, q6h  [Held by provider] insulin lispro, 5 Units, subcutaneous, TID  metoprolol, 5 mg, intravenous, Once  nitroglycerin, 0.8 mg, sublingual, Once  polyethylene glycol, 17 g, oral, Daily  senna, 5 mL, oral, Nightly      PRN medications: dextrose, glucagon, glucagon, heparin, LORazepam, metoprolol, metoprolol, metoprolol, metoprolol, metoprolol tartrate, ondansetron      EXAM     Vital signs:      7/31/2024     8:33 PM 8/1/2024    12:05 AM 8/1/2024     3:45 AM 8/1/2024     6:00 AM 8/1/2024     8:00 AM 8/1/2024    10:31 AM 8/1/2024     5:09 PM   Vitals   Systolic 116 135 138  139 158 134   Diastolic 74 69 77  81 74 79   Heart Rate 80    78 88 85  88 83 89   Temp 36.5 °C (97.7 °F) 36.5 °C (97.7 °F) 36.5 °C (97.7 °F)  37.3 °C (99.1 °F) 37 °C (98.6 °F) 35.8 °C (96.4 °F)   Resp 18 16 18  18 18 18   Weight (lb)    230.38      BMI    32.13 kg/m2      BSA (m2)    2.29 m2            Physical Exam   General: Alert   Eyes: EOMI, PERRLA  ENT: MMM  Heart: RRR  Lungs: No respiratory distress  Abdomen: Soft, non tender, non distended  Skin: No jaundice   Neuro: Alert, Lt side weakness      DATA                                                                            Labs     Results for orders placed or performed during the hospital encounter of 07/21/24 (from the past 24 hour(s))   POCT GLUCOSE   Result Value Ref Range    POCT Glucose 101 (H) 74 - 99 mg/dL   POCT GLUCOSE   Result Value Ref Range    POCT Glucose 107 (H) 74 - 99 mg/dL   POCT GLUCOSE   Result Value Ref Range    POCT Glucose 131 (H) 74 - 99 mg/dL    Comprehensive Metabolic Panel   Result Value Ref Range    Glucose 123 (H) 74 - 99 mg/dL    Sodium 135 (L) 136 - 145 mmol/L    Potassium 4.5 3.5 - 5.3 mmol/L    Chloride 100 98 - 107 mmol/L    Bicarbonate 25 21 - 32 mmol/L    Anion Gap 15 10 - 20 mmol/L    Urea Nitrogen 4 (L) 6 - 23 mg/dL    Creatinine 0.73 0.50 - 1.05 mg/dL    eGFR >90 >60 mL/min/1.73m*2    Calcium 8.4 (L) 8.6 - 10.6 mg/dL    Albumin 3.2 (L) 3.4 - 5.0 g/dL    Alkaline Phosphatase 95 33 - 110 U/L    Total Protein 7.1 6.4 - 8.2 g/dL    AST 54 (H) 9 - 39 U/L    Bilirubin, Total 0.5 0.0 - 1.2 mg/dL    ALT 35 7 - 45 U/L   Magnesium   Result Value Ref Range    Magnesium 1.93 1.60 - 2.40 mg/dL   Phosphorus   Result Value Ref Range    Phosphorus 3.5 2.5 - 4.9 mg/dL   Bilirubin, Direct   Result Value Ref Range    Bilirubin, Direct 0.0 0.0 - 0.3 mg/dL   Heparin Assay, UFH   Result Value Ref Range    Heparin Unfractionated 0.5 See Comment Below for Therapeutic Ranges IU/mL                                                                                Imaging           7/29/2024  IMPRESSION:  1. Please refer to detailed swallow study evaluation by speech  pathologist.      2. No radiographic evidence of acute osseous abnormalities within  limits of the current examination. I personally reviewed the  images/study and I agree with the findings as stated by Fritz Mackenzie DO PGY-2. This study was interpreted at [place], [place].                                                                               GI Procedures     8/31/2016  Findings:       The esophagus was normal.       The stomach was normal.       The examined duodenum was normal.       Placement of an externally removable PEG with no T-fasteners was        successfully completed. The external bumper was at the 4.5 cm marking on        the tube. Estimated blood loss was minimal.  Impression:            - Normal esophagus.                         -  Normal stomach.                         - Normal examined duodenum.                         - No specimens collected.      ASSESSMENT / PLAN                  Assessment and Recommendations:      Caryl Tucker is a 53 y.o. female w/PMH of CVA with Lt hemiparesis and dysphagia (2016) s/p PEG, removed 3 years ago, PE/DVT on eliquis admitted to MICU 7/21/2024 for HHS and hypcapnic respiratory failure requiring intubation. Course c/b Vfib 7/22. Pt has not been able to tolerate PO intake. GI was consulted for PEG placement.     MBBS on 7/29 showed severe oropharyngeal dysphagia. Seen be SLP who recommended NPO vs pleasure feeding. Of note ongoing discussion with cardiology regarding Vfib work up which includes LHC/CT coronaries which likely require aspirin/plavix necessiate enteral access. After GOC with POA, decision was made for PEG re-placement. Consent obtained from POA.     -Please hold TF at midnight preceding PEG  -Please order IV cefazolin 2 gram x1 dose and send it down with patient to the endoscopy lab so GI endoscopy can administer 30 minutes prior to EGD w/ PEG placement (if patient has penicillin or cephalosporin hypersensitivity, can use vancomycin 1 gram [preferably] or clindamycin 900mg IV)'      BARBARA per primary team.   ------------------------------------------------------------------------  Isaura Merlos MD   Gastroenterology Fellow    After 5PM and on Weekends, please page on-call fellow.    Case discussed with service attending Dr. Hendrix. To be formally seen with Dr. Lo.  Final recommendations pending attending attestation.

## 2024-08-02 ENCOUNTER — APPOINTMENT (OUTPATIENT)
Dept: GASTROENTEROLOGY | Facility: HOSPITAL | Age: 54
DRG: 208 | End: 2024-08-02
Payer: MEDICARE

## 2024-08-02 ENCOUNTER — ANESTHESIA (OUTPATIENT)
Dept: GASTROENTEROLOGY | Facility: HOSPITAL | Age: 54
End: 2024-08-02
Payer: MEDICARE

## 2024-08-02 ENCOUNTER — ANESTHESIA EVENT (OUTPATIENT)
Dept: GASTROENTEROLOGY | Facility: HOSPITAL | Age: 54
End: 2024-08-02
Payer: MEDICARE

## 2024-08-02 LAB
ALBUMIN SERPL BCP-MCNC: 3 G/DL (ref 3.4–5)
ALP SERPL-CCNC: 97 U/L (ref 33–110)
ALT SERPL W P-5'-P-CCNC: 29 U/L (ref 7–45)
ANION GAP SERPL CALC-SCNC: 14 MMOL/L (ref 10–20)
AST SERPL W P-5'-P-CCNC: 40 U/L (ref 9–39)
BASOPHILS # BLD AUTO: 0.03 X10*3/UL (ref 0–0.1)
BASOPHILS NFR BLD AUTO: 0.4 %
BILIRUB DIRECT SERPL-MCNC: 0.1 MG/DL (ref 0–0.3)
BILIRUB SERPL-MCNC: 0.5 MG/DL (ref 0–1.2)
BUN SERPL-MCNC: 5 MG/DL (ref 6–23)
CALCIUM SERPL-MCNC: 8.3 MG/DL (ref 8.6–10.6)
CHLORIDE SERPL-SCNC: 100 MMOL/L (ref 98–107)
CO2 SERPL-SCNC: 30 MMOL/L (ref 21–32)
CREAT SERPL-MCNC: 0.76 MG/DL (ref 0.5–1.05)
EGFRCR SERPLBLD CKD-EPI 2021: >90 ML/MIN/1.73M*2
EOSINOPHIL # BLD AUTO: 0.21 X10*3/UL (ref 0–0.7)
EOSINOPHIL NFR BLD AUTO: 2.5 %
ERYTHROCYTE [DISTWIDTH] IN BLOOD BY AUTOMATED COUNT: 18.8 % (ref 11.5–14.5)
GLUCOSE BLD MANUAL STRIP-MCNC: 106 MG/DL (ref 74–99)
GLUCOSE BLD MANUAL STRIP-MCNC: 109 MG/DL (ref 74–99)
GLUCOSE BLD MANUAL STRIP-MCNC: 114 MG/DL (ref 74–99)
GLUCOSE BLD MANUAL STRIP-MCNC: 117 MG/DL (ref 74–99)
GLUCOSE BLD MANUAL STRIP-MCNC: 124 MG/DL (ref 74–99)
GLUCOSE SERPL-MCNC: 116 MG/DL (ref 74–99)
HCT VFR BLD AUTO: 35.6 % (ref 36–46)
HGB BLD-MCNC: 10.9 G/DL (ref 12–16)
IMM GRANULOCYTES # BLD AUTO: 0.17 X10*3/UL (ref 0–0.7)
IMM GRANULOCYTES NFR BLD AUTO: 2.1 % (ref 0–0.9)
LYMPHOCYTES # BLD AUTO: 1.83 X10*3/UL (ref 1.2–4.8)
LYMPHOCYTES NFR BLD AUTO: 22.1 %
MAGNESIUM SERPL-MCNC: 1.64 MG/DL (ref 1.6–2.4)
MCH RBC QN AUTO: 26.7 PG (ref 26–34)
MCHC RBC AUTO-ENTMCNC: 30.6 G/DL (ref 32–36)
MCV RBC AUTO: 87 FL (ref 80–100)
MONOCYTES # BLD AUTO: 0.78 X10*3/UL (ref 0.1–1)
MONOCYTES NFR BLD AUTO: 9.4 %
NEUTROPHILS # BLD AUTO: 5.26 X10*3/UL (ref 1.2–7.7)
NEUTROPHILS NFR BLD AUTO: 63.5 %
NRBC BLD-RTO: 0.7 /100 WBCS (ref 0–0)
PHOSPHATE SERPL-MCNC: 3.4 MG/DL (ref 2.5–4.9)
PLATELET # BLD AUTO: 253 X10*3/UL (ref 150–450)
POTASSIUM SERPL-SCNC: 3.9 MMOL/L (ref 3.5–5.3)
PROT SERPL-MCNC: 6.6 G/DL (ref 6.4–8.2)
RBC # BLD AUTO: 4.08 X10*6/UL (ref 4–5.2)
SODIUM SERPL-SCNC: 140 MMOL/L (ref 136–145)
WBC # BLD AUTO: 8.3 X10*3/UL (ref 4.4–11.3)

## 2024-08-02 PROCEDURE — 97164 PT RE-EVAL EST PLAN CARE: CPT | Mod: GP | Performed by: PHYSICAL THERAPIST

## 2024-08-02 PROCEDURE — 2500000001 HC RX 250 WO HCPCS SELF ADMINISTERED DRUGS (ALT 637 FOR MEDICARE OP)

## 2024-08-02 PROCEDURE — P9045 ALBUMIN (HUMAN), 5%, 250 ML: HCPCS | Mod: JZ

## 2024-08-02 PROCEDURE — 83735 ASSAY OF MAGNESIUM: CPT

## 2024-08-02 PROCEDURE — 2500000004 HC RX 250 GENERAL PHARMACY W/ HCPCS (ALT 636 FOR OP/ED): Mod: JZ

## 2024-08-02 PROCEDURE — 97112 NEUROMUSCULAR REEDUCATION: CPT | Mod: GO

## 2024-08-02 PROCEDURE — 97112 NEUROMUSCULAR REEDUCATION: CPT | Mod: GP | Performed by: PHYSICAL THERAPIST

## 2024-08-02 PROCEDURE — 2500000005 HC RX 250 GENERAL PHARMACY W/O HCPCS

## 2024-08-02 PROCEDURE — 84100 ASSAY OF PHOSPHORUS: CPT

## 2024-08-02 PROCEDURE — 7100000010 HC PHASE TWO TIME - EACH INCREMENTAL 1 MINUTE

## 2024-08-02 PROCEDURE — 0DH63UZ INSERTION OF FEEDING DEVICE INTO STOMACH, PERCUTANEOUS APPROACH: ICD-10-PCS | Performed by: STUDENT IN AN ORGANIZED HEALTH CARE EDUCATION/TRAINING PROGRAM

## 2024-08-02 PROCEDURE — 3700000002 HC GENERAL ANESTHESIA TIME - EACH INCREMENTAL 1 MINUTE

## 2024-08-02 PROCEDURE — 2500000004 HC RX 250 GENERAL PHARMACY W/ HCPCS (ALT 636 FOR OP/ED)

## 2024-08-02 PROCEDURE — 2780000003 HC OR 278 NO HCPCS

## 2024-08-02 PROCEDURE — 3700000001 HC GENERAL ANESTHESIA TIME - INITIAL BASE CHARGE

## 2024-08-02 PROCEDURE — 99232 SBSQ HOSP IP/OBS MODERATE 35: CPT

## 2024-08-02 PROCEDURE — 2500000004 HC RX 250 GENERAL PHARMACY W/ HCPCS (ALT 636 FOR OP/ED): Performed by: STUDENT IN AN ORGANIZED HEALTH CARE EDUCATION/TRAINING PROGRAM

## 2024-08-02 PROCEDURE — 43235 EGD DIAGNOSTIC BRUSH WASH: CPT | Performed by: INTERNAL MEDICINE

## 2024-08-02 PROCEDURE — 36415 COLL VENOUS BLD VENIPUNCTURE: CPT

## 2024-08-02 PROCEDURE — 2500000002 HC RX 250 W HCPCS SELF ADMINISTERED DRUGS (ALT 637 FOR MEDICARE OP, ALT 636 FOR OP/ED)

## 2024-08-02 PROCEDURE — 7100000009 HC PHASE TWO TIME - INITIAL BASE CHARGE

## 2024-08-02 PROCEDURE — 85025 COMPLETE CBC W/AUTO DIFF WBC: CPT

## 2024-08-02 PROCEDURE — 97168 OT RE-EVAL EST PLAN CARE: CPT | Mod: GO

## 2024-08-02 PROCEDURE — 82947 ASSAY GLUCOSE BLOOD QUANT: CPT

## 2024-08-02 PROCEDURE — 84075 ASSAY ALKALINE PHOSPHATASE: CPT

## 2024-08-02 PROCEDURE — 99232 SBSQ HOSP IP/OBS MODERATE 35: CPT | Performed by: NURSE PRACTITIONER

## 2024-08-02 PROCEDURE — 1100000001 HC PRIVATE ROOM DAILY

## 2024-08-02 PROCEDURE — 82248 BILIRUBIN DIRECT: CPT

## 2024-08-02 RX ORDER — DROPERIDOL 2.5 MG/ML
0.62 INJECTION, SOLUTION INTRAMUSCULAR; INTRAVENOUS ONCE AS NEEDED
Status: DISCONTINUED | OUTPATIENT
Start: 2024-08-02 | End: 2024-08-06 | Stop reason: HOSPADM

## 2024-08-02 RX ORDER — METOPROLOL TARTRATE 1 MG/ML
5 INJECTION, SOLUTION INTRAVENOUS ONCE AS NEEDED
Status: DISCONTINUED | OUTPATIENT
Start: 2024-08-02 | End: 2024-08-06 | Stop reason: HOSPADM

## 2024-08-02 RX ORDER — GLYCOPYRROLATE 0.2 MG/ML
INJECTION INTRAMUSCULAR; INTRAVENOUS AS NEEDED
Status: DISCONTINUED | OUTPATIENT
Start: 2024-08-02 | End: 2024-08-02

## 2024-08-02 RX ORDER — METOPROLOL TARTRATE 1 MG/ML
5 INJECTION, SOLUTION INTRAVENOUS ONCE
Status: COMPLETED | OUTPATIENT
Start: 2024-08-02 | End: 2024-08-05

## 2024-08-02 RX ORDER — NITROGLYCERIN 0.4 MG/1
0.8 TABLET SUBLINGUAL ONCE
Status: COMPLETED | OUTPATIENT
Start: 2024-08-02 | End: 2024-08-05

## 2024-08-02 RX ORDER — METOPROLOL TARTRATE 1 MG/ML
5 INJECTION, SOLUTION INTRAVENOUS ONCE AS NEEDED
Status: COMPLETED | OUTPATIENT
Start: 2024-08-02 | End: 2024-08-05

## 2024-08-02 RX ORDER — ONDANSETRON HYDROCHLORIDE 2 MG/ML
4 INJECTION, SOLUTION INTRAVENOUS ONCE AS NEEDED
Status: DISCONTINUED | OUTPATIENT
Start: 2024-08-02 | End: 2024-08-06 | Stop reason: HOSPADM

## 2024-08-02 RX ORDER — SODIUM CHLORIDE, SODIUM LACTATE, POTASSIUM CHLORIDE, CALCIUM CHLORIDE 600; 310; 30; 20 MG/100ML; MG/100ML; MG/100ML; MG/100ML
100 INJECTION, SOLUTION INTRAVENOUS CONTINUOUS
Status: DISCONTINUED | OUTPATIENT
Start: 2024-08-02 | End: 2024-08-02

## 2024-08-02 RX ORDER — VANCOMYCIN HYDROCHLORIDE 1 G/200ML
1 INJECTION, SOLUTION INTRAVENOUS ONCE
Status: DISCONTINUED | OUTPATIENT
Start: 2024-08-02 | End: 2024-08-02

## 2024-08-02 RX ORDER — PROPOFOL 10 MG/ML
INJECTION, EMULSION INTRAVENOUS AS NEEDED
Status: DISCONTINUED | OUTPATIENT
Start: 2024-08-02 | End: 2024-08-02

## 2024-08-02 RX ORDER — LIDOCAINE HYDROCHLORIDE 20 MG/ML
INJECTION, SOLUTION INFILTRATION; PERINEURAL AS NEEDED
Status: DISCONTINUED | OUTPATIENT
Start: 2024-08-02 | End: 2024-08-02

## 2024-08-02 RX ORDER — MAGNESIUM SULFATE HEPTAHYDRATE 40 MG/ML
2 INJECTION, SOLUTION INTRAVENOUS ONCE
Status: COMPLETED | OUTPATIENT
Start: 2024-08-02 | End: 2024-08-02

## 2024-08-02 RX ORDER — CEFAZOLIN SODIUM 2 G/100ML
2 INJECTION, SOLUTION INTRAVENOUS ONCE
Status: COMPLETED | OUTPATIENT
Start: 2024-08-02 | End: 2024-08-02

## 2024-08-02 RX ORDER — METOPROLOL TARTRATE 50 MG/1
100 TABLET ORAL ONCE AS NEEDED
Status: COMPLETED | OUTPATIENT
Start: 2024-08-02 | End: 2024-08-05

## 2024-08-02 RX ORDER — METOPROLOL TARTRATE 50 MG/1
100 TABLET ORAL ONCE
Status: COMPLETED | OUTPATIENT
Start: 2024-08-02 | End: 2024-08-05

## 2024-08-02 RX ORDER — MENTHOL AND ZINC OXIDE .44; 20.625 G/100G; G/100G
1 OINTMENT TOPICAL 4 TIMES DAILY PRN
Status: DISCONTINUED | OUTPATIENT
Start: 2024-08-02 | End: 2024-08-06 | Stop reason: HOSPADM

## 2024-08-02 RX ORDER — ALBUMIN HUMAN 50 G/1000ML
SOLUTION INTRAVENOUS AS NEEDED
Status: DISCONTINUED | OUTPATIENT
Start: 2024-08-02 | End: 2024-08-02

## 2024-08-02 RX ORDER — LORAZEPAM 2 MG/ML
0.5 INJECTION INTRAMUSCULAR EVERY 5 MIN PRN
Status: DISCONTINUED | OUTPATIENT
Start: 2024-08-02 | End: 2024-08-06 | Stop reason: HOSPADM

## 2024-08-02 RX ORDER — ESMOLOL HYDROCHLORIDE 10 MG/ML
INJECTION INTRAVENOUS AS NEEDED
Status: DISCONTINUED | OUTPATIENT
Start: 2024-08-02 | End: 2024-08-02

## 2024-08-02 ASSESSMENT — PAIN - FUNCTIONAL ASSESSMENT
PAIN_FUNCTIONAL_ASSESSMENT: 0-10

## 2024-08-02 ASSESSMENT — PAIN SCALES - GENERAL
PAINLEVEL_OUTOF10: 0 - NO PAIN

## 2024-08-02 ASSESSMENT — COGNITIVE AND FUNCTIONAL STATUS - GENERAL
TURNING FROM BACK TO SIDE WHILE IN FLAT BAD: A LOT
EATING MEALS: A LOT
HELP NEEDED FOR BATHING: A LOT
WALKING IN HOSPITAL ROOM: TOTAL
DAILY ACTIVITIY SCORE: 12
STANDING UP FROM CHAIR USING ARMS: TOTAL
TOILETING: A LOT
PERSONAL GROOMING: A LOT
MOVING FROM LYING ON BACK TO SITTING ON SIDE OF FLAT BED WITH BEDRAILS: A LOT
MOVING TO AND FROM BED TO CHAIR: TOTAL
MOBILITY SCORE: 8
DRESSING REGULAR UPPER BODY CLOTHING: A LOT
CLIMB 3 TO 5 STEPS WITH RAILING: TOTAL
DRESSING REGULAR LOWER BODY CLOTHING: A LOT

## 2024-08-02 ASSESSMENT — ACTIVITIES OF DAILY LIVING (ADL)
BATHING_ASSISTANCE: MODERATE
ADL_ASSISTANCE: NEEDS ASSISTANCE

## 2024-08-02 NOTE — CARE PLAN
The patient's goals for the shift include  bg above 70    The clinical goals for the shift include no falls or injury, bp below 150 systolic      Problem: Skin  Goal: Participates in plan/prevention/treatment measures  Outcome: Progressing  Flowsheets (Taken 8/2/2024 1751)  Participates in plan/prevention/treatment measures:   Discuss with provider PT/OT consult   Elevate heels  Goal: Prevent/manage excess moisture  Outcome: Progressing  Flowsheets (Taken 8/2/2024 1751)  Prevent/manage excess moisture:   Monitor for/manage infection if present   Cleanse incontinence/protect with barrier cream  Goal: Prevent/minimize sheer/friction injuries  Outcome: Progressing  Flowsheets (Taken 8/2/2024 1751)  Prevent/minimize sheer/friction injuries:   Turn/reposition every 2 hours/use positioning/transfer devices   HOB 30 degrees or less  Goal: Promote/optimize nutrition  Outcome: Progressing  Flowsheets (Taken 8/2/2024 1751)  Promote/optimize nutrition:   Monitor/record intake including meals   Reassess MST if dietician not consulted  Goal: Promote skin healing  Outcome: Progressing  Flowsheets (Taken 8/2/2024 1751)  Promote skin healing:   Turn/reposition every 2 hours/use positioning/transfer devices   Protective dressings over bony prominences     Problem: Fall/Injury  Goal: Not fall by end of shift  Outcome: Progressing  Goal: Be free from injury by end of the shift  Outcome: Progressing  Goal: Verbalize understanding of personal risk factors for fall in the hospital  Outcome: Progressing  Goal: Verbalize understanding of risk factor reduction measures to prevent injury from fall in the home  Outcome: Progressing  Goal: Use assistive devices by end of the shift  Outcome: Progressing  Goal: Pace activities to prevent fatigue by end of the shift  Outcome: Progressing     Problem: Pain - Adult  Goal: Verbalizes/displays adequate comfort level or baseline comfort level  Outcome: Progressing     Problem: Safety - Adult  Goal:  Free from fall injury  Outcome: Progressing     Problem: Discharge Planning  Goal: Discharge to home or other facility with appropriate resources  Outcome: Progressing     Problem: Chronic Conditions and Co-morbidities  Goal: Patient's chronic conditions and co-morbidity symptoms are monitored and maintained or improved  Outcome: Progressing     Problem: Diabetes  Goal: Achieve decreasing blood glucose levels by end of shift  Outcome: Progressing  Goal: Increase stability of blood glucose readings by end of shift  Outcome: Progressing  Goal: Decrease in ketones present in urine by end of shift  Outcome: Progressing  Goal: Maintain electrolyte levels within acceptable range throughout shift  Outcome: Progressing  Goal: Maintain glucose levels >70mg/dl to <250mg/dl throughout shift  Outcome: Progressing  Goal: No changes in neurological exam by end of shift  Outcome: Progressing  Goal: Learn about and adhere to nutrition recommendations by end of shift  Outcome: Progressing  Goal: Vital signs within normal range for age by end of shift  Outcome: Progressing  Goal: Increase self care and/or family involovement by end of shift  Outcome: Progressing  Goal: Receive DSME education by end of shift  Outcome: Progressing

## 2024-08-02 NOTE — PROGRESS NOTES
Physical Therapy    Physical Therapy Re-Evaluation & Treatment    Patient Name: Caryl Tucker  MRN: [id number]  Today's Date: 8/2/2024   Time Calculation  Start Time: 1102  Stop Time: 1131  Time Calculation (min): 29 min    Assessment/Plan   PT Assessment  PT Assessment Results: Decreased strength, Decreased endurance, Impaired balance, Decreased mobility, Decreased cognition, Impaired vision, Obesity  Rehab Prognosis: Fair  Evaluation/Treatment Tolerance: Patient tolerated treatment well  End of Session Communication: Bedside nurse  Assessment Comment: Pt. was seen for re-assessment s/p v-fib arrest. Pt. presents with weakness, impaired balance, decreased endurance, and difficulty with all functional mobility compared to baseline. Pt. would benefit from skilled PT while IP to address these deficits.  End of Session Patient Position: Bed, 3 rail up, Alarm off, not on at start of session   IP OR SWING BED PT PLAN  Inpatient or Swing Bed: Inpatient  PT Plan  Treatment/Interventions: Bed mobility, Transfer training, Balance training, Strengthening, Endurance training, Therapeutic exercise, Therapeutic activity, Home exercise program  PT Plan: Ongoing PT  PT Frequency: 3 times per week  PT Discharge Recommendations: Moderate intensity level of continued care  PT Recommended Transfer Status: Assist x2  PT - OK to Discharge: Yes (PT re-eval complete and rehab recommendations made.)      Subjective     General Visit Information:  General  Reason for Referral: Pt. seen for re-eval secondary to change in status: noted to go into pulseless V-fib arrest, per telemetry review patient had increasing episodes of NSVT and bigeminy prior to this arrest. The patient required 3 shocks on 7/31. New orders placed per request of medical team and family.  Past Medical History Relevant to Rehab: CVA c/b left hemiparesis and dysphagia (2016) s/p PEG placement (2016, replaced 5 times, removed unknown date), HTN, PE/DVT on  "[place]  Family/Caregiver Present: No  Co-Treatment: OT  Co-Treatment Reason: to max pt safety and indep while targeting discipline specific tasks  Prior to Session Communication: Bedside nurse  Patient Position Received: Bed, 3 rail up, Alarm off, not on at start of session  General Comment: Pt supine in bed upon arrival, pleasant and motivated to participate in therapy. reports goal of \"wanting to walk again\".  Home Living:  Home Living  Type of Home: Long Term Care facility ([facility]; pt reports working with therapy -- states she was working on SPT and also José Luis lift to , questionable historian)  Prior Level of Function:  Prior Function Per Pt/Caregiver Report  Level of [place]: Needs assistance with ADLs, Needs assistance with homemaking, Needs assistance with functional transfers  ADL Assistance: Needs assistance (dependent for all care at facility prior to admission)  Ambulatory Assistance:  (Pt. non-ambulatory since CVA '16)  Precautions:  Precautions  Medical Precautions: Fall precautions  Vital Signs:  Vital Signs  Heart Rate: 88  SpO2: 100 %  BP: 146/74    Objective   Pain:  Pain Assessment  Pain Assessment: 0-10  0-10 (Numeric) Pain Score: 0 - No pain  Cognition:  Cognition  Arousal/Alertness: Appropriate responses to stimuli  Orientation Level: Disoriented to situation (Pt julio identifies self, location, and month /year)  Following Commands: Follows one step commands with repetition  Insight: Mild  Processing Speed: Delayed    General Assessments:          Activity Tolerance  Endurance: Tolerates 10 - 20 min exercise with multiple rests    Sensation  Light Touch: No apparent deficits    Strength  Strength Comments: LUE and LLE weakness compared to RUE and RLE d/t hx CVA; no active movement L shoulder flexion, minimal elbow flexion/ext, digit flexion/ext; +edema L hand. RUE:   5/5, elbow flexion/ext 3+/5, shoulder flexion 3-/5; LLE: PF/DF 2/5, knee ext <3-/5; RLE: PF/DF 3+/5, knee ext " 3/5  Postural Control  Postural Control: Impaired (Neck R SB/rotation, rounded shoulders, slight L shoulder subluxation. +forward head posture.)    Static Sitting Balance  Static Sitting-Balance Support: Feet unsupported (RUE supported on pillows, LUE in lap.)  Static Sitting-Level of Assistance:  (min-mod A with breif periods of CGA with max cues for upright posture)  Dynamic Sitting Balance  Dynamic Sitting-Balance Support: Feet unsupported, No upper extremity supported  Dynamic Sitting-Balance: Reaching across midline, Lateral lean, Forward lean (mod A)       Functional Assessments:  Bed Mobility  Bed Mobility: Yes  Bed Mobility 1  Bed Mobility 1: Supine to sitting, Sitting to supine  Level of Assistance 1: Maximum assistance, +2  Bed Mobility Comments 1: use of drawsheet, VCs for sequencing/ reaching across midline  Bed Mobility 2  Bed Mobility  2: Scooting (boost to reposition at end of session)  Level of Assistance 2: Maximum assistance, +2  Bed Mobility 3  Bed Mobility 3: Rolling right  Level of Assistance 3: Dependent  Bed Mobility 4  Bed Mobility 4: Rolling left  Level of Assistance 4: Maximum assistance    Transfers  Transfer: No    Ambulation/Gait Training  Ambulation/Gait Training Performed: No    Stairs  Stairs: No      Treatments:  Balance/Neuromuscular Re-Education  Balance/Neuromuscular Re-Education Activity 1: Pt. sat EOB ~ 20 minutes during session. Initially mod-max A secondary to heavy R lateral lean. Improved with cues for midline positioning. Performed AROM neck rotation/SB x 10 ea R/L. Pt. performed reaching within/outside JOAO with R UE with mod A for dynamic sitting balance. Performed weight shifting anterior/posterior and R/L lateral with cues and assist to return to midline upright sitting.  Outcome Measures:  [facility] Basic Mobility  Turning from your back to your side while in a flat bed without using bedrails: A lot  Moving from lying on your back to sitting on the side of a flat bed  without using bedrails: A lot  Moving to and from bed to chair (including a wheelchair): Total  Standing up from a chair using your arms (e.g. wheelchair or bedside chair): Total  To walk in hospital room: Total  Climbing 3-5 steps with railing: Total  Basic Mobility - Total Score: 8    Encounter Problems       Encounter Problems (Active)       PT Problem       Patient will complete bed mobility with MAXx1 with HOB elevated, use of bedrail and draw sheet as needed         Start:  07/24/24    Expected End:  08/16/24            Patient will complete stand-pivot transfer with MAXx2 with assist without acute LOB, while maintaining stable vitals.        Start:  07/24/24    Expected End:  08/16/24            Patient will complete static (contact guard assist x1) and dynamic (minimal assist x1) sitting balance activities using UE support as needed in order to maintain midline without acute LOB.        Start:  07/24/24    Expected End:  08/16/24            Patient will participate in BLE there-ex program in order to assist in improving strength and to assist with the completion of functional mobility tasks.        Start:  07/24/24    Expected End:  08/16/24            Patient will sit EOB for >/=30 mins with assist as needed, while maintaining stable vitals in order to improve sitting activity tolerance.        Start:  07/24/24    Expected End:  08/16/24               Pain - Adult              Education Documentation  Precautions, taught by Yas Rene, PT at 8/2/2024  1:15 PM.  Learner: Patient  Readiness: Acceptance  Method: Explanation  Response: Verbalizes Understanding, Needs Reinforcement    Body Mechanics, taught by Yas Rene PT at 8/2/2024  1:15 PM.  Learner: Patient  Readiness: Acceptance  Method: Explanation  Response: Verbalizes Understanding, Needs Reinforcement    Mobility Training, taught by Yas Rene PT at 8/2/2024  1:15 PM.  Learner: Patient  Readiness: Acceptance  Method:  Explanation  Response: Verbalizes Understanding, Needs Reinforcement    Education Comments  No comments found.

## 2024-08-02 NOTE — ANESTHESIA POSTPROCEDURE EVALUATION
Patient: Caryl Tucker    Procedure Summary       Date: 08/02/24 Room / Location: [facility]    Anesthesia Start: 1417 Anesthesia Stop: 1458    Procedure: EGD Diagnosis: Oropharyngeal dysphagia    Scheduled Providers: Georgia Aldana MD Responsible Provider: Maria Luisa Lilly MD    Anesthesia Type: MAC ASA Status: 4            Anesthesia Type: MAC    Vitals Value Taken Time   /72 08/02/24 1533   Temp 36 °C (96.8 °F) 08/02/24 1501   Pulse 95 08/02/24 1533   Resp 18 08/02/24 1533   SpO2 94 % 08/02/24 1533       Anesthesia Post Evaluation    Patient location during evaluation: PACU  Patient participation: complete - patient participated  Level of consciousness: awake and alert  Pain management: adequate  Airway patency: patent  Cardiovascular status: acceptable  Respiratory status: acceptable  Hydration status: acceptable  Postoperative Nausea and Vomiting: none        There were no known notable events for this encounter.

## 2024-08-02 NOTE — PROGRESS NOTES
Occupational Therapy    Re-Evaluation/Treatment    Patient Name: Caryl Tucker  MRN: [id number]  : 1970  Today's Date: 24  Time Calculation  Start Time: 1102  Stop Time: 1131  Time Calculation (min): 29 min       Assessment:  End of Session Communication: Bedside nurse  End of Session Patient Position: Bed, 3 rail up, Alarm off, not on at start of session  OT Assessment Results: Decreased ADL status, Decreased upper extremity range of motion, Decreased upper extremity strength, Decreased endurance, Decreased cognition, Decreased fine motor control, Decreased functional mobility, Decreased IADLs, Decreased gross motor control, Decreased trunk control for functional activities    Plan:  Treatment Interventions: ADL retraining, Functional transfer training, Endurance training, UE strengthening/ROM, Cognitive reorientation, Patient/family training, Equipment evaluation/education, Neuromuscular reeducation, Fine motor coordination activities, Compensatory technique education  OT Frequency: 3 times per week  OT Discharge Recommendations: Moderate intensity level of continued care  OT Recommended Transfer Status: Assist of 2  OT - OK to Discharge: Yes    Subjective   Current Problem:  1. Rash and nonspecific skin eruption  Referral to Primary Care    Referral to Rheumatology    Referral to Dermatology      2. Altered mental status, unspecified altered mental status type  Monitor intake and output      3. Pneumonia of left lower lobe due to infectious organism        4. Acute on chronic respiratory failure with hypoxia and hypercapnia (Multi)  CANCELED: Transthoracic Echo (TTE) Complete    CANCELED: Transthoracic Echo (TTE) Complete      5. Hyperglycemic hyperosmolar nonketotic coma (Multi)        6. Septic shock (Multi)  Transthoracic Echo (TTE) Complete    Transthoracic Echo (TTE) Complete    Transthoracic Echo (TTE) Limited    Transthoracic Echo (TTE) Limited      7. ZULMA (acute kidney injury) (CMS-HCC)         8. Shock, unspecified (Multi)  Transthoracic Echo (TTE) Complete      9. Ischemic heart disease  CANCELED: Case Request Cath Lab: Left Heart Cath with Coronary Angiography and LV    CANCELED: Case Request Cath Lab: Left Heart Cath with Coronary Angiography and LV    CANCELED: Cardiac Catheterization Procedure    CANCELED: Cardiac Catheterization Procedure      10. Cardiac arrest with ventricular fibrillation (Multi)  Referral to Cardiology    CANCELED: Case Request Cath Lab: Left Heart Cath with Coronary Angiography and LV    CANCELED: Case Request Cath Lab: Left Heart Cath with Coronary Angiography and LV    CANCELED: Cardiac Catheterization Procedure    CANCELED: Cardiac Catheterization Procedure      11. Acute on chronic respiratory failure with hypercapnia (Multi)  Monitor intake and output      12. Oropharyngeal dysphagia  Esophagogastroduodenoscopy (EGD) w PEG Tube Placement        General:   OT Received On: 08/02/24  General  Reason for Referral: admit from [facility]: found to have an ZULMA with hyperkalemia, hyperglycemia, glucose >1400, and the CT concerning for proctitis, she was treated with antibiotics, started with insulin drip, required Levophed briefly, and was intubated for airway protection. Around 11:52 PM, ptwas noted to go into pulseless V-fib arrest, per telemetry review patient had increasing episodes of NSVT and bigeminy prior to this arrest. The patient required 3 shocks.q  Past Medical History Relevant to Rehab: CVA c/b left hemiparesis and dysphagia (2016) s/p PEG placement (2016, replaced 5 times, removed unknown date), HTN, PE/DVT on Eliquis  Co-Treatment: PT  Co-Treatment Reason: to max pt safety and indep while targeting discipline specific tasks  Prior to Session Communication: Bedside nurse  Patient Position Received: Bed, 3 rail up, Alarm off, not on at start of session  General Comment: Pt supine in bed upon arrival, willing to participate in therapy. Pt highly motivated and  "reports goal of \"wanting to walk again\".    Precautions:  Medical Precautions: Fall precautions  Precautions Comment: Mild L shoulder sublux, RN aware; pt positioned optimally /with LUE support at end of session    Vital Signs:  Heart Rate: 82  SpO2: 96 %  BP: 146/74    Pain:  Pain Assessment  Pain Assessment: 0-10    Objective   Cognition:  Orientation Level: Disoriented to situation (Pt julio identifies self, location, and month /year)  Following Commands: Follows multistep commands with repetition  Attention:  (Mild decreased sustained attention; requires VCs for sustained attention to task)  Memory:  (Demos deficits with STM)  Insight: Mild    Home Living:  Type of Home: Long Term Care facility ([facility]; pt reports working with therapy -- states she was working on SPT and also José Luis lift to , questionable historian)    Prior Function:  Hand Dominance: Right  Prior Function Comments: Pt reports assistance for all BADL and func transfers from staff at [facility]    IADL History:  Leisure and Hobbies: Enjoys watching game shows, doing puzzles, listening to music    ADL:  Eating Assistance: Moderate (anticipate)  Grooming Assistance: Moderate (anticipate)  Bathing Assistance: Moderate (anticipate)  UE Dressing Assistance: Moderate (don /doff hosp gown)  LE Dressing Assistance: Maximal (don /doff B socks)  Toileting Assistance with Device: Maximal (anticipate)    Bed Mobility/Transfers:   Bed Mobility 1  Bed Mobility 1: Supine to sitting, Sitting to supine  Level of Assistance 1: Maximum assistance (x2)  Bed Mobility Comments 1: use of drawsheet and HOB slightly elevated; VCs for body mechanics    Bed Mobility 2  Bed Mobility  2: Scooting  Level of Assistance 2: Maximum assistance  Bed Mobility Comments 2: use of drawsheet; assist at B hips    Bed Mobility 3  Bed Mobility 3:  (Boost towards HOB)  Level of Assistance 3: Dependent (x2)  Bed Mobility Comments 3: use of drawsheet    Sitting Balance:  Static Sitting " Balance  Static Sitting-Comment/Number of Minutes: EOB with MIN Ax1 at trunk and support under BUEs  Dynamic Sitting Balance  Dynamic Sitting-Comments: EOB with MOD Ax1 at trunk and support under BUEs    Therapy/Activity:   Balance/Neuromuscular Re-Education Activity 1:    BUE AAROM (1x10 each exercise) -  *Shoulder flexion /aBduction /elevation   *Elbow flexion /extension  *Wrist flexion /extension   *Digit flexion /extension     AAROM (1x5 each exercise) -  *Cervical rotation  *Cervical flexion /extension  *Cervical lateral flexion (R/L)     *Dynamic reaching exercises in various planes with MOD A a trunk    Vision:  Vision - Basic Assessment  Current Vision: Does not wear glasses    Vision - Complex Assessment  Head Position:  (Head tilt to R; able to improve posture upon VCs for short duration)  Tracking: Decreased smoothness of horizontal tracking, Decreased smoothness of vertical tracking  Saccades: Decreased speed of saccadic movement (R side)    Sensation:  Sensation Comment: Denies N/T; no apparent deficits    Hand Function:  Hand Function  Gross Grasp:  (R/L 3+/5)    Extremities:   RUE Strength  RUE Overall Strength: Greater than or equal to 3/5 as evidenced by functional mobility and LUE     LUE: Exceptions to WFL  LUE AROM (degrees)  L Shoulder Flexion  0-170: 110 Degrees  L Shoulder ABduction 0-160/180: 90 Degrees    LUE Strength  L Shoulder Flexion: 1/5  L Shoulder ABduction: 1/5  L Elbow Flexion: 1/5  L Elbow Extension: 1/5    Outcome Measures:   [facility] Daily Activity  Putting on and taking off regular lower body clothing: A lot  Bathing (including washing, rinsing, drying): A lot  Putting on and taking off regular upper body clothing: A lot  Toileting, which includes using toilet, bedpan or urinal: A lot  Taking care of personal grooming such as brushing teeth: A lot  Eating Meals: A lot  Daily Activity - Total Score: 12    Education Documentation  Body Mechanics, taught by Cara Resendiz OT at  8/2/2024 12:12 PM.  Learner: Patient  Readiness: Acceptance  Method: Explanation  Response: Verbalizes Understanding    Precautions, taught by Cara Resendiz OT at 8/2/2024 12:12 PM.  Learner: Patient  Readiness: Acceptance  Method: Explanation  Response: Verbalizes Understanding    ADL Training, taught by Cara Resendiz OT at 8/2/2024 12:12 PM.  Learner: Patient  Readiness: Acceptance  Method: Explanation  Response: Verbalizes Understanding    Education Comments  No comments found.      Goals:  Encounter Problems       Encounter Problems (Active)       ADLs       Patient with complete upper body dressing with minimal assist  level of assistance donning and doffing all UE clothes while supported sitting (Progressing)       Start:  07/24/24    Expected End:  08/23/24            Patient will complete daily grooming tasks  with set-up level of assistance and PRN adaptive equipment while supported sitting. (Progressing)       Start:  07/24/24    Expected End:  08/23/24               COGNITION/SAFETY       Patient will score WFL on standardized cognitive assessment (CAM-) (Progressing)       Start:  07/24/24    Expected End:  08/23/24               TRANSFERS       Patient will perform bed mobility maximal assist level of assistance and bed rails in order to improve safety and independence with mobility (Progressing)       Start:  07/24/24    Expected End:  08/23/24            Patient will complete functional transfer to Bsc/Chair  with least restrictive device with maximum assist x2 level of assistance. (Progressing)       Start:  07/24/24    Expected End:  08/23/24                 ---------------  Cara Resendiz (OTR/L, OTD)  Inpatient Occupational Therapist   Rehab Office: [phone]

## 2024-08-02 NOTE — ANESTHESIA PREPROCEDURE EVALUATION
Patient: Caryl Tucker    Procedure Information       Date/Time: 08/02/24 1530    Scheduled providers: Georgia Aldana MD    Procedure: EGD    Location: [facility]        53F w/ CVA c/b left hemiparesis and dysphagia (2016) s/p PEG placement (2016, replaced 5 times, removed unknown date), HTN, PE/DVT on Eliquis . Presented with HHS and respiratory failure on 7/21 with vfib arrest on 7/22. Plan for further cardiac workup after PEG placement.     Relevant Problems   Cardiac   (+) Cardiac arrest with ventricular fibrillation (Multi)      Skin   (+) Rash and nonspecific skin eruption       Clinical information reviewed:   Tobacco  Allergies  Meds   Med Hx  Surg Hx   Fam Hx  Soc Hx        NPO Detail:  NPO/Void Status  Date of Last Liquid: 08/01/24  Date of Last Solid: 08/01/24         Physical Exam    Airway  Mallampati: IV  TM distance: >3 FB  Neck ROM: full     Cardiovascular - normal exam     Dental    Pulmonary - normal exam     Abdominal   (+) obese             Anesthesia Plan    History of general anesthesia?: yes  History of complications of general anesthesia?: no    ASA 4     MAC     intravenous induction   Anesthetic plan and risks discussed with patient.  Use of blood products discussed with patient who.    Plan discussed with CRNA and attending.

## 2024-08-02 NOTE — PROGRESS NOTES
"Subjective   For PEG today  SR 80s  -150s  Denies CP/ SOB/dizziness, palpitations        Objective   Visit Vitals  /82   Pulse 84   Temp 36.2 °C (97.2 °F)   Resp 18   Ht 1.803 m (5' 11\")   Wt 104 kg (230 lb 6.1 oz)   SpO2 95%   BMI 32.13 kg/m²   Smoking Status Never   BSA 2.29 m²      Physical Exam  General: awake, alert, no acute distress  HEENT: no scleral icterus, room air   CV: RRR  Resp: CTA anteriorly   Extremities: No edema    Cardiographics  Transthoracic Echo (TTE) Limited 7/22:  1. The left ventricular systolic function is normal, with a visually estimated ejection fraction of 65-70%.  2. There is moderate left ventricular hypertrophy.  3. There is normal right ventricular global systolic function.  4. The pulmonary artery is not well visualized.  5. On call limited fellow echo.       Lab Review   Lab Results   Component Value Date     08/02/2024    K 3.9 08/02/2024     08/02/2024    CO2 30 08/02/2024    BUN 5 (L) 08/02/2024    CREATININE 0.76 08/02/2024    GLUCOSE 116 (H) 08/02/2024    CALCIUM 8.3 (L) 08/02/2024     Lab Results   Component Value Date    WBC 8.3 08/02/2024    HGB 10.9 (L) 08/02/2024    HCT 35.6 (L) 08/02/2024    MCV 87 08/02/2024     08/02/2024        Assessment/Plan   53F w/ CVA c/b left hemiparesis and dysphagia (2016) s/p PEG placement (2016, replaced 5 times, removed unknown date), HTN, PE/DVT on Eliquis who was sent to the ED by [facility] for evaluation of encephalopathy. She was found to have an ZULMA with hyperkalemia, hyperglycemia, glucose greater than 1400, and the CT concerning for proctitis, she was treated with antibiotics, started with insulin drip, required Levophed briefly, and was intubated for airway protection.     Around 11:52 PM, patient was noted to go into pulseless V-fib arrest, per telemetry review patient had increasing episodes of NSVT and bigeminy prior to this arrest. The patient required 3 shocks. She received epi x " 3, amiodarone 300mg, calcium x 1 , HCO3 x 1 during the code. She  intermittently had ROSC in between episodes.      8/1 Primary team had GOC discussion with patient and her POA (Aunt). They wish to proceed with ischemic eval/CMR/ICD  as indicated    Impression   #VF arrest iso episodes of NSVT and electolyte imbalances   #Elevated troponin - peaked at 955  #No parenteral access      - Once PEG tube placed, please place NEW order Coronary CTA for ischemic eval  for Monday. Make NPO after MN  -If ischemic eval negative, will plan CMR to further assess etiology  - will then subsequently plan EP involvement to consider ICD placement for secondary prevention if within GOC  - Keep K+>4 and Mg+ >2   - Continue Heparin bridge to Eliquis (hx of DVT/PE) until determined all procedures complete    Cardiology will follow peripherally until able to obtain CCTA   Case discussed with Dr. Angel Cotto, APRN-CNP  Cardiology Consults    Please call with any questions  Pager [phone] M-F 7a-6p; Saturday 7a-2p  Pager [phone] all other times

## 2024-08-02 NOTE — PROGRESS NOTES
Progress Note, 8/2/2024:    History of Present Illness  Caryl Tucker is a 53 y.o. female currently on day 12 of admission (after 4d 3h in ICU) for HHS and acute hypercapnic respiratory failure requiring intubation c/b Vfib cardiac arrest.    Interval History  No events overnight. EGD with PEG tube placement today. Heparin on hold, IV Cefazolin 2g dose received pre-procedure.    Coronary CTA planned for Monday. If negative, will plan CMR to further assess etiology  Subsequently plan EP involvement to consider ICD placement for secondary prevention.    Subjective  Patient has no complaints and wants to get out of the hospital.    Objective    Vitals  Visit Vitals  /79   Pulse 78   Temp 36.2 °C (97.2 °F)   Resp 18        I/O    Intake/Output Summary (Last 24 hours) at 8/2/2024 0709  Last data filed at 8/1/2024 0900  Gross per 24 hour   Intake --   Output 200 ml   Net -200 ml        Physical Exam  Physical Exam    Labs  CBC:  Results from last 7 days   Lab Units 08/01/24  1258 07/31/24  0656 07/30/24  0829   WBC AUTO x10*3/uL 9.5 10.7 11.4*   HEMOGLOBIN g/dL 11.0* 11.0* 10.2*   PLATELETS AUTO x10*3/uL 260 234 232     BMP:  Results from last 7 days   Lab Units 08/01/24  0854 07/31/24  0655 07/30/24  0828   SODIUM mmol/L 135* 140 139   POTASSIUM mmol/L 4.5 3.5 3.5   CHLORIDE mmol/L 100 100 100   CO2 mmol/L 25 28 28   BUN mg/dL 4* 4* 5*   CREATININE mg/dL 0.73 0.75 0.78   GLUCOSE mg/dL 123* 127* 154*     LFT:  Results from last 7 days   Lab Units 08/01/24  0854 07/31/24  0655 07/30/24  0828   AST U/L 54* 67* 84*   ALT U/L 35 44 53*   ALK PHOS U/L 95 100 103   BILIRUBIN TOTAL mg/dL 0.5 0.5 0.5   BILIRUBIN DIRECT mg/dL 0.0 0.1 0.2     Cardiac:      Coag:  Results from last 7 days   Lab Units 07/29/24  0124   PROTIME seconds 11.5   APTT seconds 80*   INR  1.0     ABG:     .  UA:           Last EKG  Encounter Date: 07/21/24   Electrocardiogram, 12-lead PRN ACS symptoms   Result Value    Ventricular Rate 88    Atrial Rate  88    MS Interval 158    QRS Duration 118    QT Interval 378    QTC Calculation(Bazett) 457    P Axis 71    R Axis 20    T Axis 55    QRS Count 14    Q Onset 229    P Onset 150    P Offset 211    T Offset 418    QTC Fredericia 429    Narrative    Normal sinus rhythm  Nonspecific intraventricular conduction delay  Nonspecific ST and T wave abnormality  Abnormal ECG  When compared with ECG of 22-JUL-2024 00:22,  Sinus rhythm has replaced Junctional rhythm  ST no longer depressed in Inferior leads  Non-specific change in ST segment in Anterior leads  ST less depressed in Lateral leads  Confirmed by Aris Davila ([id number]) on 7/23/2024 5:22:40 PM        Imaging    FL modified barium swallow study    Result Date: 7/30/2024  Impression: Modified Barium Swallow Study completed. Verbal consent obtained. Pt awake/alert and conversant/cooperative throughout study.   Severe oropharyngeal dysphagia. Trials of thin liquids via tsp/straw, mildly (nectar) thick liquids via straw, honey (moderately) thick liquids via straw, and purees were given. Lingual pumping, reduced bolus control, and piecemeal swallow across all consistencies w/ poor oral clearance.   Posterior loss to the level of the pyriforms, delayed pharyngeal swallow initiation, and delayed epiglottic inversion/laryngeal vestibular closure resulted in moderate amount aspiration of single sips thin, mildly (nectar), and moderately (honey) thick liquids via straw. Pt inconsistently sensed aspiration (i.e., SILENT w/ mildly thick liquids) and attempted to clear w/ cough w/ minimal success. Trialed moderately (honey) thick liquids via chin tuck to aid in airway invasion w/ initial success; however, aspiration reduplicated after the swallow 2/2 residue from pyriforms spilling over the posterior laryngeal wall and below the level of the TVF. Improved timeliness of pharyngeal swallow w/ purees e/b no penetration/aspiration w/ x7 trials.   Pt not appropriate for initiation of  complete oral diet given aspiration of all liquid consistencies. Would recommend NPO w/ frequent aggressive oral care + question need for alternate means nutrition/hydration. 5-7 ice chips/hr allowed for pleasure, safe swallow stimulation, and after oral care to prevent buildup of bacteria within the oropharynx.   Of note, pt's home facility reports pt tolerating Pureed (Level 4) Solids & Pudding Thick Liquids at baseline/prior to admission; confirmed via MBSS that pt consistently demonstrates safety/efficiency w/ pureed consistency. However, modified diets/thickened liquids can encourage/place pt at risk of malnutrition/dehydration; would deem purees appropriate for pleasure and/or bolus-driven therapy only.   Provided extensive education to pt re: results of MBSS, recommendations, and options for dysphagia POC. Pt expressed significant disinterest in dobhoff or PEG tube replacement + is content w/ baseline diet of pureed solids & pudding thick liquids. Therefore, question need for GOC discussions re: assuming the risk of aspiration/malnutrition/dehydration w/ complete oral diet vs pleasure feeds w/ supplemental nutrition. MD aware.   [place]: Thin: 7 - Material enters airway, passes below the folds, not ejected despite effort. Nectar: 8 - Material enters airway, passes below the folds, no effort to eject (no cough). Honey: 8 - Material enters airway, passes below the folds, no effort to eject (no cough). Puree: 1 - Material does not enter airway.   Recommendations: NPO with frequent aggressive oral care + purees for pleasure and/or bolus-driven therapy only 5-7 ice chips/hr allowed for pleasure, safe swallow stimulation, and after oral care to prevent buildup of bacteria within the oropharynx Consider alternate means nutrition/hydration   vs GOC discussions re: assuming the risk of aspiration/malnutrition/dehydration w/ complete oral diet given pt's tolerance/content w/ baseline diet of Pureed (Level 4) Solids &  Pudding Thick Liquids   Goal: Pt will tolerate least restrictive diet with no overt clinical s/s aspiration 100% of the time. Start Date: 7/30/24 Expected Time Frame to Meet Goal: 2-3 weeks     Plan: SLP Services Indicated: Yes Frequency: pending [facility] Discussed POC with patient SLP - OK to Discharge   Pain: 0-10 0 = No pain.   Inpatient Education: Extensive education provided to patient regarding current swallow function, recommendations/results, and POC.   Consultations/Referrals/Coordination of Services: [facility]   Speech Therapy section of this report signed by Mena Jett on 7/30/2024 at 4:27 pm.   RADIOLOGY FINDINGS: No evidence of degenerative osseous changes to the cervical vertebrae.   Radiology section of this report signed by Dr. Huitron  .   IMPRESSION: 1. Please refer to detailed swallow study evaluation by speech pathologist.   2. No radiographic evidence of acute osseous abnormalities within limits of the current examination. I personally reviewed the images/study and I agree with the findings as stated by Fritz Mackenzie DO PGY-2. This study was interpreted at [place], [place].   MACRO: None   Signed by: Akhil Huitron 7/30/2024 6:15 PM Dictation workstation:   [id number]    XR chest 1 view    Result Date: 7/26/2024  1. Improved but residual bibasilar atelectasis, left more than right. No new airspace consolidation. 2. Medical devices as above.   I personally reviewed the images/study and I agree with the findings as stated by Wong Sexton DO, PGY-3. This study was interpreted at [place], [place].   Signed by: Vamsi Allred 7/26/2024 7:27 AM Dictation workstation:   [id number]      Inpatient Medications    Scheduled medications  aspirin, 81 mg, oral, Daily  atorvastatin, 40 mg, oral, Nightly  ceFAZolin, 2 g, intravenous, Once  insulin glargine, 8 Units, subcutaneous, Nightly  insulin lispro, 0-10 Units,  subcutaneous, q6h  [Held by provider] insulin lispro, 5 Units, subcutaneous, TID  metoprolol, 5 mg, intravenous, Once  nitroglycerin, 0.8 mg, sublingual, Once  polyethylene glycol, 17 g, oral, Daily  senna, 5 mL, oral, Nightly      Continuous medications  [Held by provider] heparin, 0-4,000 Units/hr, Last Rate: Stopped (08/02/24 0357)      PRN medications  PRN medications: dextrose, glucagon, glucagon, heparin, LORazepam, metoprolol, metoprolol, metoprolol, metoprolol, metoprolol tartrate, ondansetron      Assessment & Plan  Caryl Tucker is a 53 y.o. 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 Vfib arrest occurred 7/22- ROSC achieved. Patient currently on the floor and clinically stable.  Notably, patient has not been tolerating PO intake even in pureed foods, SLT recommended NPO with alternate means nutrition.  Discussed with patient and POA (aunt), decision made to proceed with PEG tube placement.    Principal Problem:  #Dysphagia  #History of Stroke  #PEG tube   - Patient was seen by SLT, recommendation made for NPO w/ alternate means nutrition/hydration + purees/pudding for pleasure and/or bolus-driven therapy.  - S/p PEG tube placement.  Plan:  Follow up GI recs    #Vfib #Cardiac arrest   #Elevated troponin  - Etiology of arrest likely electrolyte disturbances in the setting of HHS, although cannot rule out ischemic cause completely.  Plan:  Coronary CTA planned for Monday.   Monitor K (goal >4) and Mg (goal >2)  Holding heparin for PEG tube placement. Resume per GI recs.     #Transaminitis (Resolved)  - AST and ALT downtrending  Monitor LFTs     #UZLMA (Resolved)  - Resolved  Monitor RFPs     #COVID19 (Resolved)  Patient off precautions  Monitor CBC w/ differential      #Newly diagnosed diabetes mellitus   #HHS  C/w Lantus 8mg + SSI    Fluids: PRN  Electrolytes: PRN  Nutrition: NPO Diet; Effective now  Access: PRN  DVT prophylaxis: Heparin subq    Code Status: Full Code (Confirmed on admission)   Emergency Contact / NOK: Extended Emergency Contact Information  Primary Emergency Contact: Radha Osullivan  Home Phone: [phone]  Work Phone: [phone]  Relation: Power of   Secondary Emergency Contact: Jade Osullivan  Mobile Phone: [phone]  Relation: Relative       Juan Abdullahi MD, MSc  Internal Medicine PGY-1    8/2/2024

## 2024-08-03 LAB
ALBUMIN SERPL BCP-MCNC: 3 G/DL (ref 3.4–5)
ALP SERPL-CCNC: 99 U/L (ref 33–110)
ALT SERPL W P-5'-P-CCNC: 23 U/L (ref 7–45)
ANION GAP SERPL CALC-SCNC: 10 MMOL/L (ref 10–20)
AST SERPL W P-5'-P-CCNC: 32 U/L (ref 9–39)
BASOPHILS # BLD AUTO: 0.02 X10*3/UL (ref 0–0.1)
BASOPHILS NFR BLD AUTO: 0.3 %
BILIRUB DIRECT SERPL-MCNC: 0 MG/DL (ref 0–0.3)
BILIRUB SERPL-MCNC: 0.5 MG/DL (ref 0–1.2)
BUN SERPL-MCNC: 4 MG/DL (ref 6–23)
CALCIUM SERPL-MCNC: 8.7 MG/DL (ref 8.6–10.6)
CHLORIDE SERPL-SCNC: 98 MMOL/L (ref 98–107)
CO2 SERPL-SCNC: 33 MMOL/L (ref 21–32)
CREAT SERPL-MCNC: 0.71 MG/DL (ref 0.5–1.05)
EGFRCR SERPLBLD CKD-EPI 2021: >90 ML/MIN/1.73M*2
EOSINOPHIL # BLD AUTO: 0.21 X10*3/UL (ref 0–0.7)
EOSINOPHIL NFR BLD AUTO: 2.8 %
ERYTHROCYTE [DISTWIDTH] IN BLOOD BY AUTOMATED COUNT: 18.3 % (ref 11.5–14.5)
GLUCOSE BLD MANUAL STRIP-MCNC: 104 MG/DL (ref 74–99)
GLUCOSE BLD MANUAL STRIP-MCNC: 114 MG/DL (ref 74–99)
GLUCOSE BLD MANUAL STRIP-MCNC: 135 MG/DL (ref 74–99)
GLUCOSE BLD MANUAL STRIP-MCNC: 99 MG/DL (ref 74–99)
GLUCOSE SERPL-MCNC: 110 MG/DL (ref 74–99)
HCT VFR BLD AUTO: 35.8 % (ref 36–46)
HGB BLD-MCNC: 10.8 G/DL (ref 12–16)
IMM GRANULOCYTES # BLD AUTO: 0.07 X10*3/UL (ref 0–0.7)
IMM GRANULOCYTES NFR BLD AUTO: 0.9 % (ref 0–0.9)
LYMPHOCYTES # BLD AUTO: 1.64 X10*3/UL (ref 1.2–4.8)
LYMPHOCYTES NFR BLD AUTO: 22.1 %
MAGNESIUM SERPL-MCNC: 2.05 MG/DL (ref 1.6–2.4)
MCH RBC QN AUTO: 27.7 PG (ref 26–34)
MCHC RBC AUTO-ENTMCNC: 30.2 G/DL (ref 32–36)
MCV RBC AUTO: 92 FL (ref 80–100)
MONOCYTES # BLD AUTO: 0.47 X10*3/UL (ref 0.1–1)
MONOCYTES NFR BLD AUTO: 6.3 %
NEUTROPHILS # BLD AUTO: 5.01 X10*3/UL (ref 1.2–7.7)
NEUTROPHILS NFR BLD AUTO: 67.6 %
NRBC BLD-RTO: 0.4 /100 WBCS (ref 0–0)
PHOSPHATE SERPL-MCNC: 3.2 MG/DL (ref 2.5–4.9)
PLATELET # BLD AUTO: 256 X10*3/UL (ref 150–450)
POTASSIUM SERPL-SCNC: 3.4 MMOL/L (ref 3.5–5.3)
PROT SERPL-MCNC: 6.7 G/DL (ref 6.4–8.2)
RBC # BLD AUTO: 3.9 X10*6/UL (ref 4–5.2)
SODIUM SERPL-SCNC: 138 MMOL/L (ref 136–145)
UFH PPP CHRO-ACNC: 0.2 IU/ML
WBC # BLD AUTO: 7.4 X10*3/UL (ref 4.4–11.3)

## 2024-08-03 PROCEDURE — 85025 COMPLETE CBC W/AUTO DIFF WBC: CPT

## 2024-08-03 PROCEDURE — 82248 BILIRUBIN DIRECT: CPT

## 2024-08-03 PROCEDURE — 83735 ASSAY OF MAGNESIUM: CPT

## 2024-08-03 PROCEDURE — 80053 COMPREHEN METABOLIC PANEL: CPT

## 2024-08-03 PROCEDURE — 85520 HEPARIN ASSAY: CPT

## 2024-08-03 PROCEDURE — 99232 SBSQ HOSP IP/OBS MODERATE 35: CPT

## 2024-08-03 PROCEDURE — 36415 COLL VENOUS BLD VENIPUNCTURE: CPT

## 2024-08-03 PROCEDURE — 2500000001 HC RX 250 WO HCPCS SELF ADMINISTERED DRUGS (ALT 637 FOR MEDICARE OP)

## 2024-08-03 PROCEDURE — 84100 ASSAY OF PHOSPHORUS: CPT

## 2024-08-03 PROCEDURE — 82947 ASSAY GLUCOSE BLOOD QUANT: CPT

## 2024-08-03 PROCEDURE — 2500000002 HC RX 250 W HCPCS SELF ADMINISTERED DRUGS (ALT 637 FOR MEDICARE OP, ALT 636 FOR OP/ED)

## 2024-08-03 PROCEDURE — 1100000001 HC PRIVATE ROOM DAILY

## 2024-08-03 RX ORDER — POTASSIUM CHLORIDE 1.5 G/1.58G
40 POWDER, FOR SOLUTION ORAL ONCE
Status: COMPLETED | OUTPATIENT
Start: 2024-08-03 | End: 2024-08-03

## 2024-08-03 ASSESSMENT — COGNITIVE AND FUNCTIONAL STATUS - GENERAL
STANDING UP FROM CHAIR USING ARMS: TOTAL
HELP NEEDED FOR BATHING: A LOT
MOVING TO AND FROM BED TO CHAIR: TOTAL
TOILETING: TOTAL
DAILY ACTIVITIY SCORE: 16
MOVING FROM LYING ON BACK TO SITTING ON SIDE OF FLAT BED WITH BEDRAILS: A LOT
DRESSING REGULAR UPPER BODY CLOTHING: A LOT
DAILY ACTIVITIY SCORE: 10
DRESSING REGULAR UPPER BODY CLOTHING: A LITTLE
DRESSING REGULAR LOWER BODY CLOTHING: TOTAL
PERSONAL GROOMING: A LOT
WALKING IN HOSPITAL ROOM: TOTAL
MOBILITY SCORE: 7
HELP NEEDED FOR BATHING: TOTAL
TURNING FROM BACK TO SIDE WHILE IN FLAT BAD: TOTAL
EATING MEALS: A LITTLE
MOVING TO AND FROM BED TO CHAIR: TOTAL
PERSONAL GROOMING: A LITTLE
CLIMB 3 TO 5 STEPS WITH RAILING: TOTAL
STANDING UP FROM CHAIR USING ARMS: TOTAL
MOVING FROM LYING ON BACK TO SITTING ON SIDE OF FLAT BED WITH BEDRAILS: A LOT
TURNING FROM BACK TO SIDE WHILE IN FLAT BAD: TOTAL
CLIMB 3 TO 5 STEPS WITH RAILING: TOTAL
TOILETING: A LOT
WALKING IN HOSPITAL ROOM: TOTAL
DRESSING REGULAR LOWER BODY CLOTHING: A LOT
MOBILITY SCORE: 7

## 2024-08-03 ASSESSMENT — PAIN SCALES - GENERAL: PAINLEVEL_OUTOF10: 0 - NO PAIN

## 2024-08-03 NOTE — PROGRESS NOTES
Progress Note, 8/3/2024:    History of Present Illness  Caryl Tucker is a 53 y.o. female currently on day 13 of admission (after 4d 3h in ICU) for HHS and acute hypercapnic respiratory failure requiring intubation c/b Vfib cardiac arrest.    Interval History  EGD tube placed yesterday. Patient receiving continuous enteral feeds for the moment.  Nutrition consulted.    Coronary CTA planned for Monday. If negative, will plan CMR to further assess etiology  Subsequently plan EP involvement to consider ICD placement for secondary prevention.    Subjective  No acute events overnight. Patient has no complaints.    Objective    Vitals  Visit Vitals  /83 (BP Location: Right arm, Patient Position: Lying)   Pulse 86   Temp 36.7 °C (98.1 °F) (Temporal)   Resp 18        I/O    Intake/Output Summary (Last 24 hours) at 8/3/2024 0715  Last data filed at 8/3/2024 0000  Gross per 24 hour   Intake 400 ml   Output 500 ml   Net -100 ml        Physical Exam  Physical Exam    Labs  CBC:  Results from last 7 days   Lab Units 08/02/24  0841 08/01/24  1258 07/31/24  0656   WBC AUTO x10*3/uL 8.3 9.5 10.7   HEMOGLOBIN g/dL 10.9* 11.0* 11.0*   PLATELETS AUTO x10*3/uL 253 260 234     BMP:  Results from last 7 days   Lab Units 08/02/24  0842 08/01/24  0854 07/31/24  0655   SODIUM mmol/L 140 135* 140   POTASSIUM mmol/L 3.9 4.5 3.5   CHLORIDE mmol/L 100 100 100   CO2 mmol/L 30 25 28   BUN mg/dL 5* 4* 4*   CREATININE mg/dL 0.76 0.73 0.75   GLUCOSE mg/dL 116* 123* 127*     LFT:  Results from last 7 days   Lab Units 08/02/24  0842 08/01/24  0854 07/31/24  0655   AST U/L 40* 54* 67*   ALT U/L 29 35 44   ALK PHOS U/L 97 95 100   BILIRUBIN TOTAL mg/dL 0.5 0.5 0.5   BILIRUBIN DIRECT mg/dL 0.1 0.0 0.1     Cardiac:      Coag:  Results from last 7 days   Lab Units 07/29/24  0124   PROTIME seconds 11.5   APTT seconds 80*   INR  1.0     ABG:     .  UA:           Last EKG  Encounter Date: 07/21/24   Electrocardiogram, 12-lead PRN ACS symptoms   Result  Value    Ventricular Rate 88    Atrial Rate 88    MN Interval 158    QRS Duration 118    QT Interval 378    QTC Calculation(Bazett) 457    P Axis 71    R Axis 20    T Axis 55    QRS Count 14    Q Onset 229    P Onset 150    P Offset 211    T Offset 418    QTC Fredericia 429    Narrative    Normal sinus rhythm  Nonspecific intraventricular conduction delay  Nonspecific ST and T wave abnormality  Abnormal ECG  When compared with ECG of 22-JUL-2024 00:22,  Sinus rhythm has replaced Junctional rhythm  ST no longer depressed in Inferior leads  Non-specific change in ST segment in Anterior leads  ST less depressed in Lateral leads  Confirmed by Aris Davila ([id number]) on 7/23/2024 5:22:40 PM        Imaging    FL modified barium swallow study    Result Date: 7/30/2024  Impression: Modified Barium Swallow Study completed. Verbal consent obtained. Pt awake/alert and conversant/cooperative throughout study.   Severe oropharyngeal dysphagia. Trials of thin liquids via tsp/straw, mildly (nectar) thick liquids via straw, honey (moderately) thick liquids via straw, and purees were given. Lingual pumping, reduced bolus control, and piecemeal swallow across all consistencies w/ poor oral clearance.   Posterior loss to the level of the pyriforms, delayed pharyngeal swallow initiation, and delayed epiglottic inversion/laryngeal vestibular closure resulted in moderate amount aspiration of single sips thin, mildly (nectar), and moderately (honey) thick liquids via straw. Pt inconsistently sensed aspiration (i.e., SILENT w/ mildly thick liquids) and attempted to clear w/ cough w/ minimal success. Trialed moderately (honey) thick liquids via chin tuck to aid in airway invasion w/ initial success; however, aspiration reduplicated after the swallow 2/2 residue from pyriforms spilling over the posterior laryngeal wall and below the level of the TVF. Improved timeliness of pharyngeal swallow w/ purees e/b no penetration/aspiration w/ x7 trials.    Pt not appropriate for initiation of complete oral diet given aspiration of all liquid consistencies. Would recommend NPO w/ frequent aggressive oral care + question need for alternate means nutrition/hydration. 5-7 ice chips/hr allowed for pleasure, safe swallow stimulation, and after oral care to prevent buildup of bacteria within the oropharynx.   Of note, pt's home facility reports pt tolerating Pureed (Level 4) Solids & Pudding Thick Liquids at baseline/prior to admission; confirmed via MBSS that pt consistently demonstrates safety/efficiency w/ pureed consistency. However, modified diets/thickened liquids can encourage/place pt at risk of malnutrition/dehydration; would deem purees appropriate for pleasure and/or bolus-driven therapy only.   Provided extensive education to pt re: results of MBSS, recommendations, and options for dysphagia POC. Pt expressed significant disinterest in dobhoff or PEG tube replacement + is content w/ baseline diet of pureed solids & pudding thick liquids. Therefore, question need for GOC discussions re: assuming the risk of aspiration/malnutrition/dehydration w/ complete oral diet vs pleasure feeds w/ supplemental nutrition. MD aware.   [place]beck: Thin: 7 - Material enters airway, passes below the folds, not ejected despite effort. Nectar: 8 - Material enters airway, passes below the folds, no effort to eject (no cough). Honey: 8 - Material enters airway, passes below the folds, no effort to eject (no cough). Puree: 1 - Material does not enter airway.   Recommendations: NPO with frequent aggressive oral care + purees for pleasure and/or bolus-driven therapy only 5-7 ice chips/hr allowed for pleasure, safe swallow stimulation, and after oral care to prevent buildup of bacteria within the oropharynx Consider alternate means nutrition/hydration   vs GOC discussions re: assuming the risk of aspiration/malnutrition/dehydration w/ complete oral diet given pt's tolerance/content w/  baseline diet of Pureed (Level 4) Solids & Pudding Thick Liquids   Goal: Pt will tolerate least restrictive diet with no overt clinical s/s aspiration 100% of the time. Start Date: 7/30/24 Expected Time Frame to Meet Goal: 2-3 weeks     Plan: SLP Services Indicated: Yes Frequency: pending [facility] Discussed POC with patient SLP - OK to Discharge   Pain: 0-10 0 = No pain.   Inpatient Education: Extensive education provided to patient regarding current swallow function, recommendations/results, and POC.   Consultations/Referrals/Coordination of Services: [facility]   Speech Therapy section of this report signed by Mena Jett on 7/30/2024 at 4:27 pm.   RADIOLOGY FINDINGS: No evidence of degenerative osseous changes to the cervical vertebrae.   Radiology section of this report signed by Dr. Huitron  .   IMPRESSION: 1. Please refer to detailed swallow study evaluation by speech pathologist.   2. No radiographic evidence of acute osseous abnormalities within limits of the current examination. I personally reviewed the images/study and I agree with the findings as stated by Fritz Mackenzie DO PGY-2. This study was interpreted at [place], [place].   MACRO: None   Signed by: Akhil Huitron 7/30/2024 6:15 PM Dictation workstation:   [id number]    XR chest 1 view    Result Date: 7/26/2024  1. Improved but residual bibasilar atelectasis, left more than right. No new airspace consolidation. 2. Medical devices as above.   I personally reviewed the images/study and I agree with the findings as stated by Wong Sexton DO, PGY-3. This study was interpreted at [place], [place].   Signed by: Vamsi Allred 7/26/2024 7:27 AM Dictation workstation:   [id number]      Inpatient Medications    Scheduled medications  aspirin, 81 mg, oral, Daily  atorvastatin, 40 mg, oral, Nightly  insulin glargine, 8 Units, subcutaneous, Nightly  insulin lispro, 0-10  Units, subcutaneous, q6h  [Held by provider] insulin lispro, 5 Units, subcutaneous, TID  metoprolol, 5 mg, intravenous, Once  metoprolol tartrate, 100 mg, oral, Once  nitroglycerin, 0.8 mg, sublingual, Once  polyethylene glycol, 17 g, oral, Daily  senna, 5 mL, oral, Nightly      Continuous medications  [Held by provider] heparin, 0-4,000 Units/hr, Last Rate: Stopped (08/02/24 0357)      PRN medications  PRN medications: dextrose, droperidol, glucagon, glucagon, heparin, LORazepam, menthol-zinc oxide, metoprolol, metoprolol, metoprolol, metoprolol, metoprolol tartrate, ondansetron, ondansetron, oxygen      Assessment & Plan  Caryl Tucker is a 53 y.o. female on day 13 of admission, PMH of CVA c/b left hemiparesis and dysphagia (2016) s/p PEG placement (2016, replaced 5 times, removed unknown date), HTN, PE/DVT on Eliquis, who was admitted to the MICU on 07/21/24 for HHS and acute hypercapnic respiratory failure requiring intubation. Vfib arrest occurred 7/22- ROSC achieved. Patient currently on the floor and clinically stable.  Notably, patient has not been tolerating PO intake even in pureed foods, SLT recommended NPO with alternate means nutrition.  PEG placed yesterday, patient receiving enteral feeds.    Principal Problem:  #Dysphagia  #History of Stroke  #PEG tube   - Patient was seen by SLT, recommendation made for NPO w/ alternate means nutrition/hydration + purees/pudding for pleasure and/or bolus-driven therapy.  - S/p PEG tube placement.  - Patient receiving continuous enteral feeds.    #Vfib #Cardiac arrest   #Elevated troponin  - Etiology of arrest likely electrolyte disturbances in the setting of HHS, although cannot rule out ischemic cause completely.  Plan:  Coronary CTA planned for Monday.   Monitor K (goal >4) and Mg (goal >2)  Holding heparin for PEG tube placement. Resume per GI recs.     #Transaminitis (Resolved)  Monitor LFTs     #ZULMA (Resolved)  Monitor RFPs     #COVID19 (Resolved)  Patient off  precautions  Monitor CBC w/ differential     #Newly diagnosed diabetes mellitus   #HHS  C/w Lantus 8mg + SSI  Reassess given initiation of enteral feeds    Fluids: PRN  Electrolytes: PRN  Nutrition: NPO Diet; Effective midnight  Enteral feeding with NPO Glucerna 1.5; PEG (percutaneous endoscopic gastric); 10; 100; Water; Sterile water; With each bolus  Access: PRN  DVT prophylaxis: Heparin subq    Code Status: Full Code (Confirmed on admission)   Emergency Contact / NOK: Extended Emergency Contact Information  Primary Emergency Contact: Radha Osullivan  Home Phone: [phone]  Work Phone: [phone]  Relation: Power of   Secondary Emergency Contact: [place]  Mobile Phone: [phone]  Relation: Relative       Juan Abdullahi MD, MSc  Internal Medicine PGY-1    8/3/2024

## 2024-08-03 NOTE — SIGNIFICANT EVENT
-PEG site clean, dry, and nonerythematous  -minimal pain to palpation  -External bumper loosened from 6.5cm to 7.5cm to allow ~1 cm between bumper and skin  -PEG spins 360 degrees without difficulty  -Able to withdraw gastric contents and infuse water without resistance  -OK to administer meds, water and enteral feeds through the PEG now  -Would avoid placing any gauze or barriers between the skin and external bumper to avoid buried bumper syndrome  -Monitor site daily for bleeding  -Ensure that patient has an abdominal binder on at all times to prevent self-extubation  -Nutrition consult for enteral feeding  -PEG supplies should be provided at discharge    GI will sign off. Call back with any question.    Results released to patient via [employer].   Renal fxn improved slightly but still above goal.  Need to clarify if he has seen renal.

## 2024-08-03 NOTE — CARE PLAN
The patient's goals for the shift include  get books    The clinical goals for the shift include Pt will have no complaints of pain throughout the shift    Over the shift, the patient made progress towards all goals.

## 2024-08-03 NOTE — CONSULTS
"Nutrition Note:   --->Reason for Re-Consult: Provider consult order, Tube feeding recommendations    PEG placed yesterday. Current TF order for Glucerna 1.5 @ 10ml/hr. See TF recs below.    Anthropometrics:  Height: 180.3 cm (5' 11\")   Weight: 104 kg (230 lb 6.1 oz)   BMI (Calculated): 32.15  IBW/kg (Dietitian Calculated): 78.2 kg  Percent of IBW: 129 %       Nutrition Significant Labs:  CBC Trend:   Results from last 7 days   Lab Units 08/02/24  0841 08/01/24  1258 07/31/24  0656 07/30/24  0829   WBC AUTO x10*3/uL 8.3 9.5 10.7 11.4*   RBC AUTO x10*6/uL 4.08 4.13 4.01 3.80*   HEMOGLOBIN g/dL 10.9* 11.0* 11.0* 10.2*   HEMATOCRIT % 35.6* 37.8 36.8 33.7*   MCV fL 87 92 92 89   PLATELETS AUTO x10*3/uL 253 260 234 232   BMP Trend:   Results from last 7 days   Lab Units 08/02/24  0842 08/01/24  0854 07/31/24  0655 07/30/24  0828   GLUCOSE mg/dL 116* 123* 127* 154*   CALCIUM mg/dL 8.3* 8.4* 8.6 8.5*   SODIUM mmol/L 140 135* 140 139   POTASSIUM mmol/L 3.9 4.5 3.5 3.5   CO2 mmol/L 30 25 28 28   CHLORIDE mmol/L 100 100 100 100   BUN mg/dL 5* 4* 4* 5*   CREATININE mg/dL 0.76 0.73 0.75 0.78   A1C:  Lab Results   Component Value Date    HGBA1C 12.1 (H) 07/21/2024   BG POCT trend:   Results from last 7 days   Lab Units 08/03/24  0825 08/03/24  0226 08/02/24 2026 08/02/24  1608 08/02/24  1149   POCT GLUCOSE mg/dL 114* 104* 109* 117* 106*   Liver Function Trend:   Results from last 7 days   Lab Units 08/02/24  0842 08/01/24  0854 07/31/24  0655 07/30/24  0828   ALK PHOS U/L 97 95 100 103   AST U/L 40* 54* 67* 84*   ALT U/L 29 35 44 53*   BILIRUBIN TOTAL mg/dL 0.5 0.5 0.5 0.5   Renal Lab Trend:   Results from last 7 days   Lab Units 08/02/24  0842 08/01/24  0854 07/31/24  0655 07/30/24  0828   POTASSIUM mmol/L 3.9 4.5 3.5 3.5   PHOSPHORUS mg/dL 3.4 3.5 3.7 3.3   SODIUM mmol/L 140 135* 140 139   MAGNESIUM mg/dL 1.64 1.93 1.74 1.75   EGFR mL/min/1.73m*2 >90 >90 >90 90   BUN mg/dL 5* 4* 4* 5*   CREATININE mg/dL 0.76 0.73 0.75 0.78 "     Medications:  Scheduled medications  aspirin, 81 mg, oral, Daily  atorvastatin, 40 mg, oral, Nightly  insulin glargine, 8 Units, subcutaneous, Nightly  insulin lispro, 0-10 Units, subcutaneous, q6h  [Held by provider] insulin lispro, 5 Units, subcutaneous, TID  metoprolol, 5 mg, intravenous, Once  metoprolol tartrate, 100 mg, oral, Once  nitroglycerin, 0.8 mg, sublingual, Once  polyethylene glycol, 17 g, oral, Daily  senna, 5 mL, oral, Nightly    Continuous medications  heparin, 0-4,000 Units/hr, Last Rate: Stopped (08/02/24 0357)    PRN medications  PRN medications: dextrose, droperidol, glucagon, glucagon, heparin, LORazepam, menthol-zinc oxide, metoprolol, metoprolol, metoprolol, metoprolol, metoprolol tartrate, ondansetron, ondansetron, oxygen    I/O:   Last BM Date: 08/01/24    Dietary Orders (From admission, onward)       Start     Ordered    08/05/24 0000  NPO Diet; Effective midnight  Diet effective midnight         08/02/24 1558    08/02/24 1800  Enteral feeding with NPO Glucerna 1.5; PEG (percutaneous endoscopic gastric); 10; 100; Water; Sterile water; With each bolus  Diet effective now        Comments: Flush before and after feeds.   Question Answer Comment   Tube feeding formula: Glucerna 1.5    Feeding route: PEG (percutaneous endoscopic gastric)    Tube feeding continuous rate (mL/hr): 10    Tube feeding flush (mL): 100    Flush type: Water    Water type: Sterile water    Flush frequency: With each bolus        08/02/24 1606                Estimated Needs:   Total Energy Estimated Needs (kCal): 7637-3706  Method for Estimating Needs: MSJ using 93 kgs = 1624 ;  ~23-25 kcals/kg of ideal BW  Total Protein Estimated Needs (g): 100 g  Method for Estimating Needs: ~1.3 gm/kg ideal BW  Total Fluid Estimated Needs (mL): per team        Nutrition Interventions/Recommendations:  1. Continue TF of Glucerna 1.5 @ 10ml/hr via PEG, advance 10ml Q6H to goal rate of 50ml/hr, only as tolerated  --FWF, per MD/team  (@ goal rate pt will receive a total of 910 mls free water/day)  --Above TF regimen provides 1800 kcals, 160 g CHO, 99 g pro--meets 100% estimated kcal needs, nearly 100% estimated pro needs    2. If pt tolerates continuous feeds and bolus feeds are desired thereafter, recommend 300mls Glucerna 1.5, 4 times/day, only as tolerated (total of ~5.1 cartons/day)  --Flush with 60mls pre and post feeds with additional FWF between feeds, per MD/team (TF + above FWF will provide pt a total of 1390 mls free water/day)  --Above TF regimen provides 1800 kcals, 160 g CHO, 99 g pro--meets 100% estimated kcal needs, nearly 100% estimated pro needs    TF Monitoring:  --RFP + mag daily  --Accuchecks Q6H or per team  --Daily weights        Nutrition Prescription for Enteral Nutrition: 1370-1050 kcals, 100 g pro/day        Nutrition Interventions:   Interventions: Enteral intake  Enteral Intake: Other (Comment) (begin TF via PEG)  Goal: TF of Glucerna 1.5 @ goal rate of 50ml/qw=0275 kcals, 99g pro    Nutrition Education: n/a       Nutrition Monitoring and Evaluation:  Food/Nutrient Related History Monitoring  Monitoring and Evaluation Plan: Enteral and parenteral nutrition intake  Enteral and Parenteral Nutrition Intake: Enteral nutrition intake  Criteria: tolerate TF @ goal rate of 50ml/hr    Body Composition/Growth/Weight History  Monitoring and Evaluation Plan: Weight  Weight: Measured weight  Criteria: wt reduction from fluids, as needed, then wt stabilization    Biochemical Data, Medical Tests and Procedures  Monitoring and Evaluation Plan: Electrolyte/renal panel, Glucose/endocrine profile  Electrolyte and Renal Panel: Magnesium, Phosphorus, Potassium, Sodium  Criteria: WNL  Glucose/Endocrine Profile: Glucose, casual  Criteria: glucose levels <180          Time Spent (min): 45 minutes

## 2024-08-04 VITALS
HEART RATE: 84 BPM | SYSTOLIC BLOOD PRESSURE: 131 MMHG | TEMPERATURE: 97.7 F | WEIGHT: 230.38 LBS | RESPIRATION RATE: 16 BRPM | DIASTOLIC BLOOD PRESSURE: 83 MMHG | OXYGEN SATURATION: 95 % | HEIGHT: 71 IN | BODY MASS INDEX: 32.25 KG/M2

## 2024-08-04 LAB
ALBUMIN SERPL BCP-MCNC: 3 G/DL (ref 3.4–5)
ALP SERPL-CCNC: 99 U/L (ref 33–110)
ALT SERPL W P-5'-P-CCNC: 18 U/L (ref 7–45)
ANION GAP SERPL CALC-SCNC: 12 MMOL/L (ref 10–20)
AST SERPL W P-5'-P-CCNC: 22 U/L (ref 9–39)
BASOPHILS # BLD AUTO: 0.03 X10*3/UL (ref 0–0.1)
BASOPHILS NFR BLD AUTO: 0.4 %
BILIRUB DIRECT SERPL-MCNC: 0 MG/DL (ref 0–0.3)
BILIRUB SERPL-MCNC: 0.4 MG/DL (ref 0–1.2)
BUN SERPL-MCNC: 6 MG/DL (ref 6–23)
CALCIUM SERPL-MCNC: 9 MG/DL (ref 8.6–10.6)
CHLORIDE SERPL-SCNC: 99 MMOL/L (ref 98–107)
CO2 SERPL-SCNC: 31 MMOL/L (ref 21–32)
CREAT SERPL-MCNC: 0.81 MG/DL (ref 0.5–1.05)
EGFRCR SERPLBLD CKD-EPI 2021: 86 ML/MIN/1.73M*2
EOSINOPHIL # BLD AUTO: 0.28 X10*3/UL (ref 0–0.7)
EOSINOPHIL NFR BLD AUTO: 3.6 %
ERYTHROCYTE [DISTWIDTH] IN BLOOD BY AUTOMATED COUNT: 18.3 % (ref 11.5–14.5)
GLUCOSE BLD MANUAL STRIP-MCNC: 116 MG/DL (ref 74–99)
GLUCOSE BLD MANUAL STRIP-MCNC: 118 MG/DL (ref 74–99)
GLUCOSE BLD MANUAL STRIP-MCNC: 148 MG/DL (ref 74–99)
GLUCOSE SERPL-MCNC: 145 MG/DL (ref 74–99)
HCT VFR BLD AUTO: 37 % (ref 36–46)
HGB BLD-MCNC: 10.9 G/DL (ref 12–16)
IMM GRANULOCYTES # BLD AUTO: 0.08 X10*3/UL (ref 0–0.7)
IMM GRANULOCYTES NFR BLD AUTO: 1 % (ref 0–0.9)
LYMPHOCYTES # BLD AUTO: 2.08 X10*3/UL (ref 1.2–4.8)
LYMPHOCYTES NFR BLD AUTO: 27 %
MAGNESIUM SERPL-MCNC: 1.83 MG/DL (ref 1.6–2.4)
MCH RBC QN AUTO: 26.9 PG (ref 26–34)
MCHC RBC AUTO-ENTMCNC: 29.5 G/DL (ref 32–36)
MCV RBC AUTO: 91 FL (ref 80–100)
MONOCYTES # BLD AUTO: 0.59 X10*3/UL (ref 0.1–1)
MONOCYTES NFR BLD AUTO: 7.7 %
NEUTROPHILS # BLD AUTO: 4.64 X10*3/UL (ref 1.2–7.7)
NEUTROPHILS NFR BLD AUTO: 60.3 %
NRBC BLD-RTO: 0.5 /100 WBCS (ref 0–0)
PHOSPHATE SERPL-MCNC: 3.6 MG/DL (ref 2.5–4.9)
PLATELET # BLD AUTO: 283 X10*3/UL (ref 150–450)
POTASSIUM SERPL-SCNC: 3.9 MMOL/L (ref 3.5–5.3)
PROT SERPL-MCNC: 6.7 G/DL (ref 6.4–8.2)
RBC # BLD AUTO: 4.05 X10*6/UL (ref 4–5.2)
SODIUM SERPL-SCNC: 138 MMOL/L (ref 136–145)
UFH PPP CHRO-ACNC: 0.4 IU/ML
UFH PPP CHRO-ACNC: 0.5 IU/ML
WBC # BLD AUTO: 7.7 X10*3/UL (ref 4.4–11.3)

## 2024-08-04 PROCEDURE — 85520 HEPARIN ASSAY: CPT

## 2024-08-04 PROCEDURE — 36415 COLL VENOUS BLD VENIPUNCTURE: CPT

## 2024-08-04 PROCEDURE — 2500000004 HC RX 250 GENERAL PHARMACY W/ HCPCS (ALT 636 FOR OP/ED)

## 2024-08-04 PROCEDURE — 2500000002 HC RX 250 W HCPCS SELF ADMINISTERED DRUGS (ALT 637 FOR MEDICARE OP, ALT 636 FOR OP/ED)

## 2024-08-04 PROCEDURE — 82947 ASSAY GLUCOSE BLOOD QUANT: CPT

## 2024-08-04 PROCEDURE — 99232 SBSQ HOSP IP/OBS MODERATE 35: CPT | Performed by: INTERNAL MEDICINE

## 2024-08-04 PROCEDURE — 1100000001 HC PRIVATE ROOM DAILY

## 2024-08-04 PROCEDURE — 83735 ASSAY OF MAGNESIUM: CPT

## 2024-08-04 PROCEDURE — 2500000001 HC RX 250 WO HCPCS SELF ADMINISTERED DRUGS (ALT 637 FOR MEDICARE OP)

## 2024-08-04 PROCEDURE — 84075 ASSAY ALKALINE PHOSPHATASE: CPT

## 2024-08-04 PROCEDURE — 85025 COMPLETE CBC W/AUTO DIFF WBC: CPT

## 2024-08-04 PROCEDURE — 84100 ASSAY OF PHOSPHORUS: CPT

## 2024-08-04 PROCEDURE — 82248 BILIRUBIN DIRECT: CPT

## 2024-08-04 RX ORDER — MAGNESIUM SULFATE HEPTAHYDRATE 40 MG/ML
2 INJECTION, SOLUTION INTRAVENOUS ONCE
Status: COMPLETED | OUTPATIENT
Start: 2024-08-04 | End: 2024-08-04

## 2024-08-04 RX ORDER — POTASSIUM CHLORIDE 1.5 G/1.58G
20 POWDER, FOR SOLUTION ORAL ONCE
Status: COMPLETED | OUTPATIENT
Start: 2024-08-04 | End: 2024-08-04

## 2024-08-04 ASSESSMENT — ACTIVITIES OF DAILY LIVING (ADL): LACK_OF_TRANSPORTATION: PATIENT UNABLE TO ANSWER

## 2024-08-04 ASSESSMENT — PAIN SCALES - GENERAL: PAINLEVEL_OUTOF10: 0 - NO PAIN

## 2024-08-04 NOTE — PROGRESS NOTES
Subjective   Caryl Tucker is a 53 y.o. female on hospital day 14 reports no significant events.  Denies any nausea or vomiting with tube feeds or any pain from her tube.    Objective     Exam     Vitals:    08/03/24 1700 08/04/24 0000 08/04/24 0434 08/04/24 1144   BP: 123/82 131/77 124/79 154/87   BP Location:       Patient Position:       Pulse: 81 89 85 84   Resp: 17 18 18 16   Temp: 35.8 °C (96.5 °F) 36.5 °C (97.7 °F) 36.4 °C (97.5 °F) 36.3 °C (97.3 °F)   TempSrc:       SpO2: 94% 93% 95% 95%   Weight:       Height:          Intake/Output last 3 shifts:  I/O last 3 completed shifts:  In: 1561 (14.9 mL/kg) [NG/GT:1561]  Out: 500 (4.8 mL/kg) [Urine:500 (0.1 mL/kg/hr)]  Weight: 104.5 kg     Physical Exam  Vitals reviewed.   Constitutional:       General: She is not in acute distress.     Appearance: She is obese. She is not ill-appearing, toxic-appearing or diaphoretic.   HENT:      Head: Normocephalic and atraumatic.   Cardiovascular:      Rate and Rhythm: Normal rate and regular rhythm.      Pulses: Normal pulses.      Heart sounds: No murmur heard.     No friction rub. No gallop.   Pulmonary:      Effort: Pulmonary effort is normal.      Breath sounds: No wheezing, rhonchi or rales.      Comments: Anteriorly clear  Abdominal:      General: Bowel sounds are normal. There is no distension.      Palpations: Abdomen is soft.      Tenderness: There is no guarding or rebound.      Comments: PEG tube with binder.  Minimal tenderness near PEG tube   Musculoskeletal:      Comments: 1+ bilateral lower extremity edema   Skin:     General: Skin is warm and dry.   Neurological:      Mental Status: She is alert.      Comments: Left-sided weakness which is chronic   Psychiatric:         Mood and Affect: Mood normal.         Behavior: Behavior normal.      Comments: Remains with odd affect            Medications   aspirin, 81 mg, oral, Daily  atorvastatin, 40 mg, oral, Nightly  insulin glargine, 8 Units, subcutaneous,  "Nightly  insulin lispro, 0-10 Units, subcutaneous, q6h  [Held by provider] insulin lispro, 5 Units, subcutaneous, TID  metoprolol, 5 mg, intravenous, Once  metoprolol tartrate, 100 mg, oral, Once  nitroglycerin, 0.8 mg, sublingual, Once  polyethylene glycol, 17 g, oral, Daily  senna, 5 mL, oral, Nightly       PRN medications: dextrose, droperidol, glucagon, glucagon, heparin, LORazepam, menthol-zinc oxide, metoprolol, metoprolol, metoprolol, metoprolol, metoprolol tartrate, ondansetron, ondansetron, oxygen       Labs     All new labs reviewed:  some of the basic labs as follows -     Results from last 7 days   Lab Units 08/04/24  0733 08/03/24  0812 08/02/24  0841   WBC AUTO x10*3/uL 7.7 7.4 8.3   HEMOGLOBIN g/dL 10.9* 10.8* 10.9*   HEMATOCRIT % 37.0 35.8* 35.6*   PLATELETS AUTO x10*3/uL 283 256 253   NEUTROS PCT AUTO % 60.3 67.6 63.5   LYMPHS PCT AUTO % 27.0 22.1 22.1   MONOS PCT AUTO % 7.7 6.3 9.4   EOS PCT AUTO % 3.6 2.8 2.5            Results from last 72 hours   Lab Units 08/04/24  0733 08/03/24  0812 08/02/24  0842   SODIUM mmol/L 138 138 140   POTASSIUM mmol/L 3.9 3.4* 3.9   CHLORIDE mmol/L 99 98 100   CO2 mmol/L 31 33* 30   BUN mg/dL 6 4* 5*   CREATININE mg/dL 0.81 0.71 0.76     Results from last 72 hours   Lab Units 08/04/24  0733 08/03/24  0812 08/02/24  0842   ALK PHOS U/L 99 99 97   AST U/L 22 32 40*   ALT U/L 18 23 29   BILIRUBIN TOTAL mg/dL 0.4 0.5 0.5   BILIRUBIN DIRECT mg/dL 0.0 0.0 0.1   ALBUMIN g/dL 3.0* 3.0* 3.0*   PROTEIN TOTAL g/dL 6.7 6.7 6.6     Results from last 72 hours   Lab Units 08/04/24  0733 08/03/24  0812 08/02/24  0842   GLUCOSE mg/dL 145* 110* 116*           No results found for: \"TR1\"  Lab Results   Component Value Date    URINECULTURE No growth 07/25/2024    BLOODCULT No growth at 4 days -  FINAL REPORT 07/25/2024    BLOODCULT No growth at 4 days -  FINAL REPORT 07/25/2024    BLOODCULT No growth at 4 days -  FINAL REPORT 07/21/2024    BLOODCULT Staphylococcus cohnii (AA) 07/21/2024 "            Imaging   Esophagogastroduodenoscopy (EGD) w PEG Tube Placement  Table formatting from the original result was not included.  Impression  PEG tube placement    Findings  The esophagus, stomach and duodenum appeared normal.  The patient was placed in the supine position for PEG placement. The   stomach was insufflated to appose gastric and abdominal walls. A site was   located in the body of the stomach with excellent transillumination and   manual external pressure for placement. The abdominal wall was marked and   prepped in a sterile manner. The area was anesthetized with 5 mL of 1%   lidocaine. The trocar needle was introduced through the abdominal wall and   into the stomach under direct endoscopic view. A snare was introduced   through the endoscope and opened in the gastric lumen. The guide wire was   passed through the trocar and into the open snare. The snare was closed   around the guide wire. The endoscope and snare were removed, pulling the   wire out through the mouth. A skin incision was made at the site of needle   insertion. The externally removable 20 Fr EndoVive Safety gastrostomy tube   was lubricated. The G-tube was passed over the guide wire through the   mouth, and into the stomach. The trocar needle was removed, and the   gastrostomy tube was pulled out from the stomach through the skin. The   guide wire was removed, and the external bumper attached to the   gastrostomy tube. The feeding tube was then cut to an appropriate length.   The final position of the gastrostomy tube was confirmed by relook   endoscopy, and skin marking noted to be 7 cm at the external bumper. The   final tension and compression of the abdominal wall by the PEG tube and   external bumper were checked and revealed that the bumper was loose and   lightly touching the skin. The feeding tube was capped, and the tube site   was cleaned and dressed. Estimated blood loss was minimal.    Recommendation  Follow up  with primary gastroenterologist   Post-PEG care  - PEG tube placed successfully with bumper at 7 cm  - Following procedure, can start using tube for meds/water in 3 hours at   8/2 6pm.  - Tube feeds as per nutrition; please flush before and after use.  - Can resume anticoagulants 12-24h post-procedure.  - Can shower/bathe 7 days after placement.  - Can use zinc oxide PRN to avoid irritation; if oozing, can trial silver   nitrate.  - Do not replace gauze under the external bumper (this may place tension   on the internal bumper, placing patient at risk for buried bumper   syndrome).  - If PEG is dislodged within 4 weeks of placement, please call GI to   discuss prior to inserting anything to keep the tract open; inserting a   tube to an immature tract carries a high risk of peritonitis.  - If tube is clogged, please try pancrealipase +/- sodium bicarbonate   flushes. If this does not work, the primary team or RN should try a   cytobrush to unclog tubing before calling GI.  -If any concerns, please do not hesitate to contact GI team         Indication  Oropharyngeal dysphagia    Staff  Staff Role   Georgia Aldana MD Proceduralist   Avni Frye MD Proceduralist     Medications  See Anesthesia Record.     Preprocedure  A history and physical has been performed, and patient medication   allergies have been reviewed. The patient's tolerance of previous   anesthesia has been reviewed. The risks and benefits of the procedure and   the sedation options and risks were discussed with the patient. All   questions were answered and informed consent obtained.    Details of the Procedure  The patient underwent monitored anesthesia care, which was administered by   an anesthesia professional. The patient's blood pressure, ECG, ETCO2,   heart rate, level of consciousness, oxygen and respirations were monitored   throughout the procedure. The scope was introduced through the mouth and   advanced to the second part of the  duodenum. Retroflexion was performed in   the cardia. Prior to the procedure, the patient's H. Pylori status was   unknown. The patient's estimated blood loss was minimal (<5 mL). The   procedure was not difficult. The patient tolerated the procedure well.   There were no apparent adverse events.     Events  Procedure Events   Event Event Time   ENDO SCOPE IN TIME 8/2/2024  2:31 PM   ENDO SCOPE OUT TIME 8/2/2024  2:43 PM     Specimens  No specimens collected    Procedure Location  [place] [id number]  [address] [facility] [address]  [phone]    Referring Provider  Isaura Merlos MD    Procedure Provider  MD Avni Richter MD Ashley Faulx, MD     No results found for this or any previous visit from the past 1095 days.     Encounter Date: 07/21/24   Electrocardiogram, 12-lead PRN ACS symptoms   Result Value    Ventricular Rate 88    Atrial Rate 88    IA Interval 158    QRS Duration 118    QT Interval 378    QTC Calculation(Bazett) 457    P Axis 71    R Axis 20    T Axis 55    QRS Count 14    Q Onset 229    P Onset 150    P Offset 211    T Offset 418    QTC Fredericia 429    Narrative    Normal sinus rhythm  Nonspecific intraventricular conduction delay  Nonspecific ST and T wave abnormality  Abnormal ECG  When compared with ECG of 22-JUL-2024 00:22,  Sinus rhythm has replaced Junctional rhythm  ST no longer depressed in Inferior leads  Non-specific change in ST segment in Anterior leads  ST less depressed in Lateral leads  Confirmed by Aris Davila ([id number]) on 7/23/2024 5:22:40 PM          Assessment and Plan     Newly diagnosed diabetes mellitus in the setting of HHS: Blood glucose is doing well without needs for sliding scale  -Continue corrective sliding scale insulin  -Continue 8 units of Lantus.  Titrate per sliding scale needs.  May need to resume mealtime lispro as tube feed is titrated up and switch over to bolus feeds.  At this time would not  restart that      V. tach arrest:  -Follow-up cardiology recommendations.  Planning for ischemic evaluation after PEG tube.  Will continue to work with family on goals of care.    -Continue aspirin and statin when we have safe oral means  -Keep K greater than 4 and mag greater than 2  -CT coronaries on 8/5.  Patient n.p.o. at midnight     GI:  -Continue bowel care  -Follow-up any further GI recommendations.  Assistance with PEG tube greatly appreciated  -Continue to titrate to goal tube feed rate and then likely convert over to bolus feeds.  Will need to hold for testing tomorrow starting at midnight tonight.     CNS:  -Continue work with speech therapy.     DVT prophylaxis     Continue to work with family on goals of care     Physical therapy/Occupational Therapy when appropriate     Dwain Landon MD      Of note the above was done with Dragon dictation system.  Note was proofread to minimize errors.

## 2024-08-04 NOTE — CARE PLAN
The patient's goals for the shift include  Get music    The clinical goals for the shift include pt will be free from injury/falls for duration of shift.     Over the shift, the patient made progress towards all goals.

## 2024-08-04 NOTE — PROGRESS NOTES
Progress Note, 8/4/2024:    History of Present Illness  Caryl Tucker is a 53 y.o. female currently on day 14 of admission (after 4d 3h in ICU) for HHS and acute hypercapnic respiratory failure requiring intubation c/b Vfib cardiac arrest.    Interval History  Increased TF to goal rate 50, tolerated well.     Subjective  No acute events overnight. Patient has no complaints. Feeling well.     Objective    Vitals  Visit Vitals  /87   Pulse 84   Temp 36.3 °C (97.3 °F)   Resp 16        I/O    Intake/Output Summary (Last 24 hours) at 8/4/2024 1408  Last data filed at 8/4/2024 0346  Gross per 24 hour   Intake 1561 ml   Output 0 ml   Net 1561 ml        Physical Exam  Physical Exam    Labs  CBC:  Results from last 7 days   Lab Units 08/04/24  0733 08/03/24  0812 08/02/24  0841   WBC AUTO x10*3/uL 7.7 7.4 8.3   HEMOGLOBIN g/dL 10.9* 10.8* 10.9*   PLATELETS AUTO x10*3/uL 283 256 253     BMP:  Results from last 7 days   Lab Units 08/04/24  0733 08/03/24  0812 08/02/24  0842   SODIUM mmol/L 138 138 140   POTASSIUM mmol/L 3.9 3.4* 3.9   CHLORIDE mmol/L 99 98 100   CO2 mmol/L 31 33* 30   BUN mg/dL 6 4* 5*   CREATININE mg/dL 0.81 0.71 0.76   GLUCOSE mg/dL 145* 110* 116*     LFT:  Results from last 7 days   Lab Units 08/04/24  0733 08/03/24  0812 08/02/24  0842   AST U/L 22 32 40*   ALT U/L 18 23 29   ALK PHOS U/L 99 99 97   BILIRUBIN TOTAL mg/dL 0.4 0.5 0.5   BILIRUBIN DIRECT mg/dL 0.0 0.0 0.1     Cardiac:      Coag:  Results from last 7 days   Lab Units 07/29/24  0124   PROTIME seconds 11.5   APTT seconds 80*   INR  1.0     ABG:     .  UA:           Last EKG  Encounter Date: 07/21/24   Electrocardiogram, 12-lead PRN ACS symptoms   Result Value    Ventricular Rate 88    Atrial Rate 88    CT Interval 158    QRS Duration 118    QT Interval 378    QTC Calculation(Bazett) 457    P Axis 71    R Axis 20    T Axis 55    QRS Count 14    Q Onset 229    P Onset 150    P Offset 211    T Offset 418    QTC Fredericia 429    Narrative     Normal sinus rhythm  Nonspecific intraventricular conduction delay  Nonspecific ST and T wave abnormality  Abnormal ECG  When compared with ECG of 22-JUL-2024 00:22,  Sinus rhythm has replaced Junctional rhythm  ST no longer depressed in Inferior leads  Non-specific change in ST segment in Anterior leads  ST less depressed in Lateral leads  Confirmed by Aris Davila ([id number]) on 7/23/2024 5:22:40 PM        Imaging    FL modified barium swallow study    Result Date: 7/30/2024  Impression: Modified Barium Swallow Study completed. Verbal consent obtained. Pt awake/alert and conversant/cooperative throughout study.   Severe oropharyngeal dysphagia. Trials of thin liquids via tsp/straw, mildly (nectar) thick liquids via straw, honey (moderately) thick liquids via straw, and purees were given. Lingual pumping, reduced bolus control, and piecemeal swallow across all consistencies w/ poor oral clearance.   Posterior loss to the level of the pyriforms, delayed pharyngeal swallow initiation, and delayed epiglottic inversion/laryngeal vestibular closure resulted in moderate amount aspiration of single sips thin, mildly (nectar), and moderately (honey) thick liquids via straw. Pt inconsistently sensed aspiration (i.e., SILENT w/ mildly thick liquids) and attempted to clear w/ cough w/ minimal success. Trialed moderately (honey) thick liquids via chin tuck to aid in airway invasion w/ initial success; however, aspiration reduplicated after the swallow 2/2 residue from pyriforms spilling over the posterior laryngeal wall and below the level of the TVF. Improved timeliness of pharyngeal swallow w/ purees e/b no penetration/aspiration w/ x7 trials.   Pt not appropriate for initiation of complete oral diet given aspiration of all liquid consistencies. Would recommend NPO w/ frequent aggressive oral care + question need for alternate means nutrition/hydration. 5-7 ice chips/hr allowed for pleasure, safe swallow stimulation, and after  oral care to prevent buildup of bacteria within the oropharynx.   Of note, pt's home facility reports pt tolerating Pureed (Level 4) Solids & Pudding Thick Liquids at baseline/prior to admission; confirmed via MBSS that pt consistently demonstrates safety/efficiency w/ pureed consistency. However, modified diets/thickened liquids can encourage/place pt at risk of malnutrition/dehydration; would deem purees appropriate for pleasure and/or bolus-driven therapy only.   Provided extensive education to pt re: results of MBSS, recommendations, and options for dysphagia POC. Pt expressed significant disinterest in dobhoff or PEG tube replacement + is content w/ baseline diet of pureed solids & pudding thick liquids. Therefore, question need for GOC discussions re: assuming the risk of aspiration/malnutrition/dehydration w/ complete oral diet vs pleasure feeds w/ supplemental nutrition. MD aware.   Ro: Thin: 7 - Material enters airway, passes below the folds, not ejected despite effort. Nectar: 8 - Material enters airway, passes below the folds, no effort to eject (no cough). Honey: 8 - Material enters airway, passes below the folds, no effort to eject (no cough). Puree: 1 - Material does not enter airway.   Recommendations: NPO with frequent aggressive oral care + purees for pleasure and/or bolus-driven therapy only 5-7 ice chips/hr allowed for pleasure, safe swallow stimulation, and after oral care to prevent buildup of bacteria within the oropharynx Consider alternate means nutrition/hydration   vs GOC discussions re: assuming the risk of aspiration/malnutrition/dehydration w/ complete oral diet given pt's tolerance/content w/ baseline diet of Pureed (Level 4) Solids & Pudding Thick Liquids   Goal: Pt will tolerate least restrictive diet with no overt clinical s/s aspiration 100% of the time. Start Date: 7/30/24 Expected Time Frame to Meet Goal: 2-3 weeks     Plan: SLP Services Indicated: Yes Frequency: pending  [facility] Discussed POC with patient SLP - OK to Discharge   Pain: 0-10 0 = No pain.   Inpatient Education: Extensive education provided to patient regarding current swallow function, recommendations/results, and POC.   Consultations/Referrals/Coordination of Services: [facility]   Speech Therapy section of this report signed by Mena Jett on 7/30/2024 at 4:27 pm.   RADIOLOGY FINDINGS: No evidence of degenerative osseous changes to the cervical vertebrae.   Radiology section of this report signed by Dr. Huitron  .   IMPRESSION: 1. Please refer to detailed swallow study evaluation by speech pathologist.   2. No radiographic evidence of acute osseous abnormalities within limits of the current examination. I personally reviewed the images/study and I agree with the findings as stated by Fritz Mackenzie DO PGY-2. This study was interpreted at [place], [place].   MACRO: None   Signed by: Akhil Huitron 7/30/2024 6:15 PM Dictation workstation:   [id number]    XR chest 1 view    Result Date: 7/26/2024  1. Improved but residual bibasilar atelectasis, left more than right. No new airspace consolidation. 2. Medical devices as above.   I personally reviewed the images/study and I agree with the findings as stated by Wong Sexton DO, PGY-3. This study was interpreted at [place], [place].   Signed by: Vamsi Allred 7/26/2024 7:27 AM Dictation workstation:   [id number]      Inpatient Medications    Scheduled medications  aspirin, 81 mg, oral, Daily  atorvastatin, 40 mg, oral, Nightly  insulin glargine, 8 Units, subcutaneous, Nightly  insulin lispro, 0-10 Units, subcutaneous, q6h  [Held by provider] insulin lispro, 5 Units, subcutaneous, TID  magnesium sulfate, 2 g, intravenous, Once  metoprolol, 5 mg, intravenous, Once  metoprolol tartrate, 100 mg, oral, Once  nitroglycerin, 0.8 mg, sublingual, Once  polyethylene glycol, 17 g, oral,  Daily  potassium chloride, 20 mEq, g-tube, Once  senna, 5 mL, oral, Nightly      Continuous medications  heparin, 0-4,000 Units/hr, Last Rate: 1,400 Units/hr (08/04/24 0346)      PRN medications  PRN medications: dextrose, droperidol, glucagon, glucagon, heparin, LORazepam, menthol-zinc oxide, metoprolol, metoprolol, metoprolol, metoprolol, metoprolol tartrate, ondansetron, ondansetron, oxygen      Assessment & Plan  Caryl Tucker is a 53 y.o. female on day 14 of admission, PMH of CVA c/b left hemiparesis and dysphagia (2016) s/p PEG placement (2016, replaced 5 times, removed unknown date), HTN, PE/DVT on Eliquis, who was admitted to the MICU on 07/21/24 for HHS and acute hypercapnic respiratory failure requiring intubation. Vfib arrest occurred 7/22- ROSC achieved. Patient currently on the floor and clinically stable. Notably, patient has not been tolerating PO intake even in pureed foods, SLT recommended NPO with alternate means nutrition. PEG placed, patient receiving enteral feeds.    DAILY UPDATES:  - At goal rate of 50cc/hr TF. If well tolerated, plan to transition to bolus feeding tomorrow after CT  - NPO at midnight for CCTA    Principal Problem:  #Dysphagia  #History of Stroke  #PEG tube   - Patient was seen by SLT, recommendation made for NPO w/ alternate means nutrition/hydration + purees/pudding for pleasure and/or bolus-driven therapy.  - S/p PEG tube placement.  - Patient receiving continuous enteral feeds.    #Vfib #Cardiac arrest   #Elevated troponin  - Etiology of arrest likely electrolyte disturbances in the setting of HHS, although cannot rule out ischemic cause completely.  Plan:  Coronary CTA planned for Monday.   Monitor K (goal >4) and Mg (goal >2)  Holding heparin for PEG tube placement. Resume per GI recs.     #Transaminitis (Resolved)  Monitor LFTs     #ZULMA (Resolved)  Monitor RFPs     #COVID19 (Resolved)  Patient off precautions  Monitor CBC w/ differential     #Newly diagnosed diabetes  mellitus   #HHS  C/w Lantus 8mg + SSI  Reassess given initiation of enteral feeds    Fluids: PRN  Electrolytes: PRN  Nutrition: NPO Diet; Effective midnight  Enteral feeding with NPO Glucerna 1.5; PEG (percutaneous endoscopic gastric); 50; 100; Water; Sterile water; Every 4 hours  Access: PRN  DVT prophylaxis: Heparin subq    Code Status: Full Code (Confirmed on admission)   Emergency Contact / NOK: Extended Emergency Contact Information  Primary Emergency Contact: Radha Osullivan  Home Phone: [phone]  Work Phone: [phone]  Relation: Power of   Secondary Emergency Contact: Jade Osullivan  Mobile Phone: [phone]  Relation: Relative         8/4/2024

## 2024-08-04 NOTE — PROGRESS NOTES
Caryl Tucker is a 53 y.o. female on day 14 of admission presenting with Acute on chronic respiratory failure with hypercapnia (Multi).  Transitional Care Coordination Progress Note:  Patient discussed during interdisciplinary rounds.   Team members present: MD and TCC  Plan per Medical/Surgical team: Patient possible discharge tomorrow evening depending on CT results.  Payor: [employer]  Discharge disposition: Home  Potential Barriers: None  ADOD: 08/05/24    NADIA VAUGHN RN

## 2024-08-05 ENCOUNTER — APPOINTMENT (OUTPATIENT)
Dept: RADIOLOGY | Facility: HOSPITAL | Age: 54
DRG: 208 | End: 2024-08-05
Payer: MEDICARE

## 2024-08-05 LAB
ALBUMIN SERPL BCP-MCNC: 3 G/DL (ref 3.4–5)
ALP SERPL-CCNC: 101 U/L (ref 33–110)
ALT SERPL W P-5'-P-CCNC: 14 U/L (ref 7–45)
ANION GAP SERPL CALC-SCNC: 12 MMOL/L (ref 10–20)
AST SERPL W P-5'-P-CCNC: 18 U/L (ref 9–39)
BASOPHILS # BLD AUTO: 0.03 X10*3/UL (ref 0–0.1)
BASOPHILS NFR BLD AUTO: 0.4 %
BILIRUB DIRECT SERPL-MCNC: 0.1 MG/DL (ref 0–0.3)
BILIRUB SERPL-MCNC: 0.4 MG/DL (ref 0–1.2)
BUN SERPL-MCNC: 6 MG/DL (ref 6–23)
CALCIUM SERPL-MCNC: 8.9 MG/DL (ref 8.6–10.6)
CHLORIDE SERPL-SCNC: 97 MMOL/L (ref 98–107)
CO2 SERPL-SCNC: 30 MMOL/L (ref 21–32)
CREAT SERPL-MCNC: 0.72 MG/DL (ref 0.5–1.05)
CRP SERPL-MCNC: 8.51 MG/DL
EGFRCR SERPLBLD CKD-EPI 2021: >90 ML/MIN/1.73M*2
EOSINOPHIL # BLD AUTO: 0.26 X10*3/UL (ref 0–0.7)
EOSINOPHIL NFR BLD AUTO: 3.2 %
ERYTHROCYTE [DISTWIDTH] IN BLOOD BY AUTOMATED COUNT: 18.3 % (ref 11.5–14.5)
GLUCOSE BLD MANUAL STRIP-MCNC: 125 MG/DL (ref 74–99)
GLUCOSE BLD MANUAL STRIP-MCNC: 129 MG/DL (ref 74–99)
GLUCOSE BLD MANUAL STRIP-MCNC: 131 MG/DL (ref 74–99)
GLUCOSE BLD MANUAL STRIP-MCNC: 144 MG/DL (ref 74–99)
GLUCOSE BLD MANUAL STRIP-MCNC: 145 MG/DL (ref 74–99)
GLUCOSE SERPL-MCNC: 131 MG/DL (ref 74–99)
HCT VFR BLD AUTO: 34.1 % (ref 36–46)
HGB BLD-MCNC: 10.6 G/DL (ref 12–16)
IMM GRANULOCYTES # BLD AUTO: 0.11 X10*3/UL (ref 0–0.7)
IMM GRANULOCYTES NFR BLD AUTO: 1.3 % (ref 0–0.9)
LYMPHOCYTES # BLD AUTO: 1.87 X10*3/UL (ref 1.2–4.8)
LYMPHOCYTES NFR BLD AUTO: 22.7 %
MAGNESIUM SERPL-MCNC: 1.85 MG/DL (ref 1.6–2.4)
MCH RBC QN AUTO: 26.9 PG (ref 26–34)
MCHC RBC AUTO-ENTMCNC: 31.1 G/DL (ref 32–36)
MCV RBC AUTO: 87 FL (ref 80–100)
MONOCYTES # BLD AUTO: 0.55 X10*3/UL (ref 0.1–1)
MONOCYTES NFR BLD AUTO: 6.7 %
NEUTROPHILS # BLD AUTO: 5.41 X10*3/UL (ref 1.2–7.7)
NEUTROPHILS NFR BLD AUTO: 65.7 %
NRBC BLD-RTO: 0.6 /100 WBCS (ref 0–0)
PHOSPHATE SERPL-MCNC: 3.8 MG/DL (ref 2.5–4.9)
PLATELET # BLD AUTO: 289 X10*3/UL (ref 150–450)
POTASSIUM SERPL-SCNC: 4.2 MMOL/L (ref 3.5–5.3)
PROT SERPL-MCNC: 6.9 G/DL (ref 6.4–8.2)
RBC # BLD AUTO: 3.94 X10*6/UL (ref 4–5.2)
SODIUM SERPL-SCNC: 135 MMOL/L (ref 136–145)
UFH PPP CHRO-ACNC: 0.6 IU/ML
WBC # BLD AUTO: 8.2 X10*3/UL (ref 4.4–11.3)

## 2024-08-05 PROCEDURE — 99222 1ST HOSP IP/OBS MODERATE 55: CPT | Performed by: STUDENT IN AN ORGANIZED HEALTH CARE EDUCATION/TRAINING PROGRAM

## 2024-08-05 PROCEDURE — 2550000001 HC RX 255 CONTRASTS: Performed by: INTERNAL MEDICINE

## 2024-08-05 PROCEDURE — 36415 COLL VENOUS BLD VENIPUNCTURE: CPT

## 2024-08-05 PROCEDURE — 75574 CT ANGIO HRT W/3D IMAGE: CPT | Performed by: INTERNAL MEDICINE

## 2024-08-05 PROCEDURE — 99233 SBSQ HOSP IP/OBS HIGH 50: CPT | Performed by: NURSE PRACTITIONER

## 2024-08-05 PROCEDURE — 86140 C-REACTIVE PROTEIN: CPT

## 2024-08-05 PROCEDURE — 82248 BILIRUBIN DIRECT: CPT

## 2024-08-05 PROCEDURE — 99232 SBSQ HOSP IP/OBS MODERATE 35: CPT

## 2024-08-05 PROCEDURE — 83735 ASSAY OF MAGNESIUM: CPT

## 2024-08-05 PROCEDURE — 75574 CT ANGIO HRT W/3D IMAGE: CPT

## 2024-08-05 PROCEDURE — 2500000002 HC RX 250 W HCPCS SELF ADMINISTERED DRUGS (ALT 637 FOR MEDICARE OP, ALT 636 FOR OP/ED)

## 2024-08-05 PROCEDURE — 80053 COMPREHEN METABOLIC PANEL: CPT

## 2024-08-05 PROCEDURE — 84100 ASSAY OF PHOSPHORUS: CPT

## 2024-08-05 PROCEDURE — 2500000004 HC RX 250 GENERAL PHARMACY W/ HCPCS (ALT 636 FOR OP/ED)

## 2024-08-05 PROCEDURE — 82947 ASSAY GLUCOSE BLOOD QUANT: CPT

## 2024-08-05 PROCEDURE — 2500000001 HC RX 250 WO HCPCS SELF ADMINISTERED DRUGS (ALT 637 FOR MEDICARE OP)

## 2024-08-05 PROCEDURE — 1100000001 HC PRIVATE ROOM DAILY

## 2024-08-05 PROCEDURE — 85520 HEPARIN ASSAY: CPT

## 2024-08-05 PROCEDURE — 85025 COMPLETE CBC W/AUTO DIFF WBC: CPT

## 2024-08-05 PROCEDURE — 2500000005 HC RX 250 GENERAL PHARMACY W/O HCPCS

## 2024-08-05 ASSESSMENT — PAIN SCALES - GENERAL: PAINLEVEL_OUTOF10: 0 - NO PAIN

## 2024-08-05 ASSESSMENT — PAIN - FUNCTIONAL ASSESSMENT: PAIN_FUNCTIONAL_ASSESSMENT: 0-10

## 2024-08-05 NOTE — CONSULTS
Inpatient consult to Electrophysiology  Consult performed by: Allan Gillespie MD  Consult ordered by: Dwain Landon MD    Inpatient Electrophysiology Consult Note    Reason for consult: ICD     HPI:   Caryl Tucker 53F PMHx CVA 2016 c/b L hemiparesis and dysphagia s/p PEG (now removed), HTN, PE/DVT on eliquis. Admitted for encephalopathy 2/2 HHS/DKA course c/b VF arrest, EP consulted for ICD for secondary prevention.     VF arrest happened 7/21 11pm, she was admitted to the MICU with HHS/DKA (hypokalemia, hyperglycemia, acidosis). Code lastest 46 minutes with ROSC in between, she received defibrillation x3 for VF.  Prior to arrest was noted to have NSVT and bigeminy, Cardiology consult team felt this VF was most likely 2/2 severe electrolyte derangements iso DKA (K was 3.2, likely much lower).  Her troponin peaked at 955, TTE with preserved LVEF, no WMA.     Patient underwent CTA cors which showed no e/o atherosclerotic disease and Ca score of zero. She is now pending cMRI and GOC was discussed with family and POA and they elected to proceed with workup and ICD.     All system reviewed and negative unless mentioned above.     Cardiac studies:   EKG 7/22/24 - SR, IVCD    TTE 7/22/24:   1. The left ventricular systolic function is normal, with a visually estimated ejection fraction of 70-75%.   2. Spectral Doppler shows an impaired relaxation pattern of left ventricular diastolic filling.   3. There is no evidence of left ventricular hypertrophy.   4. There is normal right ventricular global systolic function.      Current Facility-Administered Medications:     aspirin chewable tablet 81 mg, 81 mg, oral, Daily, Chase Woo MD, 81 mg at 08/05/24 1258    atorvastatin (Lipitor) tablet 40 mg, 40 mg, oral, Nightly, Chase Woo MD, 40 mg at 08/04/24 2118    dextrose 50 % injection 50 mL, 50 mL, intravenous, PRN, Chase Woo MD    droperidol (Inapsine) injection 0.625 mg, 0.625 mg, intravenous, Once PRN,  Maria Luisa Lilly MD    glucagon (Glucagen) injection 1 mg, 1 mg, intramuscular, q15 min PRN, Chase Woo MD    glucagon (Glucagen) injection 1 mg, 1 mg, intramuscular, q15 min PRN, Chase Woo MD    heparin 25,000 Units in dextrose 5% 250 mL (100 Units/mL) infusion (premix), 0-4,000 Units/hr, intravenous, Continuous, Juan Abdullahi MD, Last Rate: 14 mL/hr at 08/04/24 2118, 1,400 Units/hr at 08/04/24 2118    heparin bolus from bag 2,000-4,000 Units, 2,000-4,000 Units, intravenous, q4h PRN, Chase Woo MD, 2,000 Units at 08/03/24 1942    insulin glargine (Lantus) injection 8 Units, 8 Units, subcutaneous, Nightly, Shanna San MD, 8 Units at 08/04/24 2129    insulin lispro (HumaLOG) injection 0-10 Units, 0-10 Units, subcutaneous, q6h, Chase Woo MD, 2 Units at 07/28/24 2151    [Held by provider] insulin lispro (HumaLOG) injection 5 Units, 5 Units, subcutaneous, TID, Juan Abdullahi MD, 5 Units at 07/28/24 2218    LORazepam (Ativan) injection 0.5 mg, 0.5 mg, intravenous, q5 min PRN, Juan Abdullahi MD    menthol-zinc oxide (Calmoseptine - Risamine) 0.44-20.6 % ointment 1 Application, 1 Application, Topical, 4x daily PRN, Juan Abdullahi MD    metoprolol tartrate (Lopressor) injection 5 mg, 5 mg, intravenous, Once PRN, Juan Abdullahi MD    metoprolol tartrate (Lopressor) injection 5 mg, 5 mg, intravenous, Once PRN, Juan Abdullahi MD    metoprolol tartrate (Lopressor) injection 5 mg, 5 mg, intravenous, Once PRN, Juan Abdullahi MD    ondansetron (Zofran) injection 4 mg, 4 mg, intravenous, q4h PRN, Chase Woo MD    ondansetron (Zofran) injection 4 mg, 4 mg, intravenous, Once PRN, Maria Luisa Lilly MD    oxygen (O2) therapy, , inhalation, Continuous PRN - O2/gases, Maria Luisa Lilly MD    polyethylene glycol (Glycolax, Miralax) packet 17 g, 17 g, oral, Daily, Chase Woo MD, 17 g at 08/04/24 1003    senna (Senokot) 8.8 mg/5 mL syrup 5  mL, 5 mL, oral, Nightly, Chase Woo MD, 5 mL at 08/04/24 2118    Objective:    Vitals:    08/05/24 1148   BP: 141/77   Pulse: 62   Resp: 18   Temp: 36.6 °C (97.9 °F)   SpO2: 95%       Exam:  General - well appearing, seems slightly confused  Neck - no JVD   Chest - respirations normal  Cardiac - S1, S2 regular  Lower ext - warm    Labs/Imaging:     Results from last 72 hours   Lab Units 08/05/24  0821   SODIUM mmol/L 135*   POTASSIUM mmol/L 4.2   CO2 mmol/L 30   BUN mg/dL 6   CREATININE mg/dL 0.72   MAGNESIUM mg/dL 1.85   PHOSPHORUS mg/dL 3.8      Results from last 72 hours   Lab Units 08/05/24  0821   WBC AUTO x10*3/uL 8.2   HEMOGLOBIN g/dL 10.6*   PLATELETS AUTO x10*3/uL 289        Assessment and Plan:   53F PMHx CVA 2016 c/b L hemiparesis and dysphagia s/p PEG (now removed), HTN, PE/DVT on eliquis. Admitted for encephalopathy course c/b VF arrest, EP consulted for ICD for secondary prevention.     #VF Arrest   Patient with VF event likely triggered by severe electrolyte and metabollic derangements iso DKA/HHS with acidosis hyperglycemia and hypokalemia. She has cor CTA with calcium score of zero, TTE with preserved LVEF, no WMA. She is currently pending cMRI.   - If cMRI without any nidus for VF, no indication for ICD given VF triggered by severe metabolic derangements  - Recommend d/c with 14d event monitor given paroxysmal atach seen on tele, place order for event monitor on day of discharge  - Please arrange general cardiology followup for patient prior to discharge     Recommendations are preliminary until note is co-signed by an attending.     Juliet Gillespie  Cardiology Fellow   PGY-5

## 2024-08-05 NOTE — PROGRESS NOTES
"Subjective   CCTA without evidence of CAD  Calcium score zero  SR 80s  -150s  Denies CP/ SOB/dizziness, palpitations        Objective   Visit Vitals  /77   Pulse 62   Temp 36.6 °C (97.9 °F)   Resp 18   Ht 1.803 m (5' 11\")   Wt 104 kg (230 lb 6.1 oz)   SpO2 95%   BMI 32.13 kg/m²   Smoking Status Never   BSA 2.29 m²      Physical Exam  General: awake, alert, no acute distress  HEENT: no scleral icterus, room air   CV: RRR  Resp: CTA anteriorly   Extremities: No edema    Cardiographics    8/5 CCTA  IMPRESSION:  1. Normal coronary anatomy without evidence of atherosclerotic  changes or stenotic disease.  2. Left dominant coronary system.  3. Coronary calcium score 0.  4. Dilated main pulmonary artery (3.3 cm).      Transthoracic Echo (TTE) Limited 7/22:  1. The left ventricular systolic function is normal, with a visually estimated ejection fraction of 65-70%.  2. There is moderate left ventricular hypertrophy.  3. There is normal right ventricular global systolic function.  4. The pulmonary artery is not well visualized.  5. On call limited fellow echo.       Lab Review   Lab Results   Component Value Date     (L) 08/05/2024    K 4.2 08/05/2024    CL 97 (L) 08/05/2024    CO2 30 08/05/2024    BUN 6 08/05/2024    CREATININE 0.72 08/05/2024    GLUCOSE 131 (H) 08/05/2024    CALCIUM 8.9 08/05/2024     Lab Results   Component Value Date    WBC 8.2 08/05/2024    HGB 10.6 (L) 08/05/2024    HCT 34.1 (L) 08/05/2024    MCV 87 08/05/2024     08/05/2024        Assessment/Plan   53F w/ CVA c/b left hemiparesis and dysphagia (2016) s/p PEG placement (2016, replaced 5 times, removed unknown date), HTN, PE/DVT on Eliquis who was sent to the ED by [facility] for evaluation of encephalopathy. She was found to have an ZULMA with hyperkalemia, hyperglycemia, glucose greater than 1400, and the CT concerning for proctitis, she was treated with antibiotics, started with insulin drip, required Levophed " briefly, and was intubated for airway protection.     Around 11:52 PM, patient was noted to go into V-fib arrest, per telemetry review patient had increasing episodes of NSVT and bigeminy prior to this arrest. The patient required 3 shocks. She received epi x 3, amiodarone 300mg, calcium x 1 , HCO3 x 1 during the code. She  intermittently had ROSC in between episodes.      8/1 Primary team had GOC discussion with patient and her POA (Aunt). They wish to proceed with ischemic eval/CMR/ICD  as indicated    Impression   #VF arrest iso episodes of NSVT and electolyte imbalances   #Elevated troponin - peaked at 955    Please order MRI Cardiac w/wo contrast for Morph/Funct and Valve Dz to further assess etiology  - Please place order for EP consult to consider ICD placement for secondary prevention  - Keep K+>4 and Mg+ >2   - Continue Heparin bridge to Eliquis (hx of DVT/PE) until determined all procedures complete    Cardiology will follow      MAK Cole-CNP  Cardiology Consults    Please call with any questions  Pager [phone] M-F 7a-6p; Saturday 7a-2p  Pager [phone] all other times

## 2024-08-05 NOTE — PROGRESS NOTES
Transitional Care Coordination Progress Note:  Patient discussed during interdisciplinary rounds.  Team members present: MD, LEA  Plan per Medical/Surgical team: Pt s/p PEG tube placement and started on continuous tube feeding (team plans to transition to bolus feeds prior to discharge), plan for MRI today per Cards recommendation.  Payer: [employer], [employer]  Status: Inpatient  Discharge disposition: [facility]  Potential Barriers: none  ADOD: 8/6  Updates sent to [facility] via [employer] and they were updated on anticipated discharge date. Care coordinator will continue to follow for discharge planning needs.     Sugar Samuels RN  Transitional Care Coordinator/TCC  [id number]

## 2024-08-05 NOTE — PROGRESS NOTES
Progress Note, 8/5/2024:    History of Present Illness  Caryl Tucker is a 53 y.o. female currently on day 15 of admission (after 4d 3h in ICU) for HHS and acute hypercapnic respiratory failure requiring intubation c/b Vfib cardiac arrest.    Interval History  Tube feeds currently at 50 ml/hr, tolerated well. Planning to transition to bolus after CCTA.    Subjective  No acute events overnight.     She does complain of distal LUE swelling; and took off her IV on that side.  No pain per patient, but extremity is warm to the touch.    Objective    Vitals  Visit Vitals  /73   Pulse 65   Temp 36.7 °C (98.1 °F)   Resp 18        I/O    Intake/Output Summary (Last 24 hours) at 8/5/2024 0803  Last data filed at 8/5/2024 0654  Gross per 24 hour   Intake 1206 ml   Output 800 ml   Net 406 ml        Physical Exam  Physical Exam    Labs  CBC:  Results from last 7 days   Lab Units 08/04/24  0733 08/03/24  0812 08/02/24  0841   WBC AUTO x10*3/uL 7.7 7.4 8.3   HEMOGLOBIN g/dL 10.9* 10.8* 10.9*   PLATELETS AUTO x10*3/uL 283 256 253     BMP:  Results from last 7 days   Lab Units 08/04/24  0733 08/03/24  0812 08/02/24  0842   SODIUM mmol/L 138 138 140   POTASSIUM mmol/L 3.9 3.4* 3.9   CHLORIDE mmol/L 99 98 100   CO2 mmol/L 31 33* 30   BUN mg/dL 6 4* 5*   CREATININE mg/dL 0.81 0.71 0.76   GLUCOSE mg/dL 145* 110* 116*     LFT:  Results from last 7 days   Lab Units 08/04/24  0733 08/03/24  0812 08/02/24  0842   AST U/L 22 32 40*   ALT U/L 18 23 29   ALK PHOS U/L 99 99 97   BILIRUBIN TOTAL mg/dL 0.4 0.5 0.5   BILIRUBIN DIRECT mg/dL 0.0 0.0 0.1       Last EKG  Encounter Date: 07/21/24   Electrocardiogram, 12-lead PRN ACS symptoms   Result Value    Ventricular Rate 88    Atrial Rate 88    [place] Interval 158    QRS Duration 118    QT Interval 378    QTC Calculation(Bazett) 457    P Axis 71    R Axis 20    T Axis 55    QRS Count 14    Q Onset 229    P Onset 150    P Offset 211    T Offset 418    QTC Fredericia 429    Narrative    Normal  sinus rhythm  Nonspecific intraventricular conduction delay  Nonspecific ST and T wave abnormality  Abnormal ECG  When compared with ECG of 22-JUL-2024 00:22,  Sinus rhythm has replaced Junctional rhythm  ST no longer depressed in Inferior leads  Non-specific change in ST segment in Anterior leads  ST less depressed in Lateral leads  Confirmed by Aris Davila ([id number]) on 7/23/2024 5:22:40 PM        Imaging    FL modified barium swallow study    Result Date: 7/30/2024  1. Please refer to detailed swallow study evaluation by speech pathologist.   2. No radiographic evidence of acute osseous abnormalities within limits of the current examination. I personally reviewed the images/study and I agree with the findings as stated by Fritz Mackenzie DO PGY-2. This study was interpreted at [place], [place].   MACRO: None   Signed by: Akhil Huitron 7/30/2024 6:15 PM Dictation workstation:   [id number]    XR chest 1 view    Result Date: 7/26/2024  1. Improved but residual bibasilar atelectasis, left more than right. No new airspace consolidation. 2. Medical devices as above.   I personally reviewed the images/study and I agree with the findings as stated by Wong Sexton DO, PGY-3. This study was interpreted at [place], [place].   Signed by: Vamsi Allred 7/26/2024 7:27 AM Dictation workstation:   [id number]      Inpatient Medications    Scheduled medications  aspirin, 81 mg, oral, Daily  atorvastatin, 40 mg, oral, Nightly  insulin glargine, 8 Units, subcutaneous, Nightly  insulin lispro, 0-10 Units, subcutaneous, q6h  [Held by provider] insulin lispro, 5 Units, subcutaneous, TID  nitroglycerin, 0.8 mg, sublingual, Once  polyethylene glycol, 17 g, oral, Daily  senna, 5 mL, oral, Nightly      Continuous medications  heparin, 0-4,000 Units/hr, Last Rate: 1,400 Units/hr (08/04/24 2118)      PRN medications  PRN medications: dextrose,  droperidol, glucagon, glucagon, heparin, LORazepam, menthol-zinc oxide, metoprolol, metoprolol, metoprolol, metoprolol, ondansetron, ondansetron, oxygen      Assessment & Plan  Caryl Tucker is a 53 y.o. female on day 15 of admission, PMH of CVA c/b left hemiparesis and dysphagia (2016) s/p PEG placement (2016, replaced 5 times, removed unknown date), HTN, PE/DVT on Eliquis, who was admitted to the MICU on 07/21/24 for HHS and acute hypercapnic respiratory failure requiring intubation. Vfib arrest occurred 7/22- ROSC achieved. Patient currently on the floor and clinically stable. Notably, patient has not been tolerating PO intake even in pureed foods, SLT recommended NPO with alternate means nutrition. PEG placed, patient receiving enteral feeds.    DAILY UPDATES:  - At goal rate of 50cc/hr TF.   - Transition to bolus feeding after CCT    Principal Problem:  #Dysphagia  #History of Stroke  #PEG tube   - Patient was seen by SLT, recommendation made for NPO w/ alternate means nutrition/hydration + purees/pudding for pleasure and/or bolus-driven therapy.  - S/p PEG tube placement.  - Patient tolerating 50 ml/hr of continuous tube feeds.  Switch to bolus feeds after CCTA.    #Vfib #Cardiac arrest   #Elevated troponin  - Etiology of arrest likely electrolyte disturbances in the setting of HHS, although cannot rule out ischemic cause completely.  Plan:  Coronary CTA planned for today.   Follow up regarding plan for ICD.  Monitor K (goal >4) and Mg (goal >2)    #LUE Swelling [NEW]  - Patient took off her IV on that side. Arm warm to the touch and with non-pitting edema.  - C/f cellulitis.  - WBC WNL, CRP 8.5  Follow up on labs   Monitor CBC  Consider Abx if uptrend    #Transaminitis (Resolved)  Monitor LFTs     #ZULMA (Resolved)  Monitor RFPs     #COVID19 (Resolved)  Patient off precautions  Monitor CBC w/ differential     #Newly diagnosed diabetes mellitus   #HHS  C/w Lantus 8mg + SSI  Reassess given initiation of enteral  feeds    Fluids: PRN  Electrolytes: PRN  Nutrition: NPO Diet; Effective midnight  Access: PRN  DVT prophylaxis: Heparin subq    Code Status: Full Code (Confirmed on admission)   Emergency Contact / NOK: Extended Emergency Contact Information  Primary Emergency Contact: Radha Osullivan  Home Phone: [phone]  Work Phone: [phone]  Relation: Power of   Secondary Emergency Contact: Jade Osullivan  Mobile Phone: [phone]  Relation: Relative         8/5/2024

## 2024-08-06 ENCOUNTER — APPOINTMENT (OUTPATIENT)
Dept: RADIOLOGY | Facility: HOSPITAL | Age: 54
DRG: 208 | End: 2024-08-06
Payer: MEDICARE

## 2024-08-06 ENCOUNTER — APPOINTMENT (OUTPATIENT)
Dept: CARDIOLOGY | Facility: HOSPITAL | Age: 54
DRG: 208 | End: 2024-08-06
Payer: MEDICARE

## 2024-08-06 VITALS
OXYGEN SATURATION: 94 % | HEIGHT: 71 IN | SYSTOLIC BLOOD PRESSURE: 124 MMHG | RESPIRATION RATE: 14 BRPM | HEART RATE: 79 BPM | WEIGHT: 230.38 LBS | BODY MASS INDEX: 32.25 KG/M2 | TEMPERATURE: 97.9 F | DIASTOLIC BLOOD PRESSURE: 81 MMHG

## 2024-08-06 PROBLEM — E11.00 HYPEROSMOLAR HYPERGLYCEMIC STATE (HHS) (MULTI): Status: ACTIVE | Noted: 2024-08-06

## 2024-08-06 LAB
ALBUMIN SERPL BCP-MCNC: 3 G/DL (ref 3.4–5)
ALP SERPL-CCNC: 105 U/L (ref 33–110)
ALT SERPL W P-5'-P-CCNC: 12 U/L (ref 7–45)
ANION GAP SERPL CALC-SCNC: 11 MMOL/L (ref 10–20)
AST SERPL W P-5'-P-CCNC: 16 U/L (ref 9–39)
BASOPHILS # BLD AUTO: 0.05 X10*3/UL (ref 0–0.1)
BASOPHILS NFR BLD AUTO: 0.6 %
BILIRUB DIRECT SERPL-MCNC: 0.1 MG/DL (ref 0–0.3)
BILIRUB SERPL-MCNC: 0.4 MG/DL (ref 0–1.2)
BODY SURFACE AREA: 2.29 M2
BUN SERPL-MCNC: 7 MG/DL (ref 6–23)
CALCIUM SERPL-MCNC: 9 MG/DL (ref 8.6–10.6)
CHLORIDE SERPL-SCNC: 97 MMOL/L (ref 98–107)
CO2 SERPL-SCNC: 33 MMOL/L (ref 21–32)
CREAT SERPL-MCNC: 0.92 MG/DL (ref 0.5–1.05)
EGFRCR SERPLBLD CKD-EPI 2021: 74 ML/MIN/1.73M*2
EOSINOPHIL # BLD AUTO: 0.25 X10*3/UL (ref 0–0.7)
EOSINOPHIL NFR BLD AUTO: 3.1 %
ERYTHROCYTE [DISTWIDTH] IN BLOOD BY AUTOMATED COUNT: 18.4 % (ref 11.5–14.5)
GLUCOSE BLD MANUAL STRIP-MCNC: 110 MG/DL (ref 74–99)
GLUCOSE BLD MANUAL STRIP-MCNC: 139 MG/DL (ref 74–99)
GLUCOSE BLD MANUAL STRIP-MCNC: 146 MG/DL (ref 74–99)
GLUCOSE BLD MANUAL STRIP-MCNC: 174 MG/DL (ref 74–99)
GLUCOSE SERPL-MCNC: 135 MG/DL (ref 74–99)
HCT VFR BLD AUTO: 35.9 % (ref 36–46)
HGB BLD-MCNC: 10.8 G/DL (ref 12–16)
IMM GRANULOCYTES # BLD AUTO: 0.11 X10*3/UL (ref 0–0.7)
IMM GRANULOCYTES NFR BLD AUTO: 1.4 % (ref 0–0.9)
LYMPHOCYTES # BLD AUTO: 1.89 X10*3/UL (ref 1.2–4.8)
LYMPHOCYTES NFR BLD AUTO: 23.6 %
MAGNESIUM SERPL-MCNC: 1.71 MG/DL (ref 1.6–2.4)
MCH RBC QN AUTO: 27.3 PG (ref 26–34)
MCHC RBC AUTO-ENTMCNC: 30.1 G/DL (ref 32–36)
MCV RBC AUTO: 91 FL (ref 80–100)
MONOCYTES # BLD AUTO: 0.69 X10*3/UL (ref 0.1–1)
MONOCYTES NFR BLD AUTO: 8.6 %
NEUTROPHILS # BLD AUTO: 5.02 X10*3/UL (ref 1.2–7.7)
NEUTROPHILS NFR BLD AUTO: 62.7 %
NRBC BLD-RTO: 1.2 /100 WBCS (ref 0–0)
PHOSPHATE SERPL-MCNC: 4.1 MG/DL (ref 2.5–4.9)
PLATELET # BLD AUTO: 276 X10*3/UL (ref 150–450)
POTASSIUM SERPL-SCNC: 4.4 MMOL/L (ref 3.5–5.3)
PROT SERPL-MCNC: 6.8 G/DL (ref 6.4–8.2)
RBC # BLD AUTO: 3.96 X10*6/UL (ref 4–5.2)
SODIUM SERPL-SCNC: 137 MMOL/L (ref 136–145)
WBC # BLD AUTO: 8 X10*3/UL (ref 4.4–11.3)

## 2024-08-06 PROCEDURE — 82248 BILIRUBIN DIRECT: CPT

## 2024-08-06 PROCEDURE — 93270 REMOTE 30 DAY ECG REV/REPORT: CPT

## 2024-08-06 PROCEDURE — 82947 ASSAY GLUCOSE BLOOD QUANT: CPT

## 2024-08-06 PROCEDURE — A9575 INJ GADOTERATE MEGLUMI 0.1ML: HCPCS

## 2024-08-06 PROCEDURE — 75561 CARDIAC MRI FOR MORPH W/DYE: CPT | Performed by: RADIOLOGY

## 2024-08-06 PROCEDURE — 83735 ASSAY OF MAGNESIUM: CPT

## 2024-08-06 PROCEDURE — 2500000002 HC RX 250 W HCPCS SELF ADMINISTERED DRUGS (ALT 637 FOR MEDICARE OP, ALT 636 FOR OP/ED)

## 2024-08-06 PROCEDURE — 2550000001 HC RX 255 CONTRASTS

## 2024-08-06 PROCEDURE — 36415 COLL VENOUS BLD VENIPUNCTURE: CPT

## 2024-08-06 PROCEDURE — 84100 ASSAY OF PHOSPHORUS: CPT

## 2024-08-06 PROCEDURE — 85025 COMPLETE CBC W/AUTO DIFF WBC: CPT

## 2024-08-06 PROCEDURE — 99239 HOSP IP/OBS DSCHRG MGMT >30: CPT | Performed by: INTERNAL MEDICINE

## 2024-08-06 PROCEDURE — 2500000001 HC RX 250 WO HCPCS SELF ADMINISTERED DRUGS (ALT 637 FOR MEDICARE OP)

## 2024-08-06 PROCEDURE — 2500000004 HC RX 250 GENERAL PHARMACY W/ HCPCS (ALT 636 FOR OP/ED)

## 2024-08-06 PROCEDURE — 99233 SBSQ HOSP IP/OBS HIGH 50: CPT | Performed by: NURSE PRACTITIONER

## 2024-08-06 PROCEDURE — 80053 COMPREHEN METABOLIC PANEL: CPT

## 2024-08-06 PROCEDURE — 75561 CARDIAC MRI FOR MORPH W/DYE: CPT

## 2024-08-06 RX ORDER — NAPROXEN SODIUM 220 MG/1
81 TABLET, FILM COATED ORAL DAILY
Start: 2024-08-07

## 2024-08-06 RX ORDER — MENTHOL AND ZINC OXIDE .44; 20.625 G/100G; G/100G
1 OINTMENT TOPICAL 4 TIMES DAILY PRN
Start: 2024-08-06

## 2024-08-06 RX ORDER — INSULIN GLARGINE 100 [IU]/ML
8 INJECTION, SOLUTION SUBCUTANEOUS NIGHTLY
Start: 2024-08-06

## 2024-08-06 RX ORDER — SENNOSIDES 8.8 MG/5ML
5 LIQUID ORAL NIGHTLY
Start: 2024-08-06

## 2024-08-06 RX ORDER — POLYETHYLENE GLYCOL 3350 17 G/17G
17 POWDER, FOR SOLUTION ORAL DAILY
Start: 2024-08-07

## 2024-08-06 RX ORDER — DEXTROSE 50 % IN WATER (D50W) INTRAVENOUS SYRINGE
50 AS NEEDED
Start: 2024-08-06

## 2024-08-06 RX ORDER — GADOTERATE MEGLUMINE 376.9 MG/ML
40 INJECTION INTRAVENOUS
Status: COMPLETED | OUTPATIENT
Start: 2024-08-06 | End: 2024-08-06

## 2024-08-06 RX ORDER — ATORVASTATIN CALCIUM 40 MG/1
40 TABLET, FILM COATED ORAL NIGHTLY
Start: 2024-08-06

## 2024-08-06 RX ORDER — INSULIN LISPRO 100 [IU]/ML
0-10 INJECTION, SOLUTION INTRAVENOUS; SUBCUTANEOUS
Start: 2024-08-06

## 2024-08-06 RX ORDER — ONDANSETRON HYDROCHLORIDE 2 MG/ML
4 INJECTION, SOLUTION INTRAVENOUS EVERY 4 HOURS PRN
Start: 2024-08-06

## 2024-08-06 RX ORDER — FUROSEMIDE 20 MG/1
20 TABLET ORAL DAILY PRN
Start: 2024-08-06

## 2024-08-06 ASSESSMENT — COGNITIVE AND FUNCTIONAL STATUS - GENERAL
EATING MEALS: A LITTLE
MOVING FROM LYING ON BACK TO SITTING ON SIDE OF FLAT BED WITH BEDRAILS: A LOT
DRESSING REGULAR LOWER BODY CLOTHING: TOTAL
MOVING TO AND FROM BED TO CHAIR: TOTAL
PERSONAL GROOMING: A LOT
TOILETING: TOTAL
DRESSING REGULAR UPPER BODY CLOTHING: A LOT
STANDING UP FROM CHAIR USING ARMS: TOTAL
DAILY ACTIVITIY SCORE: 10
MOBILITY SCORE: 7
CLIMB 3 TO 5 STEPS WITH RAILING: TOTAL
HELP NEEDED FOR BATHING: TOTAL
WALKING IN HOSPITAL ROOM: TOTAL
TURNING FROM BACK TO SIDE WHILE IN FLAT BAD: TOTAL

## 2024-08-06 ASSESSMENT — PAIN SCALES - GENERAL
PAINLEVEL_OUTOF10: 0 - NO PAIN
PAINLEVEL_OUTOF10: 0 - NO PAIN

## 2024-08-06 ASSESSMENT — PAIN - FUNCTIONAL ASSESSMENT: PAIN_FUNCTIONAL_ASSESSMENT: 0-10

## 2024-08-06 NOTE — PROGRESS NOTES
"Subjective   CCTA without evidence of CAD  SR 80s  -140s  Denies CP/ SOB     Appreciate EP consult     Objective   Visit Vitals  /63   Pulse 89   Temp 36.6 °C (97.9 °F)   Resp 18   Ht 1.803 m (5' 11\")   Wt 104 kg (230 lb 6.1 oz)   SpO2 95%   BMI 32.13 kg/m²   Smoking Status Never   BSA 2.29 m²      Physical Exam  General: awake, alert, no acute distress  HEENT: no scleral icterus, room air   CV: RRR  Resp: CTA anteriorly   Extremities: 1+ BLE edema  Neuro: left side weakness, poor insight    Cardiographics    8/5 CCTA  IMPRESSION:  1. Normal coronary anatomy without evidence of atherosclerotic  changes or stenotic disease.  2. Left dominant coronary system.  3. Coronary calcium score 0.  4. Dilated main pulmonary artery (3.3 cm).      Transthoracic Echo (TTE) Limited 7/22:  1. The left ventricular systolic function is normal, with a visually estimated ejection fraction of 65-70%.  2. There is moderate left ventricular hypertrophy.  3. There is normal right ventricular global systolic function.  4. The pulmonary artery is not well visualized.  5. On call limited fellow echo.       Lab Review   Lab Results   Component Value Date     (L) 08/05/2024    K 4.2 08/05/2024    CL 97 (L) 08/05/2024    CO2 30 08/05/2024    BUN 6 08/05/2024    CREATININE 0.72 08/05/2024    GLUCOSE 131 (H) 08/05/2024    CALCIUM 8.9 08/05/2024     Lab Results   Component Value Date    WBC 8.2 08/05/2024    HGB 10.6 (L) 08/05/2024    HCT 34.1 (L) 08/05/2024    MCV 87 08/05/2024     08/05/2024        Assessment/Plan   53F w/ CVA c/b left hemiparesis and dysphagia (2016) s/p PEG placement (2016, replaced 5 times, removed unknown date), HTN, PE/DVT on Eliquis who was sent to the ED by [facility] for evaluation of encephalopathy. She was found to have an ZULMA with hyperkalemia, hyperglycemia, glucose greater than 1400, and the CT concerning for proctitis, she was treated with antibiotics, started with insulin " "drip, required Levophed briefly, and was intubated for airway protection.     Around 11:52 PM, patient was noted to go into V-fib arrest, per telemetry review patient had increasing episodes of NSVT and bigeminy prior to this arrest. The patient required 3 shocks. She received epi x 3, amiodarone 300mg, calcium x 1 , HCO3 x 1 during the code. She  intermittently had ROSC in between episodes.      8/1 Primary team had GOC discussion with patient and her POA (Aunt). They wish to proceed with ischemic eval/CMR/ICD  as indicated    8/5 per EP note   Patient with VF event likely triggered by severe electrolyte and metabollic derangements iso DKA/HHS with acidosis hyperglycemia and hypokalemia. She has cor CTA with calcium score of zero, TTE with preserved LVEF, no WMA. She is currently pending cMRI.   - If cMRI without any nidus for VF, no indication for ICD given VF triggered by severe metabolic derangements    Impression   #VF arrest    #Elevated troponin - peaked at 955  # paroxysmal AT    - Keep K+>4 and Mg+ >2   - OK to resume home Eliquis (hx of DVT/PE) from a cardiology standpoint    -recommend a 2 week \"preventice\" monitor at discharge  - Please have DC order in as early as possible on the day of discharge in order for monitor to be placed. The department is only here M-F 8a-4p and monitor needs to be placed on the day of DC. Place order for \"Holter\" for 7-15 days, type in \"preventice\" at the bottom of order and call [phone] to have monitor placed prior to DC)   - Please schedule a 4-6 week appointment with a  cardiologist to follow up results of monitor     Cardiology will follow up CMR  Cardiology will sign off       MAK Cole-CNP  Cardiology Consults    Please call with any questions  Pager [phone] M-F 7a-6p; Saturday 7a-2p  Pager [phone] all other times      "

## 2024-08-06 NOTE — PROGRESS NOTES
Subjective   Caryl Tucker is a 53 y.o. female on hospital day 16 reports no significant events.  Denies any nausea or vomiting with tube feeds or any pain from her tube.    Objective     Exam     Vitals:    08/06/24 0417 08/06/24 0753 08/06/24 1143 08/06/24 1523   BP: 124/76 124/63 119/70 124/81   Pulse: 84 89 77 79   Resp: 18 18 17 14   Temp: 36.6 °C (97.9 °F) 36.6 °C (97.9 °F) 36.6 °C (97.9 °F)    TempSrc:       SpO2: 97% 95% 97% 94%   Weight:       Height:          Intake/Output last 3 shifts:  I/O last 3 completed shifts:  In: 2483.7 (23.8 mL/kg) [I.V.:1277.7 (12.2 mL/kg); NG/GT:1206]  Out: 800 (7.7 mL/kg) [Urine:800 (0.2 mL/kg/hr)]  Weight: 104.5 kg     Physical Exam  Vitals reviewed.   Constitutional:       General: She is not in acute distress.     Appearance: She is obese. She is not ill-appearing, toxic-appearing or diaphoretic.   HENT:      Head: Normocephalic and atraumatic.   Cardiovascular:      Rate and Rhythm: Normal rate and regular rhythm.      Pulses: Normal pulses.      Heart sounds: No murmur heard.     No friction rub. No gallop.   Pulmonary:      Effort: Pulmonary effort is normal.      Breath sounds: No wheezing, rhonchi or rales.      Comments: Anteriorly clear  Abdominal:      General: Bowel sounds are normal. There is no distension.      Palpations: Abdomen is soft.      Tenderness: There is no guarding or rebound.      Comments: PEG tube with binder.  No tenderness near PEG tube noted   Musculoskeletal:      Comments: Trace bilateral lower extremity edema  1+ left arm edema where IV infiltrated without any significant erythema or tenderness to the area at this time   Skin:     General: Skin is warm and dry.   Neurological:      Mental Status: She is alert.      Comments: Left-sided weakness which is chronic   Psychiatric:         Mood and Affect: Mood normal.         Behavior: Behavior normal.      Comments: Remains with odd affect            Medications   apixaban, 5 mg, oral,  "q12h  aspirin, 81 mg, oral, Daily  atorvastatin, 40 mg, oral, Nightly  insulin glargine, 8 Units, subcutaneous, Nightly  insulin lispro, 0-10 Units, subcutaneous, q6h  [Held by provider] insulin lispro, 5 Units, subcutaneous, TID  polyethylene glycol, 17 g, oral, Daily  senna, 5 mL, oral, Nightly       PRN medications: dextrose, droperidol, glucagon, glucagon, heparin, LORazepam, menthol-zinc oxide, metoprolol, metoprolol, metoprolol, ondansetron, ondansetron, oxygen       Labs     All new labs reviewed:  some of the basic labs as follows -     Results from last 7 days   Lab Units 08/06/24 1134 08/05/24 0821 08/04/24  0733   WBC AUTO x10*3/uL 8.0 8.2 7.7   HEMOGLOBIN g/dL 10.8* 10.6* 10.9*   HEMATOCRIT % 35.9* 34.1* 37.0   PLATELETS AUTO x10*3/uL 276 289 283   NEUTROS PCT AUTO % 62.7 65.7 60.3   LYMPHS PCT AUTO % 23.6 22.7 27.0   MONOS PCT AUTO % 8.6 6.7 7.7   EOS PCT AUTO % 3.1 3.2 3.6            Results from last 72 hours   Lab Units 08/06/24 1134 08/05/24  0821 08/04/24  0733   SODIUM mmol/L 137 135* 138   POTASSIUM mmol/L 4.4 4.2 3.9   CHLORIDE mmol/L 97* 97* 99   CO2 mmol/L 33* 30 31   BUN mg/dL 7 6 6   CREATININE mg/dL 0.92 0.72 0.81     Results from last 72 hours   Lab Units 08/06/24 1134 08/05/24  0821 08/04/24  0733   ALK PHOS U/L 105 101 99   AST U/L 16 18 22   ALT U/L 12 14 18   BILIRUBIN TOTAL mg/dL 0.4 0.4 0.4   BILIRUBIN DIRECT mg/dL 0.1 0.1 0.0   ALBUMIN g/dL 3.0* 3.0* 3.0*   PROTEIN TOTAL g/dL 6.8 6.9 6.7     Results from last 72 hours   Lab Units 08/06/24 1134 08/05/24  0821 08/04/24  0733   GLUCOSE mg/dL 135* 131* 145*           No results found for: \"TR1\"  Lab Results   Component Value Date    URINECULTURE No growth 07/25/2024    BLOODCULT No growth at 4 days -  FINAL REPORT 07/25/2024    BLOODCULT No growth at 4 days -  FINAL REPORT 07/25/2024    BLOODCULT No growth at 4 days -  FINAL REPORT 07/21/2024    BLOODCULT Staphylococcus cohnii (AA) 07/21/2024            Imaging   MR cardiac " "morphology and function w and wo IV contrast  Narrative: Interpreted By:  Gerardo Soler and Dervishi Mario   STUDY:  MR CARDIAC MORPHOLOGY AND FUNCTION W AND WO IV CONTRAST;  8/6/2024  9:59 am      INDICATION:  Signs/Symptoms:s/p cardiac arrest in MICU. CCTA negative.  53-year-old female with ventricular tachycardia arrest. MRI was  ordered for further evaluation.      COMPARISON:  CTA angio coronary. 08/05/2024      ACCESSION NUMBER(S):  [id number]      ORDERING CLINICIAN:  TIANNA BALDERRAMA      TECHNIQUE:  Siemens 1.5 Jenniffer MRI scanner.  Turbo spin echo and balanced steady state free precession (bSSFP)  imaging for anatomic definition. Dynamic cine bSSFP for cardiac  chamber and wall-motion analysis, and valvular analysis. Flow  quantification sequences for hemodynamics. Delayed gadolinium  enhancement analysis after injection of 40 mL of intravenous  gadolinium based contrast.      FINDINGS:  CARDIAC CHAMBERS  Normal atrioventricular and ventriculoarterial concordance.      LEFT ATRIUM  Normal size.      RIGHT ATRIUM  Normal size.      INTERATRIAL SEPTUM  Intact.      LEFT VENTRICLE  The left ventricle is normal in size and shape, and has normal global  systolic function. The left ventricular wall measures at 1.3 cm in  diastole. There are no segmental wall motion abnormalities.  Quantitative left ventricular functional values are as follows: EDV =  121 cc; EDVi = 54 cc/m2 ESV = 50 cc; ESVi = 22 cc/m2  Stroke volume = 72 cc; SVi = 32 cc/m2  LVEF = 59 %  Absolute Cardiac Output = 5.94 l/min.; COi = 2.66 l/min/m2  LV mass = 140 gm; LVMi = 63 gm/m2      *Loretta CONTRERAS et al. Normalized left ventricular systolic and diastolic  function by steady state free precession cardiovascular magnetic  resonance. J Cardiovasc Magn Reson 2006; 8:417-26.      T1 map imaging demonstrates diffusely increased mapping times. T2 map  times are within normal limits.      Delayed-enhancement imaging reveals uniformly \"nulled\" " myocardium,  signifying that there has been no prior ischemic myocardial damage.  There is also no definite evidence of interstitial fibrosis to  suggest an infiltrative process.      RIGHT VENTRICLE  The right ventricle appears normal in size, shape, and has normal  qualitative systolic function. No segmental wall motion  abnormalities. No abnormal delayed enhancement in the myocardium.      INTERVENTRICULAR SEPTUM  Intact.      AORTIC VALVE  There is  no aortic regurgitation.  Flow quantification through the ascending aorta:  Forward volume =106 cc/beat  Reverse volume = 1 cc/beat  Net forward volume = 105 cc/beat  Aortic regurgitant fraction = 1 %      MITRAL VALVE  There is  no mitral regurgitation.      TRICUSPID VALVE  There is qualitative no tricuspid regurgitation.      THORACIC AORTA  The thoracic aorta appears normal in course, caliber, and contour.  There is no evidence for acute aortic pathology. The arch vessel  branching pattern is  normal.   All the arch branch vessels appear  widely patent in their proximal portions.      PULMONARY ARTERIES  The central pulmonary arteries appear  mildly dilated measuring 3.5  cm.      SYSTEMIC AND PULMONARY VEINS  Normal systemic venous and pulmonary venous return.  The SVC and IVC are of normal caliber.  Normal pulmonary venous anatomy.      CHEST  The chest wall is normal.  No significant lymphadenopathy or mass is seen in limited images of  the mediastinum. Limited imaging through the lungs reveals no gross  abnormalities. No pleural effusion.      UPPER ABDOMEN  Limited imaging through the upper abdomen reveals no abnormalities of  the visualized organs.      Impression: 1. The left ventricle is normal in size, shape, and has normal global  systolic function. Left ventricular ejection fraction = 59%. There  are no segmental wall motion abnormalities.  2. No evidence of delayed enhancement within the myocardium. Areas of  elevated T1 mapping times throughout the  myocardium are nonspecific  and may indicate underlying infiltrative cardiomyopathy. Myocardial  T2 times are normal.  3. Mildly dilated main pulmonary artery which can be seen with  pulmonary hypertension.  4. Normal aortic, mitral, and tricuspid valve function.      I personally reviewed the images/study and I agree with the findings  as stated by Resident Joaquín Gomez MD.      MACRO:  None      Signed by: Gerardo Soler 8/6/2024 11:21 AM  Dictation workstation:   [id number]     No results found for this or any previous visit from the past 1095 days.     Encounter Date: 07/21/24   Electrocardiogram, 12-lead PRN ACS symptoms   Result Value    Ventricular Rate 88    Atrial Rate 88    CA Interval 158    QRS Duration 118    QT Interval 378    QTC Calculation(Bazett) 457    P Axis 71    R Axis 20    T Axis 55    QRS Count 14    Q Onset 229    P Onset 150    P Offset 211    T Offset 418    QTC Fredericia 429    Narrative    Normal sinus rhythm  Nonspecific intraventricular conduction delay  Nonspecific ST and T wave abnormality  Abnormal ECG  When compared with ECG of 22-JUL-2024 00:22,  Sinus rhythm has replaced Junctional rhythm  ST no longer depressed in Inferior leads  Non-specific change in ST segment in Anterior leads  ST less depressed in Lateral leads  Confirmed by Aris Davila ([id number]) on 7/23/2024 5:22:40 PM          Assessment and Plan     Newly diagnosed diabetes mellitus in the setting of HHS: Blood glucose is doing well without needs for sliding scale  -Continue corrective sliding scale insulin  -Continue 8 units of Lantus.       V. tach arrest: MRI unremarkable and CT coronaries also completely unremarkable  -Follow-up cardiology in 4 to 6 weeks.  Will have 2-week monitor on discharge.  Cardiology assistance during this admission greatly appreciated  -Continue aspirin and statin      GI:  -Continue bowel care  -Continue tube feeds     CNS:  -Continue work with speech therapy.     DVT prophylaxis      Plan for discharge this evening     Dwain Landon MD      Of note the above was done with Dragon dictation system.  Note was proofread to minimize errors.

## 2024-08-06 NOTE — PROGRESS NOTES
Discharge Transfer to Facility  Patient will discharge today to: [facility]  Facility name: [facility]  Facility phone number: [phone]   [place] aware.  Bedside nurse (name) aware to call report: Yinka  Phone number for report: [phone]   Ambulance transport has been arranged.  Ambulance Company name: [facility]  [facility] phone number: [phone]   time: 7pm (team requested this transport time)  Patient and ANUP Garcia [phone] aware of transport time.  Patient has been approved for discharge after 8pm: n/a  Medical team and facility updated on transport time. Discharge orders and AVS sent to facility via [facility]. Unit secretary provided with blue transfer slip. Care coordinator will continue to follow for discharge planning needs.    Sugar Samuels RN  Transitional Care Coordinator/[facility]  [id number]

## 2024-08-06 NOTE — DISCHARGE SUMMARY
Discharge Diagnosis  Acute on chronic respiratory failure with hypercapnia (Multi).    Issues Requiring Follow-Up    - Diabetes: Newly diagnosed, no history in the chart - HbA1c 12.1. Patient started on an insulin regimen, discharged on 8u glargine nightly and SSI.    - Cardiology, s/p vfib arrest: Coronary CTA and cardiac MRI showing no significant abnormalities. Follow-up on rhythm monitor placed 8/6/24.    - Nutrition: Known history of CVA c/b left hemiparesis and dysphagia (2016), now s/p PEG tube insertion (8/2/24). Switched to bolus feeds, tolerating well. Diabetic friendly diet recommended, flush with water before and after feeds.    Discharge Meds     Your medication list        START taking these medications        Instructions Last Dose Given Next Dose Due   aspirin 81 mg chewable tablet  Start taking on: August 7, 2024      Chew 1 tablet (81 mg) once daily.       atorvastatin 40 mg tablet  Commonly known as: Lipitor      Take 1 tablet (40 mg) by mouth once daily at bedtime.       dextrose 50 % injection      Infuse 50 mL into a venous catheter if needed for low blood sugar - see comments (blood glucose less than 70 mg/dL).       glucagon 1 mg injection  Commonly known as: Glucagen      Inject 1 mg into the muscle every 15 minutes if needed for low blood sugar - see comments (For blood glucose 41 to 70 mg/dL and no IV access).       glucagon 1 mg injection  Commonly known as: Glucagen      Inject 1 mg into the muscle every 15 minutes if needed for low blood sugar - see comments (For blood glucose less than or equal to 40 mg/dL and no IV access).       insulin glargine 100 unit/mL injection  Commonly known as: Lantus      Inject 8 Units under the skin once daily at bedtime. Take as directed per insulin instructions.       insulin lispro 100 unit/mL injection  Commonly known as: HumaLOG      Inject 0-10 Units under the skin 4 times a day before meals. Take as directed per insulin instructions.        menthol-zinc oxide 0.44-20.6 % ointment  Commonly known as: Calmoseptine - Risamine      Apply 1 Application topically 4 times a day as needed (To avoid irritation).       ondansetron 4 mg/2 mL injection  Commonly known as: Zofran      Infuse 2 mL (4 mg) into a venous catheter every 4 hours if needed for nausea.              CHANGE how you take these medications        Instructions Last Dose Given Next Dose Due   apixaban 5 mg tablet  Commonly known as: Eliquis  What changed: when to take this      Take 1 tablet (5 mg) by mouth every 12 hours.       furosemide 20 mg tablet  Commonly known as: Lasix  What changed:   when to take this  reasons to take this      Take 1 tablet (20 mg) by mouth once daily as needed (for weight gain or visible water retention).       polyethylene glycol 17 gram packet  Commonly known as: Glycolax, Miralax  Start taking on: August 7, 2024  What changed:   when to take this  reasons to take this      Take 17 g by mouth once daily.       senna 8.8 mg/5 mL syrup  Commonly known as: Senokot  What changed: when to take this      Take 5 mL by mouth once daily at bedtime.              CONTINUE taking these medications        Instructions Last Dose Given Next Dose Due   docusate sodium 100 mg capsule  Commonly known as: Colace           ergocalciferol 1.25 MG (41986 UT) capsule  Commonly known as: Vitamin D-2                  STOP taking these medications      acetaminophen 325 mg tablet  Commonly known as: Tylenol        albuterol 2.5 mg /3 mL (0.083 %) nebulizer solution        cholecalciferol 125 MCG (5000 UT) capsule  Commonly known as: Vitamin D-3        hydroCHLOROthiazide 50 mg tablet  Commonly known as: HYDRODiuril        lisinopril 40 mg tablet        potassium chloride CR 20 mEq ER tablet  Commonly known as: Klor-Con M20                  Where to Get Your Medications        Information about where to get these medications is not yet available    Ask your nurse or doctor about these  medications  apixaban 5 mg tablet  aspirin 81 mg chewable tablet  atorvastatin 40 mg tablet  dextrose 50 % injection  furosemide 20 mg tablet  glucagon 1 mg injection  glucagon 1 mg injection  insulin glargine 100 unit/mL injection  insulin lispro 100 unit/mL injection  menthol-zinc oxide 0.44-20.6 % ointment  ondansetron 4 mg/2 mL injection  polyethylene glycol 17 gram packet  senna 8.8 mg/5 mL syrup         Test Results Pending At Discharge  Pending Labs       Order Current Status    Extra Urine Gray Tube Collected (07/21/24 1724)    Urinalysis with Reflex Culture and Microscopic In process            Hospital Course    Patient was admitted from the nursing home with altered mental status (unable to answer questions, drool pooling in her mouth, GCS 10), hypoxia, hypotension, and tachycardia.    ED Course:  Labs showed Hyperosmolar Hyperglycemic State (Serum osmolality 416, Na 150 - corrected 171, glucose 1498), ZULMA (BUN 80, Cr 3.57), and hyperkalemia (K 6.1). Imaging showed concern for proctitis and a large stool burden.  She was started on an Insulin drip, Norepinephrine drip, and received 2g of calcium gluconate, 1L LR, Cefepime, Vancomycin, Metronidazole. Intubation was required due to worsening respiratory acidosis and mental status. Additionally noted to be COVID positive.    ICU Course:  Patient was admitted to the ICU for acute hypercapnic respiratory failure and encephalopathy. She experienced V. Fib cardiac arrest on the first night, with ROSC achieved. EKG showed sinus rhythm without ischemic changes. Troponin elevated initially, then downtrended over 2 days. She received Vancomycin, Metronidazole, and Aztreonam (7/21 - 7/24). Additionally, she continued to have a heavy stool burden with no bowel movements, concerning for fecal impaction. Manual disimpaction performed on 7/24 with subsequent bowel movements.    Wards Course:  The patient was transferred to the wards on 7/25. Continued on Midodrine,  weaned off by 7/29. She removed her Dobhoff tube on 7/29. MBSS was performed, SLT recommended NPO with alternative feeding techniques. Patient was unable to tolerate PO intake, therefore after discussion with POA, decision was made for PEG tube placement completed on 8/2. She tolerated continuous feeds and was switched to bolus feeds.  Coronary CTA and Cardiac MRI were performed, negative for ischemic cardiomyopathy. EP was consulted; no indication for ICD placement, recommended rhythm monitor for 14 days.    She was discharged on 8/6/2024 to [facility].    Pertinent Physical Exam At Time of Discharge  Physical Exam  Constitutional:       Appearance: Normal appearance.   Eyes:      Pupils: Pupils are equal, round, and reactive to light.   Cardiovascular:      Rate and Rhythm: Normal rate and regular rhythm.   Pulmonary:      Effort: Pulmonary effort is normal.      Breath sounds: Normal breath sounds.   Abdominal:      Palpations: Abdomen is soft.   Musculoskeletal:      Right forearm: Swelling present.   Skin:     General: Skin is warm.   Neurological:      Mental Status: She is alert. Mental status is at baseline.         Outpatient Follow-Up  Future Appointments   Date Time Provider Department Center   8/26/2024  2:00 PM Jose Luis Ortiz MD [id number] [facility]         Juan Abdullahi MD, MSc  PGY-1 Resident  Internal Medicine

## 2024-08-06 NOTE — SIGNIFICANT EVENT
53F PMHx CVA 2016 c/b L hemiparesis and dysphagia s/p PEG (now removed), HTN, PE/DVT on eliquis. Admitted for encephalopathy course c/b VF arrest, EP consulted for ICD for secondary prevention.      #VF Arrest   Patient with VF event likely triggered by severe electrolyte and metabollic derangements iso DKA/HHS with acidosis hyperglycemia and hypokalemia. She has cor CTA with calcium score of zero, TTE with preserved LVEF, no WMA.   - cMRI with no clear nidus for VF, no role for ICD in this case as it was a triggered event   - Recommend d/c with 14d event monitor given paroxysmal atach seen on tele, place order for event monitor on day of discharge  - Please arrange general cardiology followup for patient prior to discharge      EP will sign off.     Juliet Gillespie  Cardiology Fellow   PGY-5

## 2024-08-06 NOTE — NURSING NOTE
RN attempted to call Nurse to nurse report to [facility] at 1517. Call transferred to unit and there was no answer.     RN attempted to call report again without success at [phone]    RN Attempted to call report again at 1846 with no success

## 2024-08-22 LAB — BODY SURFACE AREA: 2.29 M2

## 2024-08-23 PROBLEM — I69.392 FACIAL WEAKNESS FOLLOWING CEREBRAL INFARCTION: Status: ACTIVE | Noted: 2020-11-30

## 2024-08-23 PROBLEM — R13.12 DYSPHAGIA, OROPHARYNGEAL PHASE: Status: ACTIVE | Noted: 2020-11-30

## 2024-08-23 PROBLEM — Z86.79 HISTORY OF HYPERTENSION: Status: ACTIVE | Noted: 2024-08-23

## 2024-08-23 PROBLEM — Z86.69 HISTORY OF BRAIN DISORDER: Status: ACTIVE | Noted: 2024-08-23

## 2024-08-23 PROBLEM — I69.322 DYSARTHRIA FOLLOWING CEREBRAL INFARCTION: Status: ACTIVE | Noted: 2020-11-30

## 2024-08-23 PROBLEM — K94.23 PEG TUBE MALFUNCTION (MULTI): Status: ACTIVE | Noted: 2024-08-23

## 2024-08-23 PROBLEM — G93.40 ENCEPHALOPATHY, UNSPECIFIED: Status: ACTIVE | Noted: 2020-11-30

## 2024-08-23 PROBLEM — I69.354 HEMIPLEGIA AND HEMIPARESIS FOLLOWING CEREBRAL INFARCTION AFFECTING LEFT NON-DOMINANT SIDE (MULTI): Status: ACTIVE | Noted: 2020-11-30

## 2024-08-23 PROBLEM — D64.9 ANEMIA, UNSPECIFIED: Status: ACTIVE | Noted: 2020-11-30

## 2024-08-23 PROBLEM — Z86.2 HISTORY OF ANEMIA: Status: ACTIVE | Noted: 2024-08-23

## 2024-08-23 PROBLEM — K76.89 OTHER SPECIFIED DISEASES OF LIVER: Status: ACTIVE | Noted: 2020-11-30

## 2024-08-23 PROBLEM — U07.1 COVID-19: Status: ACTIVE | Noted: 2020-11-30

## 2024-08-23 PROBLEM — I10 ESSENTIAL (PRIMARY) HYPERTENSION: Status: ACTIVE | Noted: 2020-11-30

## 2024-08-23 PROBLEM — Z86.711 PERSONAL HISTORY OF PULMONARY EMBOLISM: Status: ACTIVE | Noted: 2020-11-30

## 2024-08-23 PROBLEM — J81.1 CHRONIC PULMONARY EDEMA (HHS-HCC): Status: ACTIVE | Noted: 2024-08-23

## 2024-08-23 PROBLEM — G81.94 LEFT HEMIPARESIS (MULTI): Status: ACTIVE | Noted: 2024-08-23

## 2024-09-23 ENCOUNTER — APPOINTMENT (OUTPATIENT)
Dept: CARDIOLOGY | Facility: CLINIC | Age: 54
End: 2024-09-23
Payer: MEDICARE

## 2024-09-23 DIAGNOSIS — I10 ESSENTIAL (PRIMARY) HYPERTENSION: Primary | ICD-10-CM

## 2024-09-23 NOTE — PROGRESS NOTES
Location of visit: [facility] [address] [place] [address]   Type of Visit: New    Chief Complaint:  Patient was referred to Cardiology by Dr. Flowers ref. provider found for No chief complaint on file..    History Of Present Illness:    Caryl Tucker is a 53 y.o. female, with history significant for CVA 2016 c/b L hemiparesis and dysphagia s/p PEG (now removed), HTN, PE/DVT on eliquis , who visits Cardiology today as a new patient  for post-hospitalization visit.    Patient recently hospitalized in July-August 2024 for encephalopathy 2/2 HHS/DKA and course c/b VF arrest.  VF arrest happened 7/21 11pm, she was admitted to the MICU with HHS/DKA (hypokalemia, hyperglycemia, acidosis). Code lasted 46 minutes with ROSC in between, she received defibrillation x3 for VF.  Prior to arrest was noted to have NSVT and bigeminy, Cardiology consult team felt this VF was most likely 2/2 severe electrolyte derangements iso DKA (K was 3.2, likely much lower).  Her troponin peaked at 955, TTE with preserved LVEF, no WMA.  Patient underwent CTA cors which showed no e/o atherosclerotic disease and Ca score of zero.  cMRI performed for further evaluation showed EF 59%, no WMA, no evidence of delayed enhancement within the myocardium and areas of nonspecific elevated T1 mapping, which may indicate underlying infiltrative cardiomyopathy.   EP was consulted, as cMRI with no clear nidus for VF, no role for ICD in this case as it was a triggered event. Therefore patient discharged with 14d event monitor given paroxysmal atach seen on tele.        Patient denies chest pain, dyspnea on exertion, shortness of breath, orthopnea, PND, nocturia, edema, palpitations, dizziness, lightheadedness, syncope, claudication, or snoring/apnea.    Blood pressure: ***/*** mmHg  HR: *** bpm    Today's ECG shows sinus rhythm at *** bpm, normal AV conduction, and normal ventricular repolarization.    Past Medical History:  She has a past medical history of Anemia (2016), Chronic pulmonary  edema (HHS-HCC) (2016), Dysarthria following cerebral infarction (2016), Dysphagia following cerebral infarction (2016), Dyspnea (2016), Encephalopathy (2016), Facial weakness following cerebral infarction (2016), Hemiplegia and hemiparesis following cerebral infarction affecting left non-dominant side (Multi) (2016), Hypertension (2016), Personal history of DVT (deep vein thrombosis) (2016), and Personal history of pulmonary embolism (2016).    Past Surgical History:  She has a past surgical history that includes MR angio head wo IV contrast (08/19/2016); MR angio neck wo IV contrast (08/19/2016); Gastrostomy (08/31/2016); and PEG tube removal.    Social History:  She reports that she has never smoked. She does not have any smokeless tobacco history on file. She reports that she does not drink alcohol and does not use drugs.    Family History:  No family history on file.  Allergies:  Penicillins    Outpatient Medications:  Current Outpatient Medications   Medication Instructions    apixaban (ELIQUIS) 5 mg, oral, Every 12 hours    aspirin 81 mg, oral, Daily    atorvastatin (LIPITOR) 40 mg, oral, Nightly    dextrose 50 % injection 50 mL, intravenous, As needed    docusate sodium (COLACE) 100 mg, oral, 2 times daily    ergocalciferol (VITAMIN D-2) 1.25 mg, oral, Once Weekly, Tuesday    furosemide (LASIX) 20 mg, oral, Daily PRN    glucagon (GLUCAGEN) 1 mg, intramuscular, Every 15 min PRN    glucagon (GLUCAGEN) 1 mg, intramuscular, Every 15 min PRN    insulin glargine (LANTUS) 8 Units, subcutaneous, Nightly, Take as directed per insulin instructions.    insulin lispro (HUMALOG) 0-10 Units, subcutaneous, 4 times daily before meals and nightly, Take as directed per insulin instructions.    menthol-zinc oxide (Calmoseptine - Risamine) 0.44-20.6 % ointment 1 Application, Topical, 4 times daily PRN    ondansetron (ZOFRAN) 4 mg, intravenous, Every 4 hours PRN    polyethylene glycol (GLYCOLAX, MIRALAX) 17 g, oral, Daily     senna (Senokot) 8.8 mg/5 mL syrup 5 mL, oral, Nightly     Last Recorded Vitals:  There were no vitals filed for this visit.  Physical Exam:      8/6/2024     3:23 PM 8/6/2024    11:43 AM 8/6/2024     7:53 AM 8/6/2024     4:17 AM 8/5/2024    11:44 PM 8/5/2024     9:03 PM   Vitals   Systolic 124 119 124 124 124 131   Diastolic 81 70 63 76 76 76   Heart Rate 79 77 89 84 96 92   Temp  36.6 °C (97.9 °F) 36.6 °C (97.9 °F) 36.6 °C (97.9 °F) 37 °C (98.6 °F) 37.3 °C (99.1 °F)   Resp 14 17 18 18 18 18     Wt Readings from Last 5 Encounters:   08/01/24 104 kg (230 lb 6.1 oz)   07/31/18 80.2 kg (176 lb 12.9 oz)   05/14/18 79.8 kg (175 lb 14.8 oz)     General: Sitting up comfortably in chair; in no apparent distress.  HEENT: Normocephalic; atraumatic. Well hydrated.  Eyes: Anicteric sclera. Extraocular movement intact.  Neck: Supple; no thyromegaly; normal jugular venous pressure, no bruits.  Respiratory: Bilateral air entry equal. No wheezing.  Cardiovascular: Normal S1, S2; no murmurs auscultated.  Abdomen: Nondistended; nontender. (+) bowel sounds.  Extremities: No peripheral edema present. Pulses 2+ diffusely.  Neurological: Oriented to time, place, and person; nonfocal.  Psychiatric: Normal affect.     Last Labs Reviewed:  CBC -  Recent Labs     08/06/24  1134 08/05/24  0821 08/04/24  0733 08/03/24  0812 08/02/24  0841   WBC 8.0 8.2 7.7 7.4 8.3   HGB 10.8* 10.6* 10.9* 10.8* 10.9*   HCT 35.9* 34.1* 37.0 35.8* 35.6*    289 283 256 253   MCV 91 87 91 92 87     CMP -  Recent Labs     08/06/24  1134 08/05/24  0821 08/04/24  0733 08/03/24  0812 08/02/24  0842    135* 138 138 140   K 4.4 4.2 3.9 3.4* 3.9   CL 97* 97* 99 98 100   CO2 33* 30 31 33* 30   ANIONGAP 11 12 12 10 14   BUN 7 6 6 4* 5*   CREATININE 0.92 0.72 0.81 0.71 0.76   EGFR 74 >90 86 >90 >90   MG 1.71 1.85 1.83 2.05 1.64   CALCIUM 9.0 8.9 9.0 8.7 8.3*     Recent Labs     08/06/24  1134 08/05/24  0821 08/04/24  0733 08/03/24  0812 08/02/24  0842  "07/07/21  2017 07/07/21  1110   ALBUMIN 3.0* 3.0* 3.0* 3.0* 3.0*   < > 3.7   ALKPHOS 105 101 99 99 97   < > 91   ALT 12 14 18 23 29   < > 12   AST 16 18 22 32 40*   < > 19   BILITOT 0.4 0.4 0.4 0.5 0.5   < > 1.0   LIPASE  --   --   --   --   --   --  1,752*    < > = values in this interval not displayed.     LIPID PANEL -   No results for input(s): \"CHOL\", \"LDLF\", \"LDLCALC\", \"HDL\", \"TRIG\" in the last 08699 hours.  COAGULATION PANEL -  Recent Labs     08/05/24  0821 08/04/24  0733 08/04/24  0137 07/29/24  0747 07/29/24  0124 07/27/24  1606 07/27/24  1041 07/23/24  2210 07/21/24  1121   INR  --   --   --   --  1.0  --  1.0  --  1.7*   HAUF 0.6 0.5 0.4   < > 0.5   < > 0.3   < >  --    HAPTOGLOBIN  --   --   --   --   --   --  153  --   --     < > = values in this interval not displayed.     HEME/ENDO -  Recent Labs     07/21/24  1121   TSH 3.43   HGBA1C 12.1*     CARDIOVASCULAR  Recent Labs     08/05/24  0821 07/22/24  2031 07/22/24  1612 07/22/24  0010 07/21/24  1121   TROPHS  --  709* 955* 304* 28   BNP  --   --   --   --  6   CRP 8.51*  --   --   --   --      Last Cardiology/Imaging Tests Personally Reviewed (if images available) and Interpreted:  ECG:  Encounter Date: 07/21/24   Electrocardiogram, 12-lead PRN ACS symptoms   Result Value    Ventricular Rate 88    Atrial Rate 88    MD Interval 158    QRS Duration 118    QT Interval 378    QTC Calculation(Bazett) 457    P Axis 71    R Axis 20    T Axis 55    QRS Count 14    Q Onset 229    P Onset 150    P Offset 211    T Offset 418    QTC Fredericia 429    Narrative    Normal sinus rhythm  Nonspecific intraventricular conduction delay  Nonspecific ST and T wave abnormality  Abnormal ECG  When compared with ECG of 22-JUL-2024 00:22,  Sinus rhythm has replaced Junctional rhythm  ST no longer depressed in Inferior leads  Non-specific change in ST segment in Anterior leads  ST less depressed in Lateral leads  Confirmed by Aris Davila ([id number]) on 7/23/2024 5:22:40 PM "   Electrocardiogram, 12-lead PRN ACS symptoms 07/22/2024    Echocardiogram:  No results found for this or any previous visit from the past 1825 days.  Transthoracic Echo (TTE) Complete 07/22/2024  PHYSICIAN INTERPRETATION:  Left Ventricle: The left ventricular systolic function is normal, with a visually estimated ejection fraction of 70-75%. There are no regional wall motion abnormalities. The left ventricular cavity size is normal. There is no evidence of left ventricular hypertrophy. Spectral Doppler shows an impaired relaxation pattern of left ventricular diastolic filling.  Left Atrium: The left atrium is normal in size.  Right Ventricle: The right ventricle is normal in size. There is normal right ventricular global systolic function.  Right Atrium: The right atrium is normal in size.  Aortic Valve: The aortic valve is trileaflet. There are increased aortic valve velocities due to increased flow/dynamic ejection. There is no evidence of aortic valve regurgitation. The peak instantaneous gradient of the aortic valve is 10.8 mmHg.  Mitral Valve: The mitral valve is mildly thickened. There is mild mitral annular calcification. There is trace mitral valve regurgitation.  Tricuspid Valve: The tricuspid valve is structurally normal. There is trace tricuspid regurgitation.  Pulmonic Valve: The pulmonic valve is not well visualized. There is physiologic pulmonic valve regurgitation.  Pericardium: There is a trivial pericardial effusion.  Aorta: The aortic root is normal.  Pulmonary Artery: The tricuspid regurgitant velocity is 2.12 m/s, and with an estimated right atrial pressure of 3 mmHg, the estimated pulmonary artery pressure is normal with the RVSP at 21.0 mmHg.  Systemic Veins: The inferior vena cava size appears small. There is IVC inspiratory collapse greater than 50%.  In comparison to the previous echocardiogram(s): Compared with study dated 8/22/2016, no significant change.      CONCLUSIONS:  1. The left  ventricular systolic function is normal, with a visually estimated ejection fraction of 70-75%.  2. Spectral Doppler shows an impaired relaxation pattern of left ventricular diastolic filling.  3. There is no evidence of left ventricular hypertrophy.  4. There is normal right ventricular global systolic function.     Cath:  No results found for this or any previous visit from the past 1825 days.  No results found for this or any previous visit from the past 3650 days.  No results found for this or any previous visit from the past 1095 days.    Stress Test:  No results found for this or any previous visit from the past 1825 days.  No results found for this or any previous visit from the past 1095 days.    Cardiac CT/MRI:  MR cardiac morphology and function w and wo IV contrast 08/06/2024  IMPRESSION:  1. The left ventricle is normal in size, shape, and has normal global  systolic function. Left ventricular ejection fraction = 59%. There  are no segmental wall motion abnormalities.  2. No evidence of delayed enhancement within the myocardium. Areas of  elevated T1 mapping times throughout the myocardium are nonspecific  and may indicate underlying infiltrative cardiomyopathy. Myocardial  T2 times are normal.  3. Mildly dilated main pulmonary artery which can be seen with  pulmonary hypertension.  4. Normal aortic, mitral, and tricuspid valve function.    CT angio coronary 8/5/2024  FINDINGS:  POTENTIAL STUDY LIMITATIONS:  Motion artifact.      CORONARY ARTERIES:      CORONARY ANATOMY:  There is normal origin of the coronary arteries.      CORONARY ARTERY CALCIUM SCORE - 0.      LEFT MAIN CORONARY ARTERY:  The left main is normal sized vessel that  bifurcates into the LAD  and circumflex. There is no significant atherosclerotic change or  stenotic disease.      LEFT ANTERIOR DESCENDING ARTERY:  The LAD is a normal size vessel that  wraps around the apex.  It gives rise to  3 acute diagonal branches.  There is no  significant atherosclerotic change or stenotic disease.      LEFT CIRCUMFLEX ARTERY:  The LCX is a normal size vessel, which is  dominant.  It gives rise to  2 obtuse marginal branches. The distal vessel  supplies the PDA. There is no significant atherosclerotic change or  stenotic disease.      RIGHT CORONARY ARTERY:  The RCA is a normal size vessel, which is  non-dominant.  It gives rise to a  conus branch,  shameka branch, and  2 acute  marginal branches. There is no significant atherosclerotic change or  stenotic disease.      CARDIAC CHAMBERS:  The cardiac chambers demonstrate normal atrioventricular and  ventriculoarterial concordance, and systemic and pulmonary venous  return.      LEFT ATRIUM:  Normal size ( 26 - cm2)      RIGHT ATRIUM:  Normal size ( 19 - cm2)      INTERATRIAL SEPTUM:  Intact.      LEFT VENTRICLE:  Normal size (3.8 - cm)      RIGHT VENTRICLE:  Normal size (3.6 - cm)      AORTIC VALVE:  The aortic valve is  trileaflet in morphology.  No calcifications.      MITRAL VALVE:  No thickening/calcification.      THORACIC AORTA:  The visualized thoracic aorta is normal in course, caliber, and  contour. There is no acute aortic pathology, such as dissection,  intramural hematoma, or contained rupture. The aortic arch is not  included on this examination.      Dilated main pulmonary artery 3.3 cm.      PERICARDIUM:  There is no pericardial effusion of thickening.      CHEST:  The chest wall is normal.  No significant lymphadenopathy or mass is seen in limited images of  the mediastinum. Limited imaging through the lungs reveals no gross  abnormalities.Bibasal atelectasis. No pleural effusion or  pneumothorax.      UPPER ABDOMEN:  Limited imaging through the upper abdomen reveals no abnormalities of  the visualized organs.      IMPRESSION:  1. Normal coronary anatomy without evidence of atherosclerotic  changes or stenotic disease.  2. Left dominant coronary system.  3. Coronary calcium score 0.  4.  Dilated main pulmonary artery (3.3 cm).      Other CT:  *** No results found.    CV RISK FACTORS:   # Hypertension: Last BP:  .  # Hyperlipidemia: Last Tchol No results found for requested labs within last 365 days. / LDL No results found for requested labs within last 365 days. / HDL No results found for requested labs within last 365 days. / TRIG No results found for requested labs within last 365 days. (No results in last year.).  # Type II Diabetes Mellitus: Last A1c 12.1 (7/21/2024: 11:21 AM).  # Obesity: Last BMI:  .  # CKD: Last BUN/Cr (GFR): 7/0.92 (74), 8/6/2024: 11:34 AM.    ASCV RISK:  The ASCVD Risk score (Jay FERREIRA, et al., 2019) failed to calculate for the following reasons:    Cannot find a previous HDL lab    Cannot find a previous total cholesterol lab    Assessment:  Caryl Tucker is a 53 y.o. female, with history significant for CVA 2016 c/b L hemiparesis and dysphagia s/p PEG (now removed), HTN, PE/DVT on eliquis , who visits Cardiology today as a new patient  for follow up after recent hospitalization in July-August 2024 for encephalopathy 2/2 HHS/DKA and course c/b VF arrest thought to be iso electrolyte derrangements. Inpatient work up showed TTE with preserved EF, CT Coronaries with caclium score of zero and cMRI with no clear nidus for VF. Therefore, patient discharged with 14d event monitor given paroxysmal atach seen on tele.  Assessment & Plan  Essential (primary) hypertension      {Follow up results:66834}  I spent *** minutes assessing the case between pre-charting, face-to-face patient interaction, and documentation    Jose Luis Ortiz MD

## 2024-11-01 ENCOUNTER — LAB (OUTPATIENT)
Dept: LAB | Facility: LAB | Age: 54
End: 2024-11-01
Payer: MEDICARE

## 2024-11-01 ENCOUNTER — OFFICE VISIT (OUTPATIENT)
Dept: CARDIOLOGY | Facility: HOSPITAL | Age: 54
End: 2024-11-01
Payer: MEDICARE

## 2024-11-01 VITALS
DIASTOLIC BLOOD PRESSURE: 96 MMHG | HEART RATE: 93 BPM | BODY MASS INDEX: 32.96 KG/M2 | SYSTOLIC BLOOD PRESSURE: 191 MMHG | WEIGHT: 210 LBS | HEIGHT: 67 IN

## 2024-11-01 DIAGNOSIS — E08.59 DIABETES MELLITUS DUE TO UNDERLYING CONDITION WITH OTHER CIRCULATORY COMPLICATIONS: ICD-10-CM

## 2024-11-01 DIAGNOSIS — I49.01 CARDIAC ARREST WITH VENTRICULAR FIBRILLATION: ICD-10-CM

## 2024-11-01 DIAGNOSIS — I10 PRIMARY HYPERTENSION: ICD-10-CM

## 2024-11-01 DIAGNOSIS — I49.01 CARDIAC ARREST WITH VENTRICULAR FIBRILLATION: Primary | ICD-10-CM

## 2024-11-01 DIAGNOSIS — I63.89 CEREBROVASCULAR ACCIDENT (CVA) DUE TO OTHER MECHANISM: ICD-10-CM

## 2024-11-01 DIAGNOSIS — E10.59 TYPE 1 DIABETES MELLITUS WITH OTHER CIRCULATORY COMPLICATION: ICD-10-CM

## 2024-11-01 DIAGNOSIS — I46.9 CARDIAC ARREST WITH VENTRICULAR FIBRILLATION: ICD-10-CM

## 2024-11-01 DIAGNOSIS — I46.9 CARDIAC ARREST WITH VENTRICULAR FIBRILLATION: Primary | ICD-10-CM

## 2024-11-01 LAB
ALBUMIN SERPL BCP-MCNC: 3.5 G/DL (ref 3.4–5)
ALP SERPL-CCNC: 155 U/L (ref 33–110)
ALT SERPL W P-5'-P-CCNC: 7 U/L (ref 7–45)
ANION GAP SERPL CALC-SCNC: 11 MMOL/L (ref 10–20)
AST SERPL W P-5'-P-CCNC: 11 U/L (ref 9–39)
BILIRUB SERPL-MCNC: 0.6 MG/DL (ref 0–1.2)
BUN SERPL-MCNC: 7 MG/DL (ref 6–23)
CALCIUM SERPL-MCNC: 8.9 MG/DL (ref 8.6–10.3)
CHLORIDE SERPL-SCNC: 99 MMOL/L (ref 98–107)
CHOLEST SERPL-MCNC: 112 MG/DL (ref 0–199)
CHOLESTEROL/HDL RATIO: 3.2
CO2 SERPL-SCNC: 34 MMOL/L (ref 21–32)
CREAT SERPL-MCNC: 0.72 MG/DL (ref 0.5–1.05)
EGFRCR SERPLBLD CKD-EPI 2021: >90 ML/MIN/1.73M*2
ERYTHROCYTE [DISTWIDTH] IN BLOOD BY AUTOMATED COUNT: 16 % (ref 11.5–14.5)
EST. AVERAGE GLUCOSE BLD GHB EST-MCNC: 140 MG/DL
GLUCOSE SERPL-MCNC: 111 MG/DL (ref 74–99)
HBA1C MFR BLD: 6.5 %
HCT VFR BLD AUTO: 41.5 % (ref 36–46)
HDLC SERPL-MCNC: 34.7 MG/DL
HGB BLD-MCNC: 12.8 G/DL (ref 12–16)
MCH RBC QN AUTO: 26.8 PG (ref 26–34)
MCHC RBC AUTO-ENTMCNC: 30.8 G/DL (ref 32–36)
MCV RBC AUTO: 87 FL (ref 80–100)
NON-HDL CHOLESTEROL: 77 MG/DL (ref 0–149)
NRBC BLD-RTO: 0 /100 WBCS (ref 0–0)
PLATELET # BLD AUTO: 317 X10*3/UL (ref 150–450)
POTASSIUM SERPL-SCNC: 3.8 MMOL/L (ref 3.5–5.3)
PROT SERPL-MCNC: 7.5 G/DL (ref 6.4–8.2)
RBC # BLD AUTO: 4.77 X10*6/UL (ref 4–5.2)
SODIUM SERPL-SCNC: 140 MMOL/L (ref 136–145)
WBC # BLD AUTO: 7.8 X10*3/UL (ref 4.4–11.3)

## 2024-11-01 PROCEDURE — 83036 HEMOGLOBIN GLYCOSYLATED A1C: CPT

## 2024-11-01 PROCEDURE — 82465 ASSAY BLD/SERUM CHOLESTEROL: CPT

## 2024-11-01 PROCEDURE — 83718 ASSAY OF LIPOPROTEIN: CPT

## 2024-11-01 PROCEDURE — 99215 OFFICE O/P EST HI 40 MIN: CPT | Performed by: INTERNAL MEDICINE

## 2024-11-01 PROCEDURE — 93005 ELECTROCARDIOGRAM TRACING: CPT | Performed by: INTERNAL MEDICINE

## 2024-11-01 PROCEDURE — 36415 COLL VENOUS BLD VENIPUNCTURE: CPT

## 2024-11-01 PROCEDURE — 80053 COMPREHEN METABOLIC PANEL: CPT

## 2024-11-01 PROCEDURE — 85027 COMPLETE CBC AUTOMATED: CPT

## 2024-11-01 RX ORDER — NIFEDIPINE 60 MG/1
60 TABLET, FILM COATED, EXTENDED RELEASE ORAL
Qty: 90 TABLET | Refills: 3 | Status: SHIPPED | OUTPATIENT
Start: 2024-11-01 | End: 2025-11-01

## 2024-11-01 ASSESSMENT — ENCOUNTER SYMPTOMS
OCCASIONAL FEELINGS OF UNSTEADINESS: 1
DEPRESSION: 0
LOSS OF SENSATION IN FEET: 1

## 2024-11-04 LAB
ATRIAL RATE: 93 BPM
P AXIS: 54 DEGREES
P OFFSET: 192 MS
P ONSET: 127 MS
PR INTERVAL: 176 MS
Q ONSET: 215 MS
QRS COUNT: 15 BEATS
QRS DURATION: 82 MS
QT INTERVAL: 324 MS
QTC CALCULATION(BAZETT): 402 MS
QTC FREDERICIA: 375 MS
R AXIS: -10 DEGREES
T AXIS: -9 DEGREES
T OFFSET: 377 MS
VENTRICULAR RATE: 93 BPM

## 2025-02-05 ENCOUNTER — OFFICE VISIT (OUTPATIENT)
Dept: NEUROLOGY | Facility: CLINIC | Age: 55
End: 2025-02-05
Payer: MEDICARE

## 2025-02-05 VITALS
WEIGHT: 204 LBS | TEMPERATURE: 97.6 F | HEART RATE: 60 BPM | RESPIRATION RATE: 18 BRPM | SYSTOLIC BLOOD PRESSURE: 124 MMHG | HEIGHT: 67 IN | BODY MASS INDEX: 32.02 KG/M2 | DIASTOLIC BLOOD PRESSURE: 80 MMHG

## 2025-02-05 DIAGNOSIS — I61.3: ICD-10-CM

## 2025-02-05 PROCEDURE — 3074F SYST BP LT 130 MM HG: CPT | Performed by: STUDENT IN AN ORGANIZED HEALTH CARE EDUCATION/TRAINING PROGRAM

## 2025-02-05 PROCEDURE — 3008F BODY MASS INDEX DOCD: CPT | Performed by: STUDENT IN AN ORGANIZED HEALTH CARE EDUCATION/TRAINING PROGRAM

## 2025-02-05 PROCEDURE — 99204 OFFICE O/P NEW MOD 45 MIN: CPT | Performed by: STUDENT IN AN ORGANIZED HEALTH CARE EDUCATION/TRAINING PROGRAM

## 2025-02-05 PROCEDURE — 3079F DIAST BP 80-89 MM HG: CPT | Performed by: STUDENT IN AN ORGANIZED HEALTH CARE EDUCATION/TRAINING PROGRAM

## 2025-02-05 PROCEDURE — 99214 OFFICE O/P EST MOD 30 MIN: CPT | Performed by: STUDENT IN AN ORGANIZED HEALTH CARE EDUCATION/TRAINING PROGRAM

## 2025-02-05 RX ORDER — METFORMIN HYDROCHLORIDE 1000 MG/1
1000 TABLET ORAL
COMMUNITY

## 2025-02-05 RX ORDER — DULAGLUTIDE 0.75 MG/.5ML
0.75 INJECTION, SOLUTION SUBCUTANEOUS WEEKLY
COMMUNITY

## 2025-02-05 ASSESSMENT — ENCOUNTER SYMPTOMS
OCCASIONAL FEELINGS OF UNSTEADINESS: 0
DEPRESSION: 0
LOSS OF SENSATION IN FEET: 0

## 2025-02-05 ASSESSMENT — PATIENT HEALTH QUESTIONNAIRE - PHQ9
SUM OF ALL RESPONSES TO PHQ9 QUESTIONS 1 AND 2: 0
2. FEELING DOWN, DEPRESSED OR HOPELESS: NOT AT ALL
1. LITTLE INTEREST OR PLEASURE IN DOING THINGS: NOT AT ALL

## 2025-02-05 ASSESSMENT — PAIN SCALES - GENERAL: PAINLEVEL_OUTOF10: 0-NO PAIN

## 2025-02-05 NOTE — PROGRESS NOTES
"   [facility]   Caryl Tucker is a 54 y.o. year old female presenting for neurologic evaluation.   Referred by: No ref. provider found  PCP: Elaina Wilson MD    02/05/25: referral for hx of stroke.     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 Wheelchair and bed bound in nursing facility.     Pt states she is still actively participating in therapy at the facility and finds it helpful. Has L knee joint pain which is ok when she is in her wheelchair but starts when she lays in bed. No significant stiffness /cramps / radicular pain. She is motivated to participate in therapy to get strength better.     Pt is on eliquis for afib but also aspirin. DM was poorly controlled prior (A1c 12%) but now improved control (6.5%). LDL none available but non-HDL cholesterol was 77.       Review of imaging, echocardiogram, labs and other data:   MRI: pontine bleed 8/19/2016   Vessel imaging: MRA head and neck negative for stenosis, aneurysms 8/2016  Echo:            Ejection Fraction: 60-65% EF           LA size: moderately dilated           Bubble study: n/a   LDL: non-HDL cholesterol 77   A1c: 12.1 --> 6.5   Afib on eliquis   BP today 124/80     Relevant ROS, Problem list, Past Medical/ Surgical/ Family/ Social history- reviewed and pertinent details noted in history.     Objective     Visit Vitals  /80 (BP Location: Right arm, Patient Position: Sitting, BP Cuff Size: Adult)   Pulse 60   Temp 36.4 °C (97.6 °F) (Temporal)   Resp 18   Ht 1.702 m (5' 7\")   Wt 92.5 kg (204 lb)   BMI 31.95 kg/m²   Smoking Status Never   BSA 2.09 m²       MENTAL STATUS:  General appearance: in NAD  Language: slurred speech but without obvious aphasia   Concentration: Intact  Fund of knowledge: fair     CRANIAL NERVES:  - Fundoscopic exam: Deferred   - II/III: PERRL  - II:  Visual fields intact to confrontation bilaterally   - III, IV, VI: EOMI to pursuit without " nystagmus  - V: V1-V3 sensation intact bilaterally  - VII: Mild R facial droop NL fold flattening   - VIII: Intact to finger rub  - IX, X: Palate elevated symmetrically bilaterally, no hoarseness  - XI: 5/5 strength on shoulder shrugging bilaterally  - XII: Tongue midline without atrophy or fasciculation    MOTOR: Tone and bulk normal in all extremities  STRENGTH: LUE and LLE hemiplegia with increased tone. Distally LUE 3/5, proximally 1/5. LLE strength 1/5 throughout essentially, proximally and distally.     REFLEXES: R L  Biceps   +1 +1  Brachioradialis +1 +1  Patellar   +1 +1  Achilles   +1 +1  COORDINATION: limited by weakness LUE / LLE   SENSORY: Intact to light touch in BUE and BLE  GAIT: deferred, wheelchair bound       CT head wo IV contrast    Result Date: 7/22/2024  The examination is mildly degraded by patient motion artifact   No acute intracranial hemorrhage or mass effect.   Mild degree of nonspecific white matter hypodensity compatible with microangiopathy.   MACRO: None   Signed by: Candis Burns 7/22/2024 7:03 AM Dictation workstation:   [id number]    CT head wo IV contrast    Result Date: 7/21/2024  Minimal cortical volume loss without hydrocephalus, hemorrhage or extra-axial collection.   MACRO: none   Signed by: Dick Darling 7/21/2024 2:09 PM Dictation workstation:   [id number]   No MRI head results found for the past 12 months  Encounter Date: 11/01/24   ECG 12 Lead   Result Value    Ventricular Rate 93    Atrial Rate 93    LA Interval 176    QRS Duration 82    QT Interval 324    QTC Calculation(Bazett) 402    P Axis 54    R Axis -10    T Axis -9    QRS Count 15    Q Onset 215    P Onset 127    P Offset 192    T Offset 377    QTC Fredericia 375     No echocardiogram results found for the past 12 months     Lab Results   Component Value Date    CHOL 112 11/01/2024    HDL 34.7 11/01/2024    CHHDL 3.2 11/01/2024     Lab Results   Component Value Date    BNP 6 07/21/2024    HGBA1C 6.5 (H)  "11/01/2024     Lab Results   Component Value Date    GLUCOSE 111 (H) 11/01/2024     11/01/2024    K 3.8 11/01/2024    CL 99 11/01/2024    CO2 34 (H) 11/01/2024    ANIONGAP 11 11/01/2024    BUN 7 11/01/2024    CREATININE 0.72 11/01/2024    CALCIUM 8.9 11/01/2024    PHOS 4.1 08/06/2024    ALBUMIN 3.5 11/01/2024     Lab Results   Component Value Date    CALCIUM 8.9 11/01/2024    PHOS 4.1 08/06/2024    PROT 7.5 11/01/2024    ALBUMIN 3.5 11/01/2024    AST 11 11/01/2024    ALT 7 11/01/2024    ALKPHOS 155 (H) 11/01/2024    BILITOT 0.6 11/01/2024     Lab Results   Component Value Date    WBC 7.8 11/01/2024    HGB 12.8 11/01/2024    HCT 41.5 11/01/2024    MCV 87 11/01/2024     11/01/2024     INR   Date Value Ref Range Status   07/29/2024 1.0 0.9 - 1.1 Final     Lab Results   Component Value Date    TSH 3.43 07/21/2024       Assessment/Plan   1. 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 BP well controlled today 124/80. On Eliquis for afib, as long as BP is controlled AC could be continued, however would ensure SBP does not get higher than 160 to avoid further ICH.   Vascular risk factors: HTN, HLD, DM - all at goal / nearly at goal, continue current management  L hemiparesis - tone is only mildly increased, no significant pain associated per patient (no burning, spasms). Encouraged participating with PT, daily stretching. If spasticity worsens can consider baclofen or botox injections, would need spasticity clinic or PM&R referral for this.      Follow up PRN       Goals for STROKE PREVENTION- recommendations to reduce the risk of vascular events and promote brain health include monitoring and management of vascular risk factors and lifestyle choices to goal values.     Cardiovascular disease- Goal is monitoring and management of conditions that increase stroke risk.   Antithrombotic \"blood thinner\" medication- Antithrombic " "Therapy/Blood Thinners : Recommended to prevent a stroke or other vascular event - anticoagulation for Afib   Blood pressure- Normal BP is <120/80 mmHg.  Blood Pressure : Goal is less than 130/80 mmHg  Cholesterol- Ideal lipid profile is an LDL-cholesterol <70 mg/dl, total cholesterol <200 mg/dl, fasting triglycerides < 150 mg/dl and HDL-cholesterol >55 mg/dl.   Blood sugar- Blood Sugar : Goal is fasting sugar  mg/dl, after eating less than 180 mg/dl; HbA1c less than 7%  Healthy physical activity- Goal is a moderate level of exercise at least 30 minutes/day, most days of the week.   Healthy weight- Goal is an ideal weight with a waistline <40\" for men or <35\" for women, and BMI of 18.5-25.   Healthy diet- is rich in vegetables, fruits, whole grains, legumes and fish, low in salt, and avoids red meats and processed/ refined foods.   Healthy sleep- is restorative, ~7 hours/night, with identification and treatment of obstructive/ central sleep apnea that increases the risk of stroke and heart disease.   Smoking- Goal is to stop smoking any tobacco product and avoid second-hand smoke. For help to quit all forms of tobacco, call 9-759-QUIT-NOW (1-494.233.3421) to speak with a specialist at the [facility].    Alcohol- Goal is moderation; no more than 2 servings for men and 1 serving for non-pregnant women.   Drug use- Goal is avoidance of illicit drugs that can cause blood pressure spikes and damage to blood vessels.   Stroke Warning Signs- know the symptoms of stroke and the importance of calling 911/EMS to access the quickest treatment.     No orders of the defined types were placed in this encounter.      I personally spent 45 minutes today, exclusive of procedures, providing care for this patient, including preparation, face to face time, documentation and other services such as review of medical records, diagnostic result, patient education, counseling, coordination of care as specified in the encounter. "